# Patient Record
Sex: FEMALE | Race: OTHER | HISPANIC OR LATINO | URBAN - METROPOLITAN AREA
[De-identification: names, ages, dates, MRNs, and addresses within clinical notes are randomized per-mention and may not be internally consistent; named-entity substitution may affect disease eponyms.]

---

## 2020-02-14 ENCOUNTER — EMERGENCY (EMERGENCY)
Facility: HOSPITAL | Age: 85
LOS: 1 days | Discharge: HOME | End: 2020-02-14
Attending: EMERGENCY MEDICINE
Payer: MEDICARE

## 2020-02-14 VITALS
DIASTOLIC BLOOD PRESSURE: 78 MMHG | OXYGEN SATURATION: 97 % | WEIGHT: 130.07 LBS | TEMPERATURE: 98 F | HEIGHT: 59 IN | SYSTOLIC BLOOD PRESSURE: 138 MMHG | RESPIRATION RATE: 19 BRPM | HEART RATE: 74 BPM

## 2020-02-14 LAB
ALBUMIN SERPL ELPH-MCNC: 3.5 G/DL — SIGNIFICANT CHANGE UP (ref 3.5–5)
ALP SERPL-CCNC: 62 U/L — SIGNIFICANT CHANGE UP (ref 40–120)
ALT FLD-CCNC: 21 U/L DA — SIGNIFICANT CHANGE UP (ref 10–60)
ANION GAP SERPL CALC-SCNC: 7 MMOL/L — SIGNIFICANT CHANGE UP (ref 5–17)
APTT BLD: 34 SEC — SIGNIFICANT CHANGE UP (ref 27.5–36.3)
AST SERPL-CCNC: 17 U/L — SIGNIFICANT CHANGE UP (ref 10–40)
BILIRUB SERPL-MCNC: 0.3 MG/DL — SIGNIFICANT CHANGE UP (ref 0.2–1.2)
BUN SERPL-MCNC: 24 MG/DL — HIGH (ref 7–18)
CALCIUM SERPL-MCNC: 9.3 MG/DL — SIGNIFICANT CHANGE UP (ref 8.4–10.5)
CHLORIDE SERPL-SCNC: 104 MMOL/L — SIGNIFICANT CHANGE UP (ref 96–108)
CO2 SERPL-SCNC: 27 MMOL/L — SIGNIFICANT CHANGE UP (ref 22–31)
CREAT SERPL-MCNC: 0.9 MG/DL — SIGNIFICANT CHANGE UP (ref 0.5–1.3)
FLU A RESULT: SIGNIFICANT CHANGE UP
FLU A RESULT: SIGNIFICANT CHANGE UP
FLUAV AG NPH QL: SIGNIFICANT CHANGE UP
FLUBV AG NPH QL: SIGNIFICANT CHANGE UP
GLUCOSE SERPL-MCNC: 101 MG/DL — HIGH (ref 70–99)
HCT VFR BLD CALC: 34.7 % — SIGNIFICANT CHANGE UP (ref 34.5–45)
HGB BLD-MCNC: 11.2 G/DL — LOW (ref 11.5–15.5)
INR BLD: 1.02 RATIO — SIGNIFICANT CHANGE UP (ref 0.88–1.16)
LACTATE SERPL-SCNC: 1.8 MMOL/L — SIGNIFICANT CHANGE UP (ref 0.7–2)
MCHC RBC-ENTMCNC: 27.5 PG — SIGNIFICANT CHANGE UP (ref 27–34)
MCHC RBC-ENTMCNC: 32.3 GM/DL — SIGNIFICANT CHANGE UP (ref 32–36)
MCV RBC AUTO: 85.3 FL — SIGNIFICANT CHANGE UP (ref 80–100)
NRBC # BLD: 0 /100 WBCS — SIGNIFICANT CHANGE UP (ref 0–0)
NT-PROBNP SERPL-SCNC: 160 PG/ML — SIGNIFICANT CHANGE UP (ref 0–450)
PLATELET # BLD AUTO: 136 K/UL — LOW (ref 150–400)
POTASSIUM SERPL-MCNC: 3.7 MMOL/L — SIGNIFICANT CHANGE UP (ref 3.5–5.3)
POTASSIUM SERPL-SCNC: 3.7 MMOL/L — SIGNIFICANT CHANGE UP (ref 3.5–5.3)
PROT SERPL-MCNC: 6.7 G/DL — SIGNIFICANT CHANGE UP (ref 6–8.3)
PROTHROM AB SERPL-ACNC: 11.3 SEC — SIGNIFICANT CHANGE UP (ref 10–12.9)
RBC # BLD: 4.07 M/UL — SIGNIFICANT CHANGE UP (ref 3.8–5.2)
RBC # FLD: 13.7 % — SIGNIFICANT CHANGE UP (ref 10.3–14.5)
RSV RESULT: SIGNIFICANT CHANGE UP
RSV RNA RESP QL NAA+PROBE: SIGNIFICANT CHANGE UP
SODIUM SERPL-SCNC: 138 MMOL/L — SIGNIFICANT CHANGE UP (ref 135–145)
TROPONIN I SERPL-MCNC: <0.015 NG/ML — SIGNIFICANT CHANGE UP (ref 0–0.04)
WBC # BLD: 4.83 K/UL — SIGNIFICANT CHANGE UP (ref 3.8–10.5)
WBC # FLD AUTO: 4.83 K/UL — SIGNIFICANT CHANGE UP (ref 3.8–10.5)

## 2020-02-14 PROCEDURE — 36415 COLL VENOUS BLD VENIPUNCTURE: CPT

## 2020-02-14 PROCEDURE — 93005 ELECTROCARDIOGRAM TRACING: CPT

## 2020-02-14 PROCEDURE — 86901 BLOOD TYPING SEROLOGIC RH(D): CPT

## 2020-02-14 PROCEDURE — 84484 ASSAY OF TROPONIN QUANT: CPT

## 2020-02-14 PROCEDURE — 80053 COMPREHEN METABOLIC PANEL: CPT

## 2020-02-14 PROCEDURE — 93010 ELECTROCARDIOGRAM REPORT: CPT

## 2020-02-14 PROCEDURE — 71045 X-RAY EXAM CHEST 1 VIEW: CPT | Mod: 26

## 2020-02-14 PROCEDURE — 83605 ASSAY OF LACTIC ACID: CPT

## 2020-02-14 PROCEDURE — 87631 RESP VIRUS 3-5 TARGETS: CPT

## 2020-02-14 PROCEDURE — 71045 X-RAY EXAM CHEST 1 VIEW: CPT

## 2020-02-14 PROCEDURE — 82962 GLUCOSE BLOOD TEST: CPT

## 2020-02-14 PROCEDURE — 85027 COMPLETE CBC AUTOMATED: CPT

## 2020-02-14 PROCEDURE — 85730 THROMBOPLASTIN TIME PARTIAL: CPT

## 2020-02-14 PROCEDURE — 85610 PROTHROMBIN TIME: CPT

## 2020-02-14 PROCEDURE — 83880 ASSAY OF NATRIURETIC PEPTIDE: CPT

## 2020-02-14 PROCEDURE — 86900 BLOOD TYPING SEROLOGIC ABO: CPT

## 2020-02-14 PROCEDURE — 99285 EMERGENCY DEPT VISIT HI MDM: CPT

## 2020-02-14 PROCEDURE — 86850 RBC ANTIBODY SCREEN: CPT

## 2020-02-14 PROCEDURE — 99284 EMERGENCY DEPT VISIT MOD MDM: CPT | Mod: 25

## 2020-02-14 RX ORDER — SODIUM CHLORIDE 9 MG/ML
1000 INJECTION INTRAMUSCULAR; INTRAVENOUS; SUBCUTANEOUS ONCE
Refills: 0 | Status: COMPLETED | OUTPATIENT
Start: 2020-02-14 | End: 2020-02-14

## 2020-02-14 RX ORDER — AZITHROMYCIN 500 MG/1
1 TABLET, FILM COATED ORAL
Qty: 6 | Refills: 0
Start: 2020-02-14 | End: 2020-02-18

## 2020-02-14 RX ORDER — CEFUROXIME AXETIL 250 MG
1 TABLET ORAL
Qty: 14 | Refills: 0
Start: 2020-02-14 | End: 2020-02-20

## 2020-02-14 RX ADMIN — SODIUM CHLORIDE 1000 MILLILITER(S): 9 INJECTION INTRAMUSCULAR; INTRAVENOUS; SUBCUTANEOUS at 05:40

## 2020-02-14 NOTE — ED PROVIDER NOTE - NSFOLLOWUPINSTRUCTIONS_ED_ALL_ED_FT
Community-Acquired Pneumonia, Adult  Pneumonia is an infection of the lungs. It causes swelling in the airways of the lungs. Mucus and fluid may also build up inside the airways.  One type of pneumonia can happen while a person is in a hospital. A different type can happen when a person is not in a hospital (community-acquired pneumonia).   What are the causes?     This condition is caused by germs (viruses, bacteria, or fungi). Some types of germs can be passed from one person to another. This can happen when you breathe in droplets from the cough or sneeze of an infected person.  What increases the risk?  You are more likely to develop this condition if you:  Have a long-term (chronic) disease, such as:  Chronic obstructive pulmonary disease (COPD).Asthma.Cystic fibrosis.Congestive heart failure.Diabetes.Kidney disease.Have HIV.Have sickle cell disease.Have had your spleen removed.Do not take good care of your teeth and mouth (poor dental hygiene).Have a medical condition that increases the risk of breathing in droplets from your own mouth and nose.Have a weakened body defense system (immune system).Are a smoker.Travel to areas where the germs that cause this illness are common.Are around certain animals or the places they live.What are the signs or symptoms?  A dry cough. A wet (productive) cough.Fever. Sweating. Chest pain. This often happens when breathing deeply or coughing.Fast breathing or trouble breathing.Shortness of breath. Shaking chills. Feeling tired (fatigue).Muscle aches.How is this treated?  Treatment for this condition depends on many things. Most adults can be treated at home. In some cases, treatment must happen in a hospital. Treatment may include:  Medicines given by mouth or through an IV tube.Being given extra oxygen.Respiratory therapy.In rare cases, treatment for very bad pneumonia may include:  Using a machine to help you breathe.Having a procedure to remove fluid from around your lungs.Follow these instructions at home:  Medicines     Take over-the-counter and prescription medicines only as told by your doctor.  Only take cough medicine if you are losing sleep.If you were prescribed an antibiotic medicine, take it as told by your doctor. Do not stop taking the antibiotic even if you start to feel better.General instructions        Sleep with your head and neck raised (elevated). You can do this by sleeping in a recliner or by putting a few pillows under your head.Rest as needed. Get at least 8 hours of sleep each night.Drink enough water to keep your pee (urine) pale yellow.Eat a healthy diet that includes plenty of vegetables, fruits, whole grains, low-fat dairy products, and lean protein.Do not use any products that contain nicotine or tobacco. These include cigarettes, e-cigarettes, and chewing tobacco. If you need help quitting, ask your doctor.Keep all follow-up visits as told by your doctor. This is important.How is this prevented?  A shot (vaccine) can help prevent pneumonia. Shots are often suggested for:  People older than 65 years of age.People older than 19 years of age who:  Are having cancer treatment.Have long-term (chronic) lung disease.Have problems with their body's defense system.You may also prevent pneumonia if you take these actions:  Get the flu (influenza) shot every year.Go to the dentist as often as told.Wash your hands often. If you cannot use soap and water, use hand .Contact a doctor if:  You have a fever.You lose sleep because your cough medicine does not help.Get help right away if:  You are short of breath and it gets worse.You have more chest pain.Your sickness gets worse. This is very serious if:  You are an older adult.Your body's defense system is weak.You cough up blood.Summary  Pneumonia is an infection of the lungs.Most adults can be treated at home. Some will need treatment in a hospital.Drink enough water to keep your pee pale yellow.Get at least 8 hours of sleep each night.This information is not intended to replace advice given to you by your health care provider. Make sure you discuss any questions you have with your health care provider.    Document Released: 06/05/2009 Document Revised: 08/15/2019 Document Reviewed: 08/15/2019  [employer] Interactive Patient Education © 2020 [employer]

## 2020-02-14 NOTE — ED PROVIDER NOTE - PATIENT PORTAL LINK FT
You can access the FollowMyHealth Patient Portal offered by [facility] by registering at the following website: [email or web address]. By joining [employer]’s FollowMyHealth portal, you will also be able to view your health information using other applications (apps) compatible with our system.

## 2020-02-14 NOTE — ED PROVIDER NOTE - NS ED ROS FT
CONSTITUTIONAL: +fever, no chills   EYES: no visual changes, no eye pain   ENMT: no nasal congestion, no throat pain  CARDIOVASCULAR: +chest pain, no edema, no palpitations   RESPIRATORY: no shortness of breath, +cough   GASTROINTESTINAL: no abdominal pain, no nausea, no vomiting, no diarrhea, no constipation   GENITOURINARY: no dysuria, no frequency  MUSCULOSKELETAL: no joint pains, +myalgias, no back pain   SKIN: no rashes  NEUROLOGICAL: no weakness, no headache, no dizziness, no slurred speech, no syncope   PSYCHIATRIC: no known mental health illness   HEME/LYMPH: no lymphadenopathy      All other ROS negative except as per HPI

## 2020-02-14 NOTE — ED PROVIDER NOTE - PHYSICAL EXAMINATION
GENERAL: well appearing, no acute distress, coughing during exam   HEAD: atraumatic   EYES: EOMI, pink conjunctiva   ENT: moist oral mucosa   CARDIAC: RRR, no edema, distal pulses present   RESPIRATORY: lungs CTAB, no increased work of breathing   GASTROINTESTINAL: no abdominal tenderness, no rebound or guarding, bowel sounds presents  GENITOURINARY: no CVA tenderness   MUSCULOSKELETAL: no deformity   NEUROLOGICAL: AAOx3, spontaneous movement of extremities   SKIN: intact   PSYCHIATRIC: cooperative  HEME LYMPH: no lymphadenopathy

## 2020-02-14 NOTE — ED PROVIDER NOTE - PROGRESS NOTE DETAILS
EKG - nsr, rate 74, 1st deg AV block  CXR - questionable opacity of L heart border   labs - trop negative, bnp negative, lactate 1.8  Will dc with rx for azithromycin and PCP fu. Discussed indications for patient return to ED. Patient understood.

## 2020-02-14 NOTE — ED PROVIDER NOTE - OBJECTIVE STATEMENT
86 yo F pmh of DM, HTN, HLD presents with cough x 4 days with fever and body aches and chest pain while coughing. Took tylenol pta. Denies HA, SOB, abd pain, n/v/d, other acute complaints. Declined translation services; opted for family to translate at bedside.

## 2020-02-14 NOTE — ED ADULT NURSE NOTE - OBJECTIVE STATEMENT
Pt was BIB EMS reporting cough, body aches, congestion, fever X 4days. Pt took Tylenol prior to arrival. HX HTN, DM, HLD. denied allergies to medication. Pt denied chest pain, SOB, dizziness, N/V/D. All safety precautions in place.

## 2021-07-06 ENCOUNTER — INPATIENT (INPATIENT)
Facility: HOSPITAL | Age: 86
LOS: 0 days | Discharge: HOME | DRG: 554 | End: 2021-07-07
Attending: INTERNAL MEDICINE | Admitting: INTERNAL MEDICINE
Payer: MEDICARE

## 2021-07-06 VITALS
SYSTOLIC BLOOD PRESSURE: 171 MMHG | HEART RATE: 64 BPM | TEMPERATURE: 98 F | RESPIRATION RATE: 18 BRPM | OXYGEN SATURATION: 98 % | HEIGHT: 59 IN | DIASTOLIC BLOOD PRESSURE: 72 MMHG | WEIGHT: 136.47 LBS

## 2021-07-06 DIAGNOSIS — M25.569 PAIN IN UNSPECIFIED KNEE: ICD-10-CM

## 2021-07-06 DIAGNOSIS — Z86.79 PERSONAL HISTORY OF OTHER DISEASES OF THE CIRCULATORY SYSTEM: ICD-10-CM

## 2021-07-06 DIAGNOSIS — I25.10 ATHEROSCLEROTIC HEART DISEASE OF NATIVE CORONARY ARTERY WITHOUT ANGINA PECTORIS: ICD-10-CM

## 2021-07-06 DIAGNOSIS — Z29.9 ENCOUNTER FOR PROPHYLACTIC MEASURES, UNSPECIFIED: ICD-10-CM

## 2021-07-06 DIAGNOSIS — R09.89 OTHER SPECIFIED SYMPTOMS AND SIGNS INVOLVING THE CIRCULATORY AND RESPIRATORY SYSTEMS: ICD-10-CM

## 2021-07-06 DIAGNOSIS — E11.9 TYPE 2 DIABETES MELLITUS WITHOUT COMPLICATIONS: ICD-10-CM

## 2021-07-06 DIAGNOSIS — E78.5 HYPERLIPIDEMIA, UNSPECIFIED: ICD-10-CM

## 2021-07-06 LAB
24R-OH-CALCIDIOL SERPL-MCNC: 25.1 NG/ML — LOW (ref 30–80)
A1C WITH ESTIMATED AVERAGE GLUCOSE RESULT: 7.3 % — HIGH (ref 4–5.6)
ALBUMIN SERPL ELPH-MCNC: 3.3 G/DL — LOW (ref 3.5–5)
ALBUMIN SERPL ELPH-MCNC: 3.5 G/DL — SIGNIFICANT CHANGE UP (ref 3.5–5)
ALP SERPL-CCNC: 83 U/L — SIGNIFICANT CHANGE UP (ref 40–120)
ALP SERPL-CCNC: 93 U/L — SIGNIFICANT CHANGE UP (ref 40–120)
ALT FLD-CCNC: 19 U/L DA — SIGNIFICANT CHANGE UP (ref 10–60)
ALT FLD-CCNC: 20 U/L DA — SIGNIFICANT CHANGE UP (ref 10–60)
ANION GAP SERPL CALC-SCNC: 3 MMOL/L — LOW (ref 5–17)
ANION GAP SERPL CALC-SCNC: 5 MMOL/L — SIGNIFICANT CHANGE UP (ref 5–17)
APTT BLD: 37.9 SEC — HIGH (ref 27.5–35.5)
AST SERPL-CCNC: 16 U/L — SIGNIFICANT CHANGE UP (ref 10–40)
AST SERPL-CCNC: 17 U/L — SIGNIFICANT CHANGE UP (ref 10–40)
BASOPHILS # BLD AUTO: 0.03 K/UL — SIGNIFICANT CHANGE UP (ref 0–0.2)
BASOPHILS NFR BLD AUTO: 0.5 % — SIGNIFICANT CHANGE UP (ref 0–2)
BILIRUB SERPL-MCNC: 0.2 MG/DL — SIGNIFICANT CHANGE UP (ref 0.2–1.2)
BILIRUB SERPL-MCNC: 0.2 MG/DL — SIGNIFICANT CHANGE UP (ref 0.2–1.2)
BUN SERPL-MCNC: 26 MG/DL — HIGH (ref 7–18)
BUN SERPL-MCNC: 28 MG/DL — HIGH (ref 7–18)
CALCIUM SERPL-MCNC: 9.5 MG/DL — SIGNIFICANT CHANGE UP (ref 8.4–10.5)
CALCIUM SERPL-MCNC: 9.8 MG/DL — SIGNIFICANT CHANGE UP (ref 8.4–10.5)
CHLORIDE SERPL-SCNC: 105 MMOL/L — SIGNIFICANT CHANGE UP (ref 96–108)
CHLORIDE SERPL-SCNC: 107 MMOL/L — SIGNIFICANT CHANGE UP (ref 96–108)
CO2 SERPL-SCNC: 28 MMOL/L — SIGNIFICANT CHANGE UP (ref 22–31)
CO2 SERPL-SCNC: 29 MMOL/L — SIGNIFICANT CHANGE UP (ref 22–31)
CREAT SERPL-MCNC: 0.81 MG/DL — SIGNIFICANT CHANGE UP (ref 0.5–1.3)
CREAT SERPL-MCNC: 0.94 MG/DL — SIGNIFICANT CHANGE UP (ref 0.5–1.3)
D DIMER BLD IA.RAPID-MCNC: <150 NG/ML DDU — SIGNIFICANT CHANGE UP
EOSINOPHIL # BLD AUTO: 0.21 K/UL — SIGNIFICANT CHANGE UP (ref 0–0.5)
EOSINOPHIL NFR BLD AUTO: 3.6 % — SIGNIFICANT CHANGE UP (ref 0–6)
ERYTHROCYTE [SEDIMENTATION RATE] IN BLOOD: 16 MM/HR — SIGNIFICANT CHANGE UP (ref 0–20)
ESTIMATED AVERAGE GLUCOSE: 163 MG/DL — HIGH (ref 68–114)
FERRITIN SERPL-MCNC: 16 NG/ML — SIGNIFICANT CHANGE UP (ref 15–150)
GLUCOSE BLDC GLUCOMTR-MCNC: 110 MG/DL — HIGH (ref 70–99)
GLUCOSE BLDC GLUCOMTR-MCNC: 193 MG/DL — HIGH (ref 70–99)
GLUCOSE BLDC GLUCOMTR-MCNC: 304 MG/DL — HIGH (ref 70–99)
GLUCOSE BLDC GLUCOMTR-MCNC: 99 MG/DL — SIGNIFICANT CHANGE UP (ref 70–99)
GLUCOSE SERPL-MCNC: 101 MG/DL — HIGH (ref 70–99)
GLUCOSE SERPL-MCNC: 144 MG/DL — HIGH (ref 70–99)
HCT VFR BLD CALC: 33.5 % — LOW (ref 34.5–45)
HCT VFR BLD CALC: 34.1 % — LOW (ref 34.5–45)
HGB BLD-MCNC: 10.9 G/DL — LOW (ref 11.5–15.5)
HGB BLD-MCNC: 10.9 G/DL — LOW (ref 11.5–15.5)
IMM GRANULOCYTES NFR BLD AUTO: 0.2 % — SIGNIFICANT CHANGE UP (ref 0–1.5)
INR BLD: 1.08 RATIO — SIGNIFICANT CHANGE UP (ref 0.88–1.16)
IRON SATN MFR SERPL: 28 UG/DL — LOW (ref 40–150)
IRON SATN MFR SERPL: 7 % — LOW (ref 15–50)
LYMPHOCYTES # BLD AUTO: 1.98 K/UL — SIGNIFICANT CHANGE UP (ref 1–3.3)
LYMPHOCYTES # BLD AUTO: 33.7 % — SIGNIFICANT CHANGE UP (ref 13–44)
MCHC RBC-ENTMCNC: 26.3 PG — LOW (ref 27–34)
MCHC RBC-ENTMCNC: 26.7 PG — LOW (ref 27–34)
MCHC RBC-ENTMCNC: 32 GM/DL — SIGNIFICANT CHANGE UP (ref 32–36)
MCHC RBC-ENTMCNC: 32.5 GM/DL — SIGNIFICANT CHANGE UP (ref 32–36)
MCV RBC AUTO: 81.9 FL — SIGNIFICANT CHANGE UP (ref 80–100)
MCV RBC AUTO: 82.4 FL — SIGNIFICANT CHANGE UP (ref 80–100)
MONOCYTES # BLD AUTO: 0.48 K/UL — SIGNIFICANT CHANGE UP (ref 0–0.9)
MONOCYTES NFR BLD AUTO: 8.2 % — SIGNIFICANT CHANGE UP (ref 2–14)
NEUTROPHILS # BLD AUTO: 3.16 K/UL — SIGNIFICANT CHANGE UP (ref 1.8–7.4)
NEUTROPHILS NFR BLD AUTO: 53.8 % — SIGNIFICANT CHANGE UP (ref 43–77)
NRBC # BLD: 0 /100 WBCS — SIGNIFICANT CHANGE UP (ref 0–0)
NRBC # BLD: 0 /100 WBCS — SIGNIFICANT CHANGE UP (ref 0–0)
PLATELET # BLD AUTO: 137 K/UL — LOW (ref 150–400)
PLATELET # BLD AUTO: 144 K/UL — LOW (ref 150–400)
POTASSIUM SERPL-MCNC: 3.7 MMOL/L — SIGNIFICANT CHANGE UP (ref 3.5–5.3)
POTASSIUM SERPL-MCNC: 3.8 MMOL/L — SIGNIFICANT CHANGE UP (ref 3.5–5.3)
POTASSIUM SERPL-SCNC: 3.7 MMOL/L — SIGNIFICANT CHANGE UP (ref 3.5–5.3)
POTASSIUM SERPL-SCNC: 3.8 MMOL/L — SIGNIFICANT CHANGE UP (ref 3.5–5.3)
PROT SERPL-MCNC: 7.1 G/DL — SIGNIFICANT CHANGE UP (ref 6–8.3)
PROT SERPL-MCNC: 7.6 G/DL — SIGNIFICANT CHANGE UP (ref 6–8.3)
PROTHROM AB SERPL-ACNC: 12.8 SEC — SIGNIFICANT CHANGE UP (ref 10.6–13.6)
RBC # BLD: 4.09 M/UL — SIGNIFICANT CHANGE UP (ref 3.8–5.2)
RBC # BLD: 4.14 M/UL — SIGNIFICANT CHANGE UP (ref 3.8–5.2)
RBC # FLD: 14.6 % — HIGH (ref 10.3–14.5)
RBC # FLD: 14.6 % — HIGH (ref 10.3–14.5)
SARS-COV-2 RNA SPEC QL NAA+PROBE: SIGNIFICANT CHANGE UP
SODIUM SERPL-SCNC: 137 MMOL/L — SIGNIFICANT CHANGE UP (ref 135–145)
SODIUM SERPL-SCNC: 140 MMOL/L — SIGNIFICANT CHANGE UP (ref 135–145)
TIBC SERPL-MCNC: 382 UG/DL — SIGNIFICANT CHANGE UP (ref 250–450)
UIBC SERPL-MCNC: 354 UG/DL — SIGNIFICANT CHANGE UP (ref 110–370)
WBC # BLD: 5.67 K/UL — SIGNIFICANT CHANGE UP (ref 3.8–10.5)
WBC # BLD: 5.87 K/UL — SIGNIFICANT CHANGE UP (ref 3.8–10.5)
WBC # FLD AUTO: 5.67 K/UL — SIGNIFICANT CHANGE UP (ref 3.8–10.5)
WBC # FLD AUTO: 5.87 K/UL — SIGNIFICANT CHANGE UP (ref 3.8–10.5)

## 2021-07-06 PROCEDURE — 71045 X-RAY EXAM CHEST 1 VIEW: CPT | Mod: 26

## 2021-07-06 PROCEDURE — 99285 EMERGENCY DEPT VISIT HI MDM: CPT

## 2021-07-06 PROCEDURE — 93970 EXTREMITY STUDY: CPT | Mod: 26

## 2021-07-06 PROCEDURE — 99221 1ST HOSP IP/OBS SF/LOW 40: CPT

## 2021-07-06 PROCEDURE — 73562 X-RAY EXAM OF KNEE 3: CPT | Mod: 26,RT

## 2021-07-06 RX ORDER — ACETAMINOPHEN 500 MG
0 TABLET ORAL
Qty: 0 | Refills: 0 | DISCHARGE

## 2021-07-06 RX ORDER — METOPROLOL TARTRATE 50 MG
25 TABLET ORAL
Refills: 0 | Status: DISCONTINUED | OUTPATIENT
Start: 2021-07-06 | End: 2021-07-07

## 2021-07-06 RX ORDER — TRAMADOL HYDROCHLORIDE 50 MG/1
25 TABLET ORAL EVERY 8 HOURS
Refills: 0 | Status: DISCONTINUED | OUTPATIENT
Start: 2021-07-06 | End: 2021-07-07

## 2021-07-06 RX ORDER — OXYCODONE HYDROCHLORIDE 5 MG/1
2.5 TABLET ORAL EVERY 6 HOURS
Refills: 0 | Status: DISCONTINUED | OUTPATIENT
Start: 2021-07-06 | End: 2021-07-07

## 2021-07-06 RX ORDER — RIVAROXABAN 15 MG-20MG
2.5 KIT ORAL
Refills: 0 | Status: DISCONTINUED | OUTPATIENT
Start: 2021-07-06 | End: 2021-07-07

## 2021-07-06 RX ORDER — EZETIMIBE 10 MG/1
1 TABLET ORAL
Qty: 0 | Refills: 0 | DISCHARGE

## 2021-07-06 RX ORDER — ASPIRIN/CALCIUM CARB/MAGNESIUM 324 MG
81 TABLET ORAL DAILY
Refills: 0 | Status: DISCONTINUED | OUTPATIENT
Start: 2021-07-06 | End: 2021-07-07

## 2021-07-06 RX ORDER — AMLODIPINE BESYLATE 2.5 MG/1
0 TABLET ORAL
Qty: 0 | Refills: 0 | DISCHARGE

## 2021-07-06 RX ORDER — METOPROLOL TARTRATE 50 MG
1 TABLET ORAL
Qty: 0 | Refills: 0 | DISCHARGE

## 2021-07-06 RX ORDER — LIDOCAINE 4 G/100G
1 CREAM TOPICAL DAILY
Refills: 0 | Status: DISCONTINUED | OUTPATIENT
Start: 2021-07-06 | End: 2021-07-07

## 2021-07-06 RX ORDER — AMLODIPINE BESYLATE 2.5 MG/1
10 TABLET ORAL DAILY
Refills: 0 | Status: DISCONTINUED | OUTPATIENT
Start: 2021-07-06 | End: 2021-07-07

## 2021-07-06 RX ORDER — PANTOPRAZOLE SODIUM 20 MG/1
40 TABLET, DELAYED RELEASE ORAL
Refills: 0 | Status: DISCONTINUED | OUTPATIENT
Start: 2021-07-06 | End: 2021-07-07

## 2021-07-06 RX ORDER — ACETAMINOPHEN 500 MG
650 TABLET ORAL ONCE
Refills: 0 | Status: COMPLETED | OUTPATIENT
Start: 2021-07-06 | End: 2021-07-06

## 2021-07-06 RX ORDER — ATORVASTATIN CALCIUM 80 MG/1
0 TABLET, FILM COATED ORAL
Qty: 0 | Refills: 0 | DISCHARGE

## 2021-07-06 RX ORDER — FERROUS SULFATE 325(65) MG
325 TABLET ORAL ONCE
Refills: 0 | Status: COMPLETED | OUTPATIENT
Start: 2021-07-06 | End: 2021-07-06

## 2021-07-06 RX ORDER — ACETAMINOPHEN 500 MG
650 TABLET ORAL EVERY 6 HOURS
Refills: 0 | Status: DISCONTINUED | OUTPATIENT
Start: 2021-07-06 | End: 2021-07-06

## 2021-07-06 RX ORDER — INSULIN LISPRO 100/ML
VIAL (ML) SUBCUTANEOUS AT BEDTIME
Refills: 0 | Status: DISCONTINUED | OUTPATIENT
Start: 2021-07-06 | End: 2021-07-07

## 2021-07-06 RX ORDER — SODIUM CHLORIDE 9 MG/ML
3 INJECTION INTRAMUSCULAR; INTRAVENOUS; SUBCUTANEOUS ONCE
Refills: 0 | Status: COMPLETED | OUTPATIENT
Start: 2021-07-06 | End: 2021-07-06

## 2021-07-06 RX ORDER — ACETAMINOPHEN 500 MG
975 TABLET ORAL EVERY 8 HOURS
Refills: 0 | Status: DISCONTINUED | OUTPATIENT
Start: 2021-07-06 | End: 2021-07-07

## 2021-07-06 RX ORDER — INSULIN LISPRO 100/ML
VIAL (ML) SUBCUTANEOUS
Refills: 0 | Status: DISCONTINUED | OUTPATIENT
Start: 2021-07-06 | End: 2021-07-07

## 2021-07-06 RX ORDER — ATORVASTATIN CALCIUM 80 MG/1
40 TABLET, FILM COATED ORAL AT BEDTIME
Refills: 0 | Status: DISCONTINUED | OUTPATIENT
Start: 2021-07-06 | End: 2021-07-07

## 2021-07-06 RX ORDER — ESOMEPRAZOLE MAGNESIUM 40 MG/1
1 CAPSULE, DELAYED RELEASE ORAL
Qty: 0 | Refills: 0 | DISCHARGE

## 2021-07-06 RX ORDER — FERROUS SULFATE 325(65) MG
325 TABLET ORAL
Qty: 0 | Refills: 0 | DISCHARGE

## 2021-07-06 RX ADMIN — Medication 81 MILLIGRAM(S): at 12:11

## 2021-07-06 RX ADMIN — LIDOCAINE 1 PATCH: 4 CREAM TOPICAL at 17:47

## 2021-07-06 RX ADMIN — AMLODIPINE BESYLATE 10 MILLIGRAM(S): 2.5 TABLET ORAL at 05:50

## 2021-07-06 RX ADMIN — Medication 4: at 12:10

## 2021-07-06 RX ADMIN — Medication 650 MILLIGRAM(S): at 02:46

## 2021-07-06 RX ADMIN — RIVAROXABAN 2.5 MILLIGRAM(S): KIT at 17:41

## 2021-07-06 RX ADMIN — Medication 325 MILLIGRAM(S): at 05:50

## 2021-07-06 RX ADMIN — LIDOCAINE 1 PATCH: 4 CREAM TOPICAL at 21:04

## 2021-07-06 RX ADMIN — RIVAROXABAN 2.5 MILLIGRAM(S): KIT at 06:02

## 2021-07-06 RX ADMIN — Medication 650 MILLIGRAM(S): at 03:16

## 2021-07-06 RX ADMIN — PANTOPRAZOLE SODIUM 40 MILLIGRAM(S): 20 TABLET, DELAYED RELEASE ORAL at 08:37

## 2021-07-06 RX ADMIN — Medication 25 MILLIGRAM(S): at 05:50

## 2021-07-06 RX ADMIN — Medication 650 MILLIGRAM(S): at 12:41

## 2021-07-06 RX ADMIN — SODIUM CHLORIDE 3 MILLILITER(S): 9 INJECTION INTRAMUSCULAR; INTRAVENOUS; SUBCUTANEOUS at 03:07

## 2021-07-06 RX ADMIN — Medication 975 MILLIGRAM(S): at 18:16

## 2021-07-06 RX ADMIN — Medication 975 MILLIGRAM(S): at 17:46

## 2021-07-06 RX ADMIN — Medication 650 MILLIGRAM(S): at 12:11

## 2021-07-06 RX ADMIN — Medication 25 MILLIGRAM(S): at 17:41

## 2021-07-06 NOTE — H&P ADULT - MUSCULOSKELETAL COMMENTS
Right Knee has decreased ROM, with joint swelling with joint warmth. No calf tenderness noted. 5/5 strenght b/l LE

## 2021-07-06 NOTE — H&P ADULT - NSICDXPASTMEDICALHX_GEN_ALL_CORE_FT
PAST MEDICAL HISTORY:  CAD (coronary artery disease)     Diabetes     History of hypertension     Hyperlipidemia

## 2021-07-06 NOTE — H&P ADULT - PROBLEM SELECTOR PLAN 6
IMPROVE VTE score: 3  Will manage with:     [ ] Previous VTE                                    3  [ ] Thrombophilia                                  2  [ x] Lower limb paralysis                        2  (unable to hold up >15 seconds)    [ ] Current Cancer (within 6 months)        2   [x] Immobilization > 24 hrs                    1  [ ] ICU/CCU stay > 24 hrs                      1  [x] Age > 60                                         1 IMPROVE VTE score:   Will manage with:     [ ] Previous VTE                                    3  [ ] Thrombophilia                                  2  [ x] Lower limb paralysis                        2  (unable to hold up >15 seconds)    [ ] Current Cancer (within 6 months)        2   [x] Immobilization > 24 hrs                    1  [ ] ICU/CCU stay > 24 hrs                      1  [x] Age > 60                                         1

## 2021-07-06 NOTE — H&P ADULT - PROBLEM SELECTOR PLAN 1
R/O DVT   - D- Dimer ordered  - US Duplex Venous Lower Extremity R/O DVT, reported DVT by ER attending but suspected PAD given patien is on xarelto 2.5mg BID   - D- Dimer ordered  - US Duplex Venous Lower Extremity  - Continue Xarelto 2.5mg BID, unclear if patient is on Aspirin  - Hold anticoagulation

## 2021-07-06 NOTE — CONSULT NOTE ADULT - SUBJECTIVE AND OBJECTIVE BOX
Pt Name: CAMILA ALLEN  MRN: [id number]    ORTHOPAEDIC SURGERY CONSULT    Diagnosis:   - Rt knee osteoarthritis mild-mod    86yFemaleHPI:  Patient is a Nigerien speaking 89yo female with hx of prior DVT, HTN, HLD, DM, CAD presents with a chief compliant of right knee pain. Patient is accompanied by her granddaughter who provides translation. Right knee pain started on 7/5/21 at 4AM. Pain is a stabbing pain, constant, non radiating with no alleviating or aggravating factors. Pain is described as a 10/10 in severity. Patient says before pain she felt a tingling sensation in her right toes and on the sole of her right foot. Patient denies any prior episodes or trauma. Patient denies any recent travel, period of immobility or recent surgery. Patient denies any chest pain, sob, cough. Denies any fevers, chills. Denies any numbness, or loss of sensation. Patient denies any swelling in the lower extremities.    (06 Jul 2021 03:37)    Pt is a 85yo f, with no recent trauma, who presents with a 2 day h/o mod-severe right knee pain, where she has had difficulty ambulating. She reports having this right knee before, where she has been treating herself with OTC medications for her right knee pain and lately her right knee pain has not responded to anything and she reports right knee swelling with inability to fully bend the right knee. Pt denies Chest pain, SOB, dyspnea, paresthesias, N/V/D, abdominal pain, syncope, or pain anywhere else.     AMBULATION: Baseline Ambulation  [     ] independent   [  XXX   ] With Cane   [     ] With Walker   [     ]  Bedbound   [     ] Pivot transfers to Wheelchair only      PAST MEDICAL & SURGICAL HISTORY:  Diabetes    History of hypertension    Hyperlipidemia    CAD (coronary artery disease)        ALLERGIES: No Known Allergies      MEDICATIONS: acetaminophen   Tablet .. 975 milliGRAM(s) Oral every 8 hours  amLODIPine   Tablet 10 milliGRAM(s) Oral daily  aspirin enteric coated 81 milliGRAM(s) Oral daily  atorvastatin 40 milliGRAM(s) Oral at bedtime  insulin lispro (ADMELOG) corrective regimen sliding scale   SubCutaneous three times a day before meals  insulin lispro (ADMELOG) corrective regimen sliding scale   SubCutaneous at bedtime  metoprolol tartrate 25 milliGRAM(s) Oral two times a day  oxyCODONE    IR 2.5 milliGRAM(s) Oral every 6 hours PRN  pantoprazole    Tablet 40 milliGRAM(s) Oral before breakfast  rivaroxaban 2.5 milliGRAM(s) Oral two times a day  traMADol 25 milliGRAM(s) Oral every 8 hours PRN      PHYSICAL EXAM:    Vital Signs Last 24 Hrs  T(C): 36.5 (06 Jul 2021 16:25), Max: 36.8 (06 Jul 2021 11:20)  T(F): 97.7 (06 Jul 2021 16:25), Max: 98.2 (06 Jul 2021 11:20)  HR: 64 (06 Jul 2021 16:25) (60 - 66)  BP: 119/68 (06 Jul 2021 16:25) (115/64 - 171/72)  BP(mean): --  RR: 17 (06 Jul 2021 16:25) (16 - 18)  SpO2: 96% (06 Jul 2021 16:25) (95% - 98%)    Gen: well developed, well nourished, comfortable  MSK:  Right knee  Skin pink, warm. No open lesions.  no erythema  +joint line tenderness noted (med+lat)  dec ROM 2* to pain  flexion rt knee noted 0-90deg AROM  no ct  calves soft  DP/PT 2+, brisk b/l  nvi silt  5/5 strength ehl/ta/gastroc b/l.    LABS:                        10.9   5.67  )-----------( 137      ( 06 Jul 2021 05:45 )             34.1     07-06    140  |  107  |  26<H>  ----------------------------<  101<H>  3.7   |  28  |  0.81    Ca    9.5      06 Jul 2021 05:45    TPro  7.1  /  Alb  3.3<L>  /  TBili  0.2  /  DBili  x   /  AST  16  /  ALT  19  /  AlkPhos  83  07-06    PT/INR - ( 06 Jul 2021 01:38 )   PT: 12.8 sec;   INR: 1.08 ratio         PTT - ( 06 Jul 2021 01:38 )  PTT:37.9 sec    RADIOLOGY:   < from: Xray Knee 3 Views, Right (07.06.21 @ 01:42) >    EXAM:  XR KNEE AP LAT OBL 3 VIEWS RT                            PROCEDURE DATE:  07/06/2021          INTERPRETATION:  Right knee. Patient has local swelling. 3 views. True lateral view not obtained so effusion assessment difficult. Arterial calcifications noted.    There is soft tissue swelling medial to the knee.    There is mild to moderate degeneration mainly centrally. No bone destruction or fracture.    IMPRESSION: Soft tissue swelling medially. Mild to moderate degeneration. No fracture.    --- End of Report ---            DAHIANA PARKER MD; Attending Radiologist  This document has been electronically signed. Jul 6 2021 11:05AM    < end of copied text >        A/P:   86y Female with:   - Mild to moderate degenerative joint disease of the right knee/osteoarthritis  #  -  Recommendation: [ XXX ] Conservative treatment   [  ] Surgical treatment/fixation     > ATC Tylenol 975mg po q8hr ordered x 48 hours     > oxycodone 2.5mg po q6hr prn pain     > tramadol 25mg po q8hr prn severe pain  -  No indication for needle aspiration at this time. No suspicion for infection. No suspicion for septic joint. no white count. ESR lvl WNL.       No surgical intervention at this time. Projecting a good prognosis with PT and pain control for rt knee pain  -  Pain control  -  DVT prophylaxis  -  Daily PT- WBAT of the right knee  -  Case d/w Dr. Knowles  -  Pt is orthopedically stable for discharge.   -  Follow-Up with Dr. Knowles in TWO WEEKS at [phone]  Pt Name: CAMILA ALLEN  MRN: [id number]    ORTHOPAEDIC SURGERY CONSULT    Diagnosis:   - Rt knee osteoarthritis mild-mod    86yFemaleHPI:  Patient is a Ghanaian speaking 88yo female with hx of prior DVT, HTN, HLD, DM, CAD presents with a chief compliant of right knee pain. Patient is accompanied by her granddaughter who provides translation. Right knee pain started on 7/5/21 at 4AM. Pain is a stabbing pain, constant, non radiating with no alleviating or aggravating factors. Pain is described as a 10/10 in severity. Patient says before pain she felt a tingling sensation in her right toes and on the sole of her right foot. Patient denies any prior episodes or trauma. Patient denies any recent travel, period of immobility or recent surgery. Patient denies any chest pain, sob, cough. Denies any fevers, chills. Denies any numbness, or loss of sensation. Patient denies any swelling in the lower extremities.    (06 Jul 2021 03:37)    Pt is a 87yo f, with no recent trauma, who presents with a 2 day h/o mod-severe right knee pain, where she has had difficulty ambulating. She reports having this right knee before, where she has been treating herself with OTC medications for her right knee pain and lately her right knee pain has not responded to anything and she reports right knee swelling with inability to fully bend the right knee. Pt denies Chest pain, SOB, dyspnea, paresthesias, N/V/D, abdominal pain, syncope, or pain anywhere else.     AMBULATION: Baseline Ambulation  [     ] independent   [  XXX   ] With Cane   [     ] With Walker   [     ]  Bedbound   [     ] Pivot transfers to Wheelchair only      PAST MEDICAL & SURGICAL HISTORY:  Diabetes    History of hypertension    Hyperlipidemia    CAD (coronary artery disease)        ALLERGIES: No Known Allergies      MEDICATIONS: acetaminophen   Tablet .. 975 milliGRAM(s) Oral every 8 hours  amLODIPine   Tablet 10 milliGRAM(s) Oral daily  aspirin enteric coated 81 milliGRAM(s) Oral daily  atorvastatin 40 milliGRAM(s) Oral at bedtime  insulin lispro (ADMELOG) corrective regimen sliding scale   SubCutaneous three times a day before meals  insulin lispro (ADMELOG) corrective regimen sliding scale   SubCutaneous at bedtime  metoprolol tartrate 25 milliGRAM(s) Oral two times a day  oxyCODONE    IR 2.5 milliGRAM(s) Oral every 6 hours PRN  pantoprazole    Tablet 40 milliGRAM(s) Oral before breakfast  rivaroxaban 2.5 milliGRAM(s) Oral two times a day  traMADol 25 milliGRAM(s) Oral every 8 hours PRN      PHYSICAL EXAM:    Vital Signs Last 24 Hrs  T(C): 36.5 (06 Jul 2021 16:25), Max: 36.8 (06 Jul 2021 11:20)  T(F): 97.7 (06 Jul 2021 16:25), Max: 98.2 (06 Jul 2021 11:20)  HR: 64 (06 Jul 2021 16:25) (60 - 66)  BP: 119/68 (06 Jul 2021 16:25) (115/64 - 171/72)  BP(mean): --  RR: 17 (06 Jul 2021 16:25) (16 - 18)  SpO2: 96% (06 Jul 2021 16:25) (95% - 98%)    Gen: well developed, well nourished, comfortable  MSK:  Right knee  Skin pink, warm. No open lesions.  no erythema  +joint line tenderness noted (med+lat)  dec ROM 2* to pain  flexion rt knee noted 0-90deg AROM  no ct  calves soft  DP/PT 2+, brisk b/l  nvi silt  5/5 strength ehl/ta/gastroc b/l.    LABS:                        10.9   5.67  )-----------( 137      ( 06 Jul 2021 05:45 )             34.1     07-06    140  |  107  |  26<H>  ----------------------------<  101<H>  3.7   |  28  |  0.81    Ca    9.5      06 Jul 2021 05:45    TPro  7.1  /  Alb  3.3<L>  /  TBili  0.2  /  DBili  x   /  AST  16  /  ALT  19  /  AlkPhos  83  07-06    PT/INR - ( 06 Jul 2021 01:38 )   PT: 12.8 sec;   INR: 1.08 ratio         PTT - ( 06 Jul 2021 01:38 )  PTT:37.9 sec    RADIOLOGY:   < from: Xray Knee 3 Views, Right (07.06.21 @ 01:42) >    EXAM:  XR KNEE AP LAT OBL 3 VIEWS RT                            PROCEDURE DATE:  07/06/2021          INTERPRETATION:  Right knee. Patient has local swelling. 3 views. True lateral view not obtained so effusion assessment difficult. Arterial calcifications noted.    There is soft tissue swelling medial to the knee.    There is mild to moderate degeneration mainly centrally. No bone destruction or fracture.    IMPRESSION: Soft tissue swelling medially. Mild to moderate degeneration. No fracture.    --- End of Report ---            DAHIANA PARKER MD; Attending Radiologist  This document has been electronically signed. Jul 6 2021 11:05AM    < end of copied text >        A/P:   86y Female with:   - Mild to moderate degenerative joint disease of the right knee/osteoarthritis  #  -  Recommendation: [ XXX ] Conservative treatment   [  ] Surgical treatment/fixation     > ATC Tylenol 975mg po q8hr ordered x 48 hours     > oxycodone 2.5mg po q6hr prn pain     > tramadol 25mg po q8hr prn severe pain     > Lidoderm patch rt knee  -  No indication for needle aspiration at this time. No suspicion for infection. No suspicion for septic joint. no white count. ESR lvl WNL.       No surgical intervention at this time. Projecting a good prognosis with PT and pain control for rt knee pain  -  Pain control  -  DVT prophylaxis  -  Daily PT- WBAT of the right knee  -  Case d/w Dr. Knowles  -  Pt is orthopedically stable for discharge.   -  Follow-Up with Dr. Knowles in TWO WEEKS at [phone]

## 2021-07-06 NOTE — H&P ADULT - PROBLEM SELECTOR PLAN 4
- BP Currently 171/72 currently asymptomatic  - Monitor BP   - Continue with Amlodipine 10mg PO QD  - DASH Diet

## 2021-07-06 NOTE — H&P ADULT - PROBLEM SELECTOR PLAN 5
- Lipid Panel ordered  - Continue Atorvastatin 40mg PO QHS  - Ezetimibe 10mg QD PO - Lipid Panel ordered  - Continue Atorvastatin 40mg PO QHS

## 2021-07-06 NOTE — H&P ADULT - PROBLEM SELECTOR PLAN 3
- Glucose noted to be elevated at 144  - HbA1C ordered  - POCT Glucose testing q 4 hours  - Continue with glipizide 5mg PO QD - Glucose noted to be elevated at 144  - HbA1C ordered  - POCT Glucose testing q 4 hours  - Sliding Scale ordered - Glucose noted to be elevated at 144  - Hold home medication Glypizide   - HbA1C ordered  - POCT Glucose testing q 4 hours  - Sliding Scale ordered, titrate as needed

## 2021-07-06 NOTE — H&P ADULT - NSICDXFAMILYHX_GEN_ALL_CORE_FT
FAMILY HISTORY:  FH: hyperlipidemia  FH: hypertension  FH: type 1 diabetes    Child  Still living? Unknown  FH: lung cancer, Age at diagnosis: Age Unknown

## 2021-07-06 NOTE — H&P ADULT - ASSESSMENT
Patient is a Sinhala speaking 88yo female with hx of prior DVT, HTN, HLD, DM, CAD presents with a chief compliant of right knee pain. Right knee pain started on 7/5/21 at 4AM. Pain is a stabbing pain, constant, non radiating with no alleviating or aggravating factors. Pain is described as a 10/10 in severity. Patient denies any recent travel, immobilization or surgery and trauma. Denies cp, sob, cough or swelling in the lower extremities. Physical exam significant for decreased ROM of R. knee, edematous, warm to touch and non erythematous. No calf tenderness noted. Labs WNL, X-ray negative for fracture. Patient is admitted to r/o DVT.  Patient is a Mauritian speaking 88yo female with hx of prior DVT, HTN, HLD, DM, CAD presents with a chief compliant of right knee pain. Right knee pain started on 7/5/21 at 4AM. Pain is a stabbing pain, constant, non radiating with no alleviating or aggravating factors. Pain is described as a 10/10 in severity. Patient denies any recent travel, immobilization or surgery and trauma. Denies cp, sob, cough or swelling in the lower extremities. Physical exam significant for decreased ROM of R. knee, edematous, warm to touch and non erythematous. No calf tenderness noted. Labs WNL, X-ray negative for fracture. Patient is admitted to r/o DVT.       < from: US Duplex Venous Lower Ext Complete, Bilateral (07.06.21 @ 10:04) >  IMPRESSION:  No evidence of deep venous thrombosis in either lower extremity.    --- End of Report ---    < end of copied text >      < from: Xray Knee 3 Views, Right (07.06.21 @ 01:42) >  IMPRESSION: Soft tissue swelling medially. Mild to moderate degeneration. No fracture.    --- End of Report ---    < end of copied text >    < from: Xray Chest 1 View AP/PA (07.06.21 @ 01:41) >    IMPRESSION: Interstitial prominence.    < end of copied text >        ASSESSMENT     Knee Pain - Right   DVT   CAD   DM   HTN   Abnormal CXR     PLAN     US negative for DVT   On A/C - Xarelto   XR showed soft tissue swelling / mild/mod degeneration   Continue home meds   Lipid panel   Pulm consult   Accuchecks   Regular insulin coverage per HSS   Check A1C level   Monitor BP   DVT and GI PPX      Patient is a Namibian speaking 88yo female with hx of prior DVT, HTN, HLD, DM, CAD presents with a chief compliant of right knee pain. Right knee pain started on 7/5/21 at 4AM. Pain is a stabbing pain, constant, non radiating with no alleviating or aggravating factors. Pain is described as a 10/10 in severity. Patient denies any recent travel, immobilization or surgery and trauma. Denies cp, sob, cough or swelling in the lower extremities. Physical exam significant for decreased ROM of R. knee, edematous, warm to touch and non erythematous. No calf tenderness noted. Labs WNL, X-ray negative for fracture. Patient is admitted to r/o DVT.       < from: US Duplex Venous Lower Ext Complete, Bilateral (07.06.21 @ 10:04) >  IMPRESSION:  No evidence of deep venous thrombosis in either lower extremity.    --- End of Report ---    < end of copied text >      < from: Xray Knee 3 Views, Right (07.06.21 @ 01:42) >  IMPRESSION: Soft tissue swelling medially. Mild to moderate degeneration. No fracture.    --- End of Report ---    < end of copied text >    < from: Xray Chest 1 View AP/PA (07.06.21 @ 01:41) >    IMPRESSION: Interstitial prominence.    < end of copied text >        ASSESSMENT     Knee Pain - Right   DVT   CAD   DM   HTN   Abnormal CXR     PLAN     US negative for DVT   On A/C - Xarelto   XR showed soft tissue swelling / mild/mod degeneration   Ortho eval - may need CT/MRI   Continue home meds   Lipid panel   Pulm consult   Accuchecks   Regular insulin coverage per HSS   Check A1C level   Monitor BP   DVT and GI PPX      Patient is a St Helenian speaking 87yo female with hx of prior DVT, HTN, HLD, DM, CAD presents with a chief compliant of right knee pain. Right knee pain started on 7/5/21 at 4AM. Pain is a stabbing pain, constant, non radiating with no alleviating or aggravating factors. Pain is described as a 10/10 in severity. Patient denies any recent travel, immobilization or surgery and trauma. Denies cp, sob, cough or swelling in the lower extremities. Physical exam significant for decreased ROM of R. knee, edematous, warm to touch and non erythematous. No calf tenderness noted. Labs WNL, X-ray negative for fracture. Patient is admitted to r/o DVT.       < from: US Duplex Venous Lower Ext Complete, Bilateral (07.06.21 @ 10:04) >  IMPRESSION:  No evidence of deep venous thrombosis in either lower extremity.    --- End of Report ---    < end of copied text >      < from: Xray Knee 3 Views, Right (07.06.21 @ 01:42) >  IMPRESSION: Soft tissue swelling medially. Mild to moderate degeneration. No fracture.    --- End of Report ---    < end of copied text >    < from: Xray Chest 1 View AP/PA (07.06.21 @ 01:41) >    IMPRESSION: Interstitial prominence.    < end of copied text >        ASSESSMENT     Knee Pain - Right   DVT   CAD   DM   HTN   Abnormal CXR     PLAN     US negative for DVT   On A/C - Xarelto   XR showed soft tissue swelling / mild/mod degeneration   Ortho eval - may need CT/MRI   Continue home meds   Lipid panel   Pulm consult   Accuchecks   Regular insulin coverage per HSS   Check A1C level   Monitor BP   DVT and GI PPX

## 2021-07-06 NOTE — ED PROVIDER NOTE - CLINICAL SUMMARY MEDICAL DECISION MAKING FREE TEXT BOX
Pt with hx of DVT, has popliteal area tenderness, will admit for duplex, pain management and possible PT.  MAR endorsed. Pt agrees with admission. I had a detailed discussion with the patient and/or guardian regarding the historical points, exam findings, and any diagnostic results supporting the admit diagnosis.

## 2021-07-06 NOTE — ED PROVIDER NOTE - OBJECTIVE STATEMENT
Chief complaint of left knee pain progressively worsening since last night, no chest pain, no shortness of breath. Deneis trauma to area.  Daughter concerned pt is limping and  has unsteady gait due to knee pain.  Pt is on Xarelto for hx of DVT.

## 2021-07-06 NOTE — CHART NOTE - NSCHARTNOTEFT_GEN_A_CORE
Patient is a Mohawk speaking 88yo female with hx of prior DVT, HTN, HLD, DM, CAD presents with a chief compliant of right knee pain and swelling for 2 days. No falls or trauma   X-ray right knee: Soft tissue swelling medially. Mild to moderate degeneration. No fracture.    Patient is admitted for ambulatory disfucntion due to right knee pain to r/o DVT.     US Duplex Venous Lower Ext Complete, Bilateral: No evidence of deep venous thrombosis in either lower extremity.  Pt was seen by ortho: pain likely due to mild to moderate degenerative joint disease of the right knee/osteoarthritis. Outpatient ortho follow up recommended       OBJECTIVE:  Vital Signs Last 24 Hrs  T(C): 36.5 (06 Jul 2021 16:25), Max: 36.8 (06 Jul 2021 11:20)  T(F): 97.7 (06 Jul 2021 16:25), Max: 98.2 (06 Jul 2021 11:20)  HR: 64 (06 Jul 2021 16:25) (60 - 66)  BP: 119/68 (06 Jul 2021 16:25) (115/64 - 171/72)  BP(mean): --  RR: 17 (06 Jul 2021 16:25) (16 - 18)  SpO2: 96% (06 Jul 2021 16:25) (95% - 98%)    FOCUSED PHYSICAL EXAM:  Neuro: awake, alert, oriented x 3. No neuro deficit  Cardiovascular: Pulses +2 B/L in lower and upper extremities, HR regular, BP stable, No edema.  Respiratory: Respirations regular, unlabored, breath sounds clear B/L.   MSK right knee swelling, mildly tender to touch   GI: Abdomen soft, non-tender, positive bowel sounds.  : no bladder distention noted. No complaints at this time.  Skin: Dry, intact, no bruising, no diaphoresis.        PLAN:     - c/w pain  management   - Continue home meds   - Pt eval  - outpatietn ortho follow up after discharge   - DVT and GI PPX

## 2021-07-06 NOTE — H&P ADULT - PROBLEM SELECTOR PLAN 2
- Continue with Xarelto 2.5mg PO BID   - Metoprolol Succinate 50mg QD PO   - F/U with EKG   - Lipid profile

## 2021-07-06 NOTE — H&P ADULT - HISTORY OF PRESENT ILLNESS
Patient is a Danish speaking 89yo female with hx of prior DVT, HTN, HLD, DM, CAD presents with a chief compliant of right knee pain. Patient is accompanied by her granddaughter who provides translation. Right knee pain started on 7/5/21 at 4AM. Pain is a stabbing pain, constant, non radiating with no alleviating or aggravating factors. Pain is described as a 10/10 in severity. Patient says before pain she felt a tingling sensation in her right toes and on the sole of her right foot. Patient denies any prior episodes or trauma. Patient denies any recent travel, period of immobility or recent surgery. Patient denies any chest pain, sob, cough. Denies any fevers, chills. Denies any numbness, or loss of sensation. Patient denies any swelling in the lower extremities.    Patient is a Azeri speaking 85yo female with hx of prior DVT, HTN, HLD, DM, CAD presents with a chief compliant of right knee pain. Patient is accompanied by her granddaughter who provides translation. Right knee pain started on 7/5/21 at 4AM. Pain is a stabbing pain, constant, non radiating with no alleviating or aggravating factors. Pain is described as a 10/10 in severity. Patient says before pain she felt a tingling sensation in her right toes and on the sole of her right foot. Patient denies any prior episodes or trauma. Patient denies any recent travel, period of immobility or recent surgery. Patient denies any chest pain, sob, cough. Denies any fevers, chills. Denies any numbness, or loss of sensation. Patient denies any swelling in the lower extremities.

## 2021-07-06 NOTE — ED PROVIDER NOTE - PHYSICAL EXAMINATION
Right knee and popliteal fossa area tenderness, no erythema, no fluid focus. pedal pulses intact, cap refill < 2 secs.

## 2021-07-07 ENCOUNTER — TRANSCRIPTION ENCOUNTER (OUTPATIENT)
Age: 86
End: 2021-07-07

## 2021-07-07 VITALS — OXYGEN SATURATION: 96 % | SYSTOLIC BLOOD PRESSURE: 122 MMHG | HEART RATE: 60 BPM | DIASTOLIC BLOOD PRESSURE: 73 MMHG

## 2021-07-07 DIAGNOSIS — R93.89 ABNORMAL FINDINGS ON DIAGNOSTIC IMAGING OF OTHER SPECIFIED BODY STRUCTURES: ICD-10-CM

## 2021-07-07 LAB
ANION GAP SERPL CALC-SCNC: 8 MMOL/L — SIGNIFICANT CHANGE UP (ref 5–17)
BUN SERPL-MCNC: 23 MG/DL — HIGH (ref 7–18)
CALCIUM SERPL-MCNC: 9.8 MG/DL — SIGNIFICANT CHANGE UP (ref 8.4–10.5)
CHLORIDE SERPL-SCNC: 106 MMOL/L — SIGNIFICANT CHANGE UP (ref 96–108)
CHOLEST SERPL-MCNC: 157 MG/DL — SIGNIFICANT CHANGE UP
CO2 SERPL-SCNC: 26 MMOL/L — SIGNIFICANT CHANGE UP (ref 22–31)
COVID-19 SPIKE DOMAIN AB INTERP: POSITIVE
COVID-19 SPIKE DOMAIN ANTIBODY RESULT: 139 U/ML — HIGH
CREAT SERPL-MCNC: 0.87 MG/DL — SIGNIFICANT CHANGE UP (ref 0.5–1.3)
GLUCOSE BLDC GLUCOMTR-MCNC: 130 MG/DL — HIGH (ref 70–99)
GLUCOSE BLDC GLUCOMTR-MCNC: 150 MG/DL — HIGH (ref 70–99)
GLUCOSE BLDC GLUCOMTR-MCNC: 254 MG/DL — HIGH (ref 70–99)
GLUCOSE SERPL-MCNC: 139 MG/DL — HIGH (ref 70–99)
HCT VFR BLD CALC: 36.8 % — SIGNIFICANT CHANGE UP (ref 34.5–45)
HDLC SERPL-MCNC: 53 MG/DL — SIGNIFICANT CHANGE UP
HGB BLD-MCNC: 11.7 G/DL — SIGNIFICANT CHANGE UP (ref 11.5–15.5)
LIPID PNL WITH DIRECT LDL SERPL: 91 MG/DL — SIGNIFICANT CHANGE UP
MCHC RBC-ENTMCNC: 26.5 PG — LOW (ref 27–34)
MCHC RBC-ENTMCNC: 31.8 GM/DL — LOW (ref 32–36)
MCV RBC AUTO: 83.3 FL — SIGNIFICANT CHANGE UP (ref 80–100)
NON HDL CHOLESTEROL: 104 MG/DL — SIGNIFICANT CHANGE UP
NRBC # BLD: 0 /100 WBCS — SIGNIFICANT CHANGE UP (ref 0–0)
PLATELET # BLD AUTO: 130 K/UL — LOW (ref 150–400)
POTASSIUM SERPL-MCNC: 4.4 MMOL/L — SIGNIFICANT CHANGE UP (ref 3.5–5.3)
POTASSIUM SERPL-SCNC: 4.4 MMOL/L — SIGNIFICANT CHANGE UP (ref 3.5–5.3)
RBC # BLD: 4.42 M/UL — SIGNIFICANT CHANGE UP (ref 3.8–5.2)
RBC # FLD: 15.1 % — HIGH (ref 10.3–14.5)
SARS-COV-2 IGG+IGM SERPL QL IA: 139 U/ML — HIGH
SARS-COV-2 IGG+IGM SERPL QL IA: POSITIVE
SODIUM SERPL-SCNC: 140 MMOL/L — SIGNIFICANT CHANGE UP (ref 135–145)
TRIGL SERPL-MCNC: 63 MG/DL — SIGNIFICANT CHANGE UP
WBC # BLD: 5.23 K/UL — SIGNIFICANT CHANGE UP (ref 3.8–10.5)
WBC # FLD AUTO: 5.23 K/UL — SIGNIFICANT CHANGE UP (ref 3.8–10.5)

## 2021-07-07 PROCEDURE — 71045 X-RAY EXAM CHEST 1 VIEW: CPT

## 2021-07-07 PROCEDURE — 99285 EMERGENCY DEPT VISIT HI MDM: CPT | Mod: 25

## 2021-07-07 PROCEDURE — 86769 SARS-COV-2 COVID-19 ANTIBODY: CPT

## 2021-07-07 PROCEDURE — 93005 ELECTROCARDIOGRAM TRACING: CPT

## 2021-07-07 PROCEDURE — 80048 BASIC METABOLIC PNL TOTAL CA: CPT

## 2021-07-07 PROCEDURE — 85610 PROTHROMBIN TIME: CPT

## 2021-07-07 PROCEDURE — 82728 ASSAY OF FERRITIN: CPT

## 2021-07-07 PROCEDURE — 97161 PT EVAL LOW COMPLEX 20 MIN: CPT

## 2021-07-07 PROCEDURE — 85025 COMPLETE CBC W/AUTO DIFF WBC: CPT

## 2021-07-07 PROCEDURE — 85652 RBC SED RATE AUTOMATED: CPT

## 2021-07-07 PROCEDURE — 83550 IRON BINDING TEST: CPT

## 2021-07-07 PROCEDURE — 73562 X-RAY EXAM OF KNEE 3: CPT

## 2021-07-07 PROCEDURE — 87635 SARS-COV-2 COVID-19 AMP PRB: CPT

## 2021-07-07 PROCEDURE — 36415 COLL VENOUS BLD VENIPUNCTURE: CPT

## 2021-07-07 PROCEDURE — 85379 FIBRIN DEGRADATION QUANT: CPT

## 2021-07-07 PROCEDURE — 93970 EXTREMITY STUDY: CPT

## 2021-07-07 PROCEDURE — 82962 GLUCOSE BLOOD TEST: CPT

## 2021-07-07 PROCEDURE — 80061 LIPID PANEL: CPT

## 2021-07-07 PROCEDURE — 83036 HEMOGLOBIN GLYCOSYLATED A1C: CPT

## 2021-07-07 PROCEDURE — 82306 VITAMIN D 25 HYDROXY: CPT

## 2021-07-07 PROCEDURE — 80053 COMPREHEN METABOLIC PANEL: CPT

## 2021-07-07 PROCEDURE — 85027 COMPLETE CBC AUTOMATED: CPT

## 2021-07-07 PROCEDURE — 83540 ASSAY OF IRON: CPT

## 2021-07-07 PROCEDURE — 85730 THROMBOPLASTIN TIME PARTIAL: CPT

## 2021-07-07 RX ORDER — ASPIRIN/CALCIUM CARB/MAGNESIUM 324 MG
1 TABLET ORAL
Qty: 30 | Refills: 0
Start: 2021-07-07 | End: 2021-08-05

## 2021-07-07 RX ORDER — LIDOCAINE 4 G/100G
1 CREAM TOPICAL
Qty: 30 | Refills: 0
Start: 2021-07-07 | End: 2021-08-05

## 2021-07-07 RX ORDER — TRAMADOL HYDROCHLORIDE 50 MG/1
0.5 TABLET ORAL
Qty: 21 | Refills: 0
Start: 2021-07-07 | End: 2021-07-20

## 2021-07-07 RX ORDER — ACETAMINOPHEN 500 MG
2 TABLET ORAL
Qty: 42 | Refills: 0
Start: 2021-07-07 | End: 2021-07-13

## 2021-07-07 RX ADMIN — Medication 25 MILLIGRAM(S): at 05:30

## 2021-07-07 RX ADMIN — ATORVASTATIN CALCIUM 40 MILLIGRAM(S): 80 TABLET, FILM COATED ORAL at 02:12

## 2021-07-07 RX ADMIN — Medication 975 MILLIGRAM(S): at 11:42

## 2021-07-07 RX ADMIN — AMLODIPINE BESYLATE 10 MILLIGRAM(S): 2.5 TABLET ORAL at 05:30

## 2021-07-07 RX ADMIN — Medication 975 MILLIGRAM(S): at 12:42

## 2021-07-07 RX ADMIN — LIDOCAINE 1 PATCH: 4 CREAM TOPICAL at 11:43

## 2021-07-07 RX ADMIN — Medication 81 MILLIGRAM(S): at 11:42

## 2021-07-07 RX ADMIN — Medication 975 MILLIGRAM(S): at 03:40

## 2021-07-07 RX ADMIN — LIDOCAINE 1 PATCH: 4 CREAM TOPICAL at 05:08

## 2021-07-07 RX ADMIN — Medication 975 MILLIGRAM(S): at 02:12

## 2021-07-07 RX ADMIN — PANTOPRAZOLE SODIUM 40 MILLIGRAM(S): 20 TABLET, DELAYED RELEASE ORAL at 05:30

## 2021-07-07 RX ADMIN — Medication 3: at 11:53

## 2021-07-07 RX ADMIN — RIVAROXABAN 2.5 MILLIGRAM(S): KIT at 05:30

## 2021-07-07 NOTE — DISCHARGE NOTE PROVIDER - PROVIDER TOKENS
FREE:[LAST:[King],FIRST:[Tushar],PHONE:[(   )    -],FAX:[(   )    -],ADDRESS:[PMD in 2 weeks]],PROVIDER:[TOKEN:[[id number]],FOLLOWUP:[2 weeks]]

## 2021-07-07 NOTE — PHYSICAL THERAPY INITIAL EVALUATION ADULT - GENERAL OBSERVATIONS, REHAB EVAL
Patient was seen for PT evaluation today. EMR, laboratory and radiology results reviewed. Pt received supine in bed, NAD, Salonpas on right knee and family member in room.

## 2021-07-07 NOTE — PHYSICAL THERAPY INITIAL EVALUATION ADULT - ACTIVE RANGE OF MOTION EXAMINATION, REHAB EVAL
RLE are WNL except right knee flexion: 0- 105degree with pain at end range./ward. upper extremity Active ROM was WNL (within normal limits)/LLE Active ROM was WNL (within normal limits)

## 2021-07-07 NOTE — PROGRESS NOTE ADULT - ASSESSMENT
ASSESSMENT     Knee Pain - Right   DVT   CAD   DM   HTN   Abnormal CXR     PLAN     US negative for DVT   On A/C - Xarelto   XR showed soft tissue swelling / mild/mod degeneration   Ortho eval noted.  F/U with ortho as OP in 1-2 weeks.   No indication for needle aspiration at this time. No suspicion for infection. No suspicion for septic joint. no white count. ESR lvl WNL.   No surgical intervention at this time. Projecting a good prognosis with PT and pain control for rt knee pain  Pain control regiment.   PT eval   Continue home meds   Lipid panel   Pulm F/U   CT chest without contrast   Accuchecks   Regular insulin coverage per HSS   Check A1C level   Monitor BP   DVT and GI PPX

## 2021-07-07 NOTE — DISCHARGE NOTE NURSING/CASE MANAGEMENT/SOCIAL WORK - PATIENT PORTAL LINK FT
You can access the FollowMyHealth Patient Portal offered by [facility] by registering at the following website: [email or web address]. By joining [employer]’s FollowMyHealth portal, you will also be able to view your health information using other applications (apps) compatible with our system.

## 2021-07-07 NOTE — DISCHARGE NOTE PROVIDER - CARE PROVIDER_API CALL
Tushar King  PMD in 2 weeks  Phone: (   )    -  Fax: (   )    -  Follow Up Time:     Cristhian Knowles)  Orthopaedic Surgery  [address] [place] [address]  Phone: [phone]  Fax: [phone]  Follow Up Time: 2 weeks

## 2021-07-07 NOTE — PHYSICAL THERAPY INITIAL EVALUATION ADULT - PERTINENT HX OF CURRENT PROBLEM, REHAB EVAL
Patient is a Maori speaking 88yo female with PMhx of prior DVT, HTN, HLD, DM, CAD. Admitted for right knee pain.

## 2021-07-07 NOTE — CONSULT NOTE ADULT - SUBJECTIVE AND OBJECTIVE BOX
PULMONARY CONSULT NOTE      CAMILA ALLEN  MRN-[id number]    History of Present Illness:  Reason for Admission: Right Knee Pain  History of Present Illness:   Patient is a Mongolian speaking 88yo female with hx of prior DVT, HTN, HLD, DM, CAD presents with a chief compliant of right knee pain. Patient is accompanied by her granddaughter who provides translation. Right knee pain started on 7/5/21 at 4AM. Pain is a stabbing pain, constant, non radiating with no alleviating or aggravating factors. Pain is described as a 10/10 in severity. Patient says before pain she felt a tingling sensation in her right toes and on the sole of her right foot. Patient denies any prior episodes or trauma. Patient denies any recent travel, period of immobility or recent surgery. Patient denies any chest pain, sob, cough. Denies any fevers, chills. Denies any numbness, or loss of sensation. Patient denies any swelling in the lower extremities.         HISTORY OF PRESENT ILLNESS: As above. Awake, alert, comfortable in bed in NAD    MEDICATIONS  (STANDING):  acetaminophen   Tablet .. 975 milliGRAM(s) Oral every 8 hours  amLODIPine   Tablet 10 milliGRAM(s) Oral daily  aspirin enteric coated 81 milliGRAM(s) Oral daily  atorvastatin 40 milliGRAM(s) Oral at bedtime  insulin lispro (ADMELOG) corrective regimen sliding scale   SubCutaneous three times a day before meals  insulin lispro (ADMELOG) corrective regimen sliding scale   SubCutaneous at bedtime  lidocaine   Patch 1 Patch Transdermal daily  metoprolol tartrate 25 milliGRAM(s) Oral two times a day  pantoprazole    Tablet 40 milliGRAM(s) Oral before breakfast  rivaroxaban 2.5 milliGRAM(s) Oral two times a day      MEDICATIONS  (PRN):  oxyCODONE    IR 2.5 milliGRAM(s) Oral every 6 hours PRN Moderate Pain (4 - 6)  traMADol 25 milliGRAM(s) Oral every 8 hours PRN Severe Pain (7 - 10)      Allergies    No Known Allergies    Intolerances        PAST MEDICAL & SURGICAL HISTORY:  Diabetes    History of hypertension    Hyperlipidemia    CAD (coronary artery disease)        FAMILY HISTORY:  FH: type 1 diabetes    FH: hypertension    FH: hyperlipidemia    FH: lung cancer (Child)        SOCIAL HISTORY  Smoking History:     REVIEW OF SYSTEMS:    CONSTITUTIONAL:  No fevers, chills, sweats    HEENT:  Eyes:  No diplopia or blurred vision. ENT:  No earache, sore throat or runny nose.    CARDIOVASCULAR:  No pressure, squeezing, tightness, or heaviness about the chest; no palpitations.    RESPIRATORY:  Per HPI    GASTROINTESTINAL:  No abdominal pain, nausea, vomiting or diarrhea.    GENITOURINARY:  No dysuria, frequency or urgency.    NEUROLOGIC:  No paresthesias, fasciculations, seizures or weakness.    PSYCHIATRIC:  No disorder of thought or mood.    Vital Signs Last 24 Hrs  T(C): 36.7 (07 Jul 2021 05:05), Max: 36.8 (06 Jul 2021 11:20)  T(F): 98.1 (07 Jul 2021 05:05), Max: 98.2 (06 Jul 2021 11:20)  HR: 60 (07 Jul 2021 05:05) (58 - 66)  BP: 144/74 (07 Jul 2021 05:05) (115/64 - 144/74)  BP(mean): --  RR: 16 (07 Jul 2021 05:05) (16 - 18)  SpO2: 97% (07 Jul 2021 05:05) (95% - 98%)  I&O's Detail      PHYSICAL EXAMINATION:    GENERAL: The patient is a well-developed, well-nourished _____in no apparent distress.     HEENT: Head is normocephalic and atraumatic. Extraocular muscles are intact. Mucous membranes are moist.     NECK: Supple.     LUNGS: Clear to auscultation without wheezing, rales, or rhonchi. Respirations unlabored    HEART: Regular rate and rhythm without murmur.    ABDOMEN: Soft, nontender, and nondistended.  No hepatosplenomegaly is noted.    EXTREMITIES: Without any cyanosis, clubbing, rash, lesions or edema.    NEUROLOGIC: Grossly intact.      LABS:                        11.7   5.23  )-----------( 130      ( 07 Jul 2021 06:48 )             36.8     07-07    140  |  106  |  23<H>  ----------------------------<  139<H>  4.4   |  26  |  0.87    Ca    9.8      07 Jul 2021 06:48    TPro  7.1  /  Alb  3.3<L>  /  TBili  0.2  /  DBili  x   /  AST  16  /  ALT  19  /  AlkPhos  83  07-06    PT/INR - ( 06 Jul 2021 01:38 )   PT: 12.8 sec;   INR: 1.08 ratio         PTT - ( 06 Jul 2021 01:38 )  PTT:37.9 sec                    MICROBIOLOGY:    RADIOLOGY & ADDITIONAL STUDIES:    CXR:  < from: Xray Chest 1 View AP/PA (07.06.21 @ 01:41) >  IMPRESSION: Interstitial prominence.    < end of copied text >    Ct scan chest;    ekg;    echo:

## 2021-07-07 NOTE — DISCHARGE NOTE PROVIDER - NSDCMRMEDTOKEN_GEN_ALL_CORE_FT
amLODIPine 10 mg oral tablet: 1 tab(s) orally once a day  atorvastatin 40 mg oral tablet: 1 tab(s) orally once a day  esomeprazole 40 mg oral delayed release capsule: 1 cap(s) orally once a day  ezetimibe 10 mg oral tablet: 1 tab(s) orally once a day  ferrous sulfate extended release: 325 milligram(s) orally  glipiZIDE 5 mg oral tablet: 1 tab(s) orally once a day  metoprolol succinate 50 mg oral capsule, extended release: 1 cap(s) orally once a day  Xarelto 2.5 mg oral tablet: 1 tab(s) orally 2 times a day   acetaminophen 325 mg oral tablet: 2 tab(s) orally every 8 hours  as needed   amLODIPine 10 mg oral tablet: 1 tab(s) orally once a day  aspirin 81 mg oral delayed release tablet: 1 tab(s) orally once a day  atorvastatin 40 mg oral tablet: 1 tab(s) orally once a day  esomeprazole 40 mg oral delayed release capsule: 1 cap(s) orally once a day  ezetimibe 10 mg oral tablet: 1 tab(s) orally once a day  ferrous sulfate extended release: 325 milligram(s) orally  glipiZIDE 5 mg oral tablet: 1 tab(s) orally once a day  lidocaine 5% topical film: Apply topically to affected area once a day   metoprolol succinate 50 mg oral capsule, extended release: 1 cap(s) orally once a day  out patient physical therapy :   traMADol 50 mg oral tablet: 0.5 tab(s) orally every 8 hours, As needed, Severe Pain (7 - 10) MDD:75mg  Xarelto 2.5 mg oral tablet: 1 tab(s) orally 2 times a day

## 2021-07-07 NOTE — PROGRESS NOTE ADULT - SUBJECTIVE AND OBJECTIVE BOX
Patient is a 86y old  Female who presents with a chief complaint of Right Knee Pain (07 Jul 2021 10:38)    pt seen in icu [  ], reg med floor [ x  ], bed [ x ], chair at bedside [   ], a+o x3 [ x ], lethargic [  ],  nad [  x]    harris [  ], ngt [  ], peg [  ], et tube [  ], cent line [  ], picc line [  ]        Allergies    No Known Allergies        Vitals    T(F): 98.1 (07-07-21 @ 05:05), Max: 98.1 (07-07-21 @ 05:05)  HR: 60 (07-07-21 @ 05:05) (58 - 64)  BP: 144/74 (07-07-21 @ 05:05) (119/68 - 144/74)  RR: 16 (07-07-21 @ 05:05) (16 - 18)  SpO2: 97% (07-07-21 @ 05:05) (95% - 98%)  Wt(kg): --  CAPILLARY BLOOD GLUCOSE      POCT Blood Glucose.: 254 mg/dL (07 Jul 2021 11:49)      Labs                          11.7   5.23  )-----------( 130      ( 07 Jul 2021 06:48 )             36.8       07-07    140  |  106  |  23<H>  ----------------------------<  139<H>  4.4   |  26  |  0.87    Ca    9.8      07 Jul 2021 06:48    TPro  7.1  /  Alb  3.3<L>  /  TBili  0.2  /  DBili  x   /  AST  16  /  ALT  19  /  AlkPhos  83  07-06                Radiology Results      Meds    MEDICATIONS  (STANDING):  acetaminophen   Tablet .. 975 milliGRAM(s) Oral every 8 hours  amLODIPine   Tablet 10 milliGRAM(s) Oral daily  aspirin enteric coated 81 milliGRAM(s) Oral daily  atorvastatin 40 milliGRAM(s) Oral at bedtime  insulin lispro (ADMELOG) corrective regimen sliding scale   SubCutaneous three times a day before meals  insulin lispro (ADMELOG) corrective regimen sliding scale   SubCutaneous at bedtime  lidocaine   Patch 1 Patch Transdermal daily  metoprolol tartrate 25 milliGRAM(s) Oral two times a day  pantoprazole    Tablet 40 milliGRAM(s) Oral before breakfast  rivaroxaban 2.5 milliGRAM(s) Oral two times a day      MEDICATIONS  (PRN):  oxyCODONE    IR 2.5 milliGRAM(s) Oral every 6 hours PRN Moderate Pain (4 - 6)  traMADol 25 milliGRAM(s) Oral every 8 hours PRN Severe Pain (7 - 10)      Physical Exam    Neuro :  no focal deficits  Respiratory: CTA B/L  CV: RRR, S1S2, no murmurs,   Abdominal: Soft, NT, ND +BS,  Extremities: + right knee / ankle edema, + peripheral pulses

## 2021-07-07 NOTE — PHYSICAL THERAPY INITIAL EVALUATION ADULT - ADDITIONAL COMMENTS
Patient lives in a private house with daughter. Main living quarters are in 2nd floor with 10 steps to access. Patient uses straight cane for home and community ambulation. Patient and daughter-in-law (at bedside) reports patient performs ADL's independently prior to admission.

## 2021-07-07 NOTE — DISCHARGE NOTE PROVIDER - HOSPITAL COURSE
87yo female with hx of prior DVT, HTN, HLD, DM, CAD presents with a chief compliant of  acute on chronic right knee pain. Rt knee xray with no fracture, Venous doppler obtained to rule out DVT. Seen by ortho for Mild to moderate DJD of the right knee/ OA. Pain and ROM improved with conservative tx. suggested daily PT with WBAT, PT recommends Home PT thus pt d/c home on home PT and f/u with Dr. Knowles in 2 weeks. 87yo female with hx of prior DVT, HTN, HLD, DM, CAD presents with a chief compliant of  acute on chronic right knee pain. Rt knee xray with no fracture, Venous doppler obtained to rule out DVT. Seen by ortho for Mild to moderate DJD of the right knee/ OA. Pain and ROM improved with conservative tx. suggested daily PT with WBAT, PT recommends outpatient PT,  thus pt d/c home on pain meds and will f/u with Dr. Knowles in 2 weeks.

## 2021-07-07 NOTE — PHYSICAL THERAPY INITIAL EVALUATION ADULT - FUNCTIONAL LIMITATIONS, PT EVAL
- Reviewed CT chest result.   - It showed subacute or chronic pulmonary emboli in the segmental pulmonary arteries of the left upper lobe.   - Patient is overall hemodynamically stable.   - Patient needs anticoagulation treatment for pulmonary embolism. She has high fall risk and need to follow strict fall precautions. I discussed with her son - Rakesh who is POAHC for patient. Son has opted for chronic anticoagulation treatment with Eliquis.   - Given chronicity of her pulmonary embolism, will NOT start 10 mg dose and start at 5 mg BID dose for Eliquis.   - will also get lower extremity venous doppler.     Gretel Agee MD  5/26/2018   self-care/home management/work/community/leisure

## 2022-07-06 NOTE — ED ADULT NURSE NOTE - HOW OFTEN DO YOU HAVE A DRINK CONTAINING ALCOHOL?
Never Cartilage Graft Text: The defect edges were debeveled with a #15 scalpel blade.  Given the location of the defect, shape of the defect, the fact the defect involved a full thickness cartilage defect a cartilage graft was deemed most appropriate.  An appropriate donor site was identified, cleansed, and anesthetized. The cartilage graft was then harvested and transferred to the recipient site, oriented appropriately and then sutured into place.  The secondary defect was then repaired using a primary closure.

## 2022-11-06 ENCOUNTER — EMERGENCY (EMERGENCY)
Facility: HOSPITAL | Age: 87
LOS: 1 days | Discharge: HOME | End: 2022-11-06
Attending: EMERGENCY MEDICINE
Payer: MEDICARE

## 2022-11-06 VITALS
HEART RATE: 85 BPM | RESPIRATION RATE: 18 BRPM | DIASTOLIC BLOOD PRESSURE: 75 MMHG | OXYGEN SATURATION: 98 % | SYSTOLIC BLOOD PRESSURE: 148 MMHG | TEMPERATURE: 99 F

## 2022-11-06 VITALS
DIASTOLIC BLOOD PRESSURE: 80 MMHG | TEMPERATURE: 98 F | WEIGHT: 134.04 LBS | HEART RATE: 93 BPM | OXYGEN SATURATION: 98 % | SYSTOLIC BLOOD PRESSURE: 163 MMHG | RESPIRATION RATE: 17 BRPM | HEIGHT: 58 IN

## 2022-11-06 LAB
ALBUMIN SERPL ELPH-MCNC: 3.3 G/DL — LOW (ref 3.5–5)
ALP SERPL-CCNC: 105 U/L — SIGNIFICANT CHANGE UP (ref 40–120)
ALT FLD-CCNC: 28 U/L DA — SIGNIFICANT CHANGE UP (ref 10–60)
ANION GAP SERPL CALC-SCNC: 9 MMOL/L — SIGNIFICANT CHANGE UP (ref 5–17)
AST SERPL-CCNC: 17 U/L — SIGNIFICANT CHANGE UP (ref 10–40)
BASOPHILS # BLD AUTO: 0.02 K/UL — SIGNIFICANT CHANGE UP (ref 0–0.2)
BASOPHILS NFR BLD AUTO: 0.3 % — SIGNIFICANT CHANGE UP (ref 0–2)
BILIRUB SERPL-MCNC: 0.2 MG/DL — SIGNIFICANT CHANGE UP (ref 0.2–1.2)
BUN SERPL-MCNC: 19 MG/DL — HIGH (ref 7–18)
CALCIUM SERPL-MCNC: 9.6 MG/DL — SIGNIFICANT CHANGE UP (ref 8.4–10.5)
CHLORIDE SERPL-SCNC: 95 MMOL/L — LOW (ref 96–108)
CO2 SERPL-SCNC: 27 MMOL/L — SIGNIFICANT CHANGE UP (ref 22–31)
CREAT SERPL-MCNC: 0.82 MG/DL — SIGNIFICANT CHANGE UP (ref 0.5–1.3)
EGFR: 69 ML/MIN/1.73M2 — SIGNIFICANT CHANGE UP
EOSINOPHIL # BLD AUTO: 0.08 K/UL — SIGNIFICANT CHANGE UP (ref 0–0.5)
EOSINOPHIL NFR BLD AUTO: 1 % — SIGNIFICANT CHANGE UP (ref 0–6)
FLUAV AG NPH QL: SIGNIFICANT CHANGE UP
FLUBV AG NPH QL: SIGNIFICANT CHANGE UP
GLUCOSE SERPL-MCNC: 175 MG/DL — HIGH (ref 70–99)
HCT VFR BLD CALC: 34.1 % — LOW (ref 34.5–45)
HGB BLD-MCNC: 11.2 G/DL — LOW (ref 11.5–15.5)
IMM GRANULOCYTES NFR BLD AUTO: 0.3 % — SIGNIFICANT CHANGE UP (ref 0–0.9)
LYMPHOCYTES # BLD AUTO: 0.59 K/UL — LOW (ref 1–3.3)
LYMPHOCYTES # BLD AUTO: 7.5 % — LOW (ref 13–44)
MCHC RBC-ENTMCNC: 27.3 PG — SIGNIFICANT CHANGE UP (ref 27–34)
MCHC RBC-ENTMCNC: 32.8 GM/DL — SIGNIFICANT CHANGE UP (ref 32–36)
MCV RBC AUTO: 83 FL — SIGNIFICANT CHANGE UP (ref 80–100)
MONOCYTES # BLD AUTO: 0.54 K/UL — SIGNIFICANT CHANGE UP (ref 0–0.9)
MONOCYTES NFR BLD AUTO: 6.8 % — SIGNIFICANT CHANGE UP (ref 2–14)
NEUTROPHILS # BLD AUTO: 6.64 K/UL — SIGNIFICANT CHANGE UP (ref 1.8–7.4)
NEUTROPHILS NFR BLD AUTO: 84.1 % — HIGH (ref 43–77)
NRBC # BLD: 0 /100 WBCS — SIGNIFICANT CHANGE UP (ref 0–0)
PLATELET # BLD AUTO: 169 K/UL — SIGNIFICANT CHANGE UP (ref 150–400)
POTASSIUM SERPL-MCNC: 4.2 MMOL/L — SIGNIFICANT CHANGE UP (ref 3.5–5.3)
POTASSIUM SERPL-SCNC: 4.2 MMOL/L — SIGNIFICANT CHANGE UP (ref 3.5–5.3)
PROT SERPL-MCNC: 7.6 G/DL — SIGNIFICANT CHANGE UP (ref 6–8.3)
RBC # BLD: 4.11 M/UL — SIGNIFICANT CHANGE UP (ref 3.8–5.2)
RBC # FLD: 14.1 % — SIGNIFICANT CHANGE UP (ref 10.3–14.5)
SARS-COV-2 RNA SPEC QL NAA+PROBE: SIGNIFICANT CHANGE UP
SODIUM SERPL-SCNC: 131 MMOL/L — LOW (ref 135–145)
TROPONIN I, HIGH SENSITIVITY RESULT: 4.9 NG/L — SIGNIFICANT CHANGE UP
WBC # BLD: 7.89 K/UL — SIGNIFICANT CHANGE UP (ref 3.8–10.5)
WBC # FLD AUTO: 7.89 K/UL — SIGNIFICANT CHANGE UP (ref 3.8–10.5)

## 2022-11-06 PROCEDURE — 85025 COMPLETE CBC W/AUTO DIFF WBC: CPT

## 2022-11-06 PROCEDURE — 93005 ELECTROCARDIOGRAM TRACING: CPT

## 2022-11-06 PROCEDURE — 84484 ASSAY OF TROPONIN QUANT: CPT

## 2022-11-06 PROCEDURE — 99285 EMERGENCY DEPT VISIT HI MDM: CPT

## 2022-11-06 PROCEDURE — 99285 EMERGENCY DEPT VISIT HI MDM: CPT | Mod: 25

## 2022-11-06 PROCEDURE — 87637 SARSCOV2&INF A&B&RSV AMP PRB: CPT

## 2022-11-06 PROCEDURE — 80053 COMPREHEN METABOLIC PANEL: CPT

## 2022-11-06 PROCEDURE — 36415 COLL VENOUS BLD VENIPUNCTURE: CPT

## 2022-11-06 PROCEDURE — 71045 X-RAY EXAM CHEST 1 VIEW: CPT

## 2022-11-06 PROCEDURE — 71045 X-RAY EXAM CHEST 1 VIEW: CPT | Mod: 26

## 2022-11-06 RX ORDER — ACETAMINOPHEN 500 MG
650 TABLET ORAL ONCE
Refills: 0 | Status: COMPLETED | OUTPATIENT
Start: 2022-11-06 | End: 2022-11-06

## 2022-11-06 RX ORDER — DEXAMETHASONE 0.5 MG/5ML
8 ELIXIR ORAL ONCE
Refills: 0 | Status: COMPLETED | OUTPATIENT
Start: 2022-11-06 | End: 2022-11-06

## 2022-11-06 RX ORDER — AZITHROMYCIN 500 MG/1
500 TABLET, FILM COATED ORAL ONCE
Refills: 0 | Status: COMPLETED | OUTPATIENT
Start: 2022-11-06 | End: 2022-11-06

## 2022-11-06 RX ORDER — AZITHROMYCIN 500 MG/1
1 TABLET, FILM COATED ORAL
Qty: 5 | Refills: 0
Start: 2022-11-06 | End: 2022-11-10

## 2022-11-06 RX ADMIN — Medication 8 MILLIGRAM(S): at 22:25

## 2022-11-06 RX ADMIN — AZITHROMYCIN 500 MILLIGRAM(S): 500 TABLET, FILM COATED ORAL at 22:25

## 2022-11-06 RX ADMIN — Medication 650 MILLIGRAM(S): at 18:44

## 2022-11-06 NOTE — ED PROVIDER NOTE - PATIENT PORTAL LINK FT
You can access the FollowMyHealth Patient Portal offered by [facility] by registering at the following website: [email or web address]. By joining [employer]’s FollowMyHealth portal, you will also be able to view your health information using other applications (apps) compatible with our system.

## 2022-11-06 NOTE — ED PROVIDER NOTE - OBJECTIVE STATEMENT
Patient has a history of chronic bronchitis, complains of cough shortness of breath denies irina chest pain, denies fever, nothing makes patient symptoms better or worse.  On review of systems also complains of right arm pain this has been going on for a few weeks she is in physical therapy for this issue

## 2022-11-06 NOTE — ED PROVIDER NOTE - CLINICAL SUMMARY MEDICAL DECISION MAKING FREE TEXT BOX
Patient has a history of reactive airway disease and presents with cough and shortness of breath that was not frankly wheezing, does have a productive cough, x-ray does not show an obvious pneumonia but the patient will be treated for radiograph negative for pneumonia, also be given a dose of steroids here, azithromycin to go home with, oxygen saturation is normal is not respiratory distress, no evidence of any cardiac etiology, cardiac work-up is negative, patient and family at the bedside, they agree with the plan for discharge and will follow-up as needed

## 2022-11-06 NOTE — ED PROVIDER NOTE - NSFOLLOWUPINSTRUCTIONS_ED_ALL_ED_FT
Please take your medications as directed and follow-up with your doctor in the next 3 days, if symptoms worsen come back to the ER thank you

## 2022-11-07 PROBLEM — E78.5 HYPERLIPIDEMIA, UNSPECIFIED: Chronic | Status: ACTIVE | Noted: 2021-07-06

## 2022-11-07 PROBLEM — I25.10 ATHEROSCLEROTIC HEART DISEASE OF NATIVE CORONARY ARTERY WITHOUT ANGINA PECTORIS: Chronic | Status: ACTIVE | Noted: 2021-07-06

## 2022-11-11 ENCOUNTER — INPATIENT (INPATIENT)
Facility: HOSPITAL | Age: 87
LOS: 5 days | Discharge: HOME | DRG: 309 | End: 2022-11-17
Attending: INTERNAL MEDICINE | Admitting: INTERNAL MEDICINE
Payer: MEDICARE

## 2022-11-11 VITALS
HEIGHT: 58 IN | WEIGHT: 130.07 LBS | OXYGEN SATURATION: 96 % | SYSTOLIC BLOOD PRESSURE: 130 MMHG | TEMPERATURE: 98 F | RESPIRATION RATE: 16 BRPM | HEART RATE: 80 BPM | DIASTOLIC BLOOD PRESSURE: 64 MMHG

## 2022-11-11 DIAGNOSIS — R55 SYNCOPE AND COLLAPSE: ICD-10-CM

## 2022-11-11 DIAGNOSIS — I73.9 PERIPHERAL VASCULAR DISEASE, UNSPECIFIED: ICD-10-CM

## 2022-11-11 DIAGNOSIS — E11.9 TYPE 2 DIABETES MELLITUS WITHOUT COMPLICATIONS: ICD-10-CM

## 2022-11-11 DIAGNOSIS — E78.5 HYPERLIPIDEMIA, UNSPECIFIED: ICD-10-CM

## 2022-11-11 DIAGNOSIS — Z29.9 ENCOUNTER FOR PROPHYLACTIC MEASURES, UNSPECIFIED: ICD-10-CM

## 2022-11-11 DIAGNOSIS — I10 ESSENTIAL (PRIMARY) HYPERTENSION: ICD-10-CM

## 2022-11-11 DIAGNOSIS — I25.10 ATHEROSCLEROTIC HEART DISEASE OF NATIVE CORONARY ARTERY WITHOUT ANGINA PECTORIS: ICD-10-CM

## 2022-11-11 DIAGNOSIS — I48.91 UNSPECIFIED ATRIAL FIBRILLATION: ICD-10-CM

## 2022-11-11 DIAGNOSIS — J10.1 INFLUENZA DUE TO OTHER IDENTIFIED INFLUENZA VIRUS WITH OTHER RESPIRATORY MANIFESTATIONS: ICD-10-CM

## 2022-11-11 DIAGNOSIS — E87.1 HYPO-OSMOLALITY AND HYPONATREMIA: ICD-10-CM

## 2022-11-11 LAB
ALBUMIN SERPL ELPH-MCNC: 3 G/DL — LOW (ref 3.5–5)
ALP SERPL-CCNC: 87 U/L — SIGNIFICANT CHANGE UP (ref 40–120)
ALT FLD-CCNC: 28 U/L DA — SIGNIFICANT CHANGE UP (ref 10–60)
ANION GAP SERPL CALC-SCNC: 9 MMOL/L — SIGNIFICANT CHANGE UP (ref 5–17)
APTT BLD: 33.1 SEC — SIGNIFICANT CHANGE UP (ref 27.5–35.5)
AST SERPL-CCNC: 15 U/L — SIGNIFICANT CHANGE UP (ref 10–40)
BASOPHILS # BLD AUTO: 0.02 K/UL — SIGNIFICANT CHANGE UP (ref 0–0.2)
BASOPHILS NFR BLD AUTO: 0.3 % — SIGNIFICANT CHANGE UP (ref 0–2)
BILIRUB SERPL-MCNC: 0.2 MG/DL — SIGNIFICANT CHANGE UP (ref 0.2–1.2)
BUN SERPL-MCNC: 22 MG/DL — HIGH (ref 7–18)
CALCIUM SERPL-MCNC: 9.3 MG/DL — SIGNIFICANT CHANGE UP (ref 8.4–10.5)
CHLORIDE SERPL-SCNC: 91 MMOL/L — LOW (ref 96–108)
CO2 SERPL-SCNC: 26 MMOL/L — SIGNIFICANT CHANGE UP (ref 22–31)
CREAT SERPL-MCNC: 0.84 MG/DL — SIGNIFICANT CHANGE UP (ref 0.5–1.3)
EGFR: 67 ML/MIN/1.73M2 — SIGNIFICANT CHANGE UP
EOSINOPHIL # BLD AUTO: 0.06 K/UL — SIGNIFICANT CHANGE UP (ref 0–0.5)
EOSINOPHIL NFR BLD AUTO: 0.9 % — SIGNIFICANT CHANGE UP (ref 0–6)
FLUAV AG NPH QL: DETECTED
FLUBV AG NPH QL: SIGNIFICANT CHANGE UP
GLUCOSE BLDC GLUCOMTR-MCNC: 222 MG/DL — HIGH (ref 70–99)
GLUCOSE SERPL-MCNC: 168 MG/DL — HIGH (ref 70–99)
HCT VFR BLD CALC: 34.9 % — SIGNIFICANT CHANGE UP (ref 34.5–45)
HGB BLD-MCNC: 11.7 G/DL — SIGNIFICANT CHANGE UP (ref 11.5–15.5)
IMM GRANULOCYTES NFR BLD AUTO: 0.9 % — SIGNIFICANT CHANGE UP (ref 0–0.9)
INR BLD: 1.09 RATIO — SIGNIFICANT CHANGE UP (ref 0.88–1.16)
LACTATE SERPL-SCNC: 3.6 MMOL/L — HIGH (ref 0.7–2)
LYMPHOCYTES # BLD AUTO: 1.06 K/UL — SIGNIFICANT CHANGE UP (ref 1–3.3)
LYMPHOCYTES # BLD AUTO: 15.9 % — SIGNIFICANT CHANGE UP (ref 13–44)
MCHC RBC-ENTMCNC: 27 PG — SIGNIFICANT CHANGE UP (ref 27–34)
MCHC RBC-ENTMCNC: 33.5 GM/DL — SIGNIFICANT CHANGE UP (ref 32–36)
MCV RBC AUTO: 80.6 FL — SIGNIFICANT CHANGE UP (ref 80–100)
MONOCYTES # BLD AUTO: 0.46 K/UL — SIGNIFICANT CHANGE UP (ref 0–0.9)
MONOCYTES NFR BLD AUTO: 6.9 % — SIGNIFICANT CHANGE UP (ref 2–14)
NEUTROPHILS # BLD AUTO: 5.01 K/UL — SIGNIFICANT CHANGE UP (ref 1.8–7.4)
NEUTROPHILS NFR BLD AUTO: 75.1 % — SIGNIFICANT CHANGE UP (ref 43–77)
NRBC # BLD: 0 /100 WBCS — SIGNIFICANT CHANGE UP (ref 0–0)
NT-PROBNP SERPL-SCNC: 352 PG/ML — SIGNIFICANT CHANGE UP (ref 0–450)
PLATELET # BLD AUTO: 161 K/UL — SIGNIFICANT CHANGE UP (ref 150–400)
POTASSIUM SERPL-MCNC: 3.7 MMOL/L — SIGNIFICANT CHANGE UP (ref 3.5–5.3)
POTASSIUM SERPL-SCNC: 3.7 MMOL/L — SIGNIFICANT CHANGE UP (ref 3.5–5.3)
PROT SERPL-MCNC: 6.7 G/DL — SIGNIFICANT CHANGE UP (ref 6–8.3)
PROTHROM AB SERPL-ACNC: 13 SEC — SIGNIFICANT CHANGE UP (ref 10.5–13.4)
RBC # BLD: 4.33 M/UL — SIGNIFICANT CHANGE UP (ref 3.8–5.2)
RBC # FLD: 14.1 % — SIGNIFICANT CHANGE UP (ref 10.3–14.5)
SARS-COV-2 RNA SPEC QL NAA+PROBE: SIGNIFICANT CHANGE UP
SODIUM SERPL-SCNC: 126 MMOL/L — LOW (ref 135–145)
TROPONIN I, HIGH SENSITIVITY RESULT: 8.3 NG/L — SIGNIFICANT CHANGE UP
WBC # BLD: 6.67 K/UL — SIGNIFICANT CHANGE UP (ref 3.8–10.5)
WBC # FLD AUTO: 6.67 K/UL — SIGNIFICANT CHANGE UP (ref 3.8–10.5)

## 2022-11-11 PROCEDURE — 99285 EMERGENCY DEPT VISIT HI MDM: CPT

## 2022-11-11 PROCEDURE — 70450 CT HEAD/BRAIN W/O DYE: CPT | Mod: 26

## 2022-11-11 PROCEDURE — 71045 X-RAY EXAM CHEST 1 VIEW: CPT | Mod: 26

## 2022-11-11 RX ORDER — ACETAMINOPHEN 500 MG
650 TABLET ORAL EVERY 6 HOURS
Refills: 0 | Status: DISCONTINUED | OUTPATIENT
Start: 2022-11-11 | End: 2022-11-17

## 2022-11-11 RX ORDER — SODIUM CHLORIDE 9 MG/ML
1000 INJECTION INTRAMUSCULAR; INTRAVENOUS; SUBCUTANEOUS ONCE
Refills: 0 | Status: COMPLETED | OUTPATIENT
Start: 2022-11-11 | End: 2022-11-11

## 2022-11-11 RX ORDER — METOPROLOL TARTRATE 50 MG
12.5 TABLET ORAL EVERY 12 HOURS
Refills: 0 | Status: DISCONTINUED | OUTPATIENT
Start: 2022-11-11 | End: 2022-11-17

## 2022-11-11 RX ORDER — AMLODIPINE BESYLATE 2.5 MG/1
1 TABLET ORAL
Qty: 0 | Refills: 0 | DISCHARGE

## 2022-11-11 RX ORDER — ENOXAPARIN SODIUM 100 MG/ML
60 INJECTION SUBCUTANEOUS EVERY 12 HOURS
Refills: 0 | Status: DISCONTINUED | OUTPATIENT
Start: 2022-11-11 | End: 2022-11-16

## 2022-11-11 RX ORDER — ATORVASTATIN CALCIUM 80 MG/1
40 TABLET, FILM COATED ORAL AT BEDTIME
Refills: 0 | Status: DISCONTINUED | OUTPATIENT
Start: 2022-11-11 | End: 2022-11-17

## 2022-11-11 RX ORDER — INSULIN LISPRO 100/ML
VIAL (ML) SUBCUTANEOUS
Refills: 0 | Status: DISCONTINUED | OUTPATIENT
Start: 2022-11-11 | End: 2022-11-17

## 2022-11-11 RX ORDER — INSULIN LISPRO 100/ML
VIAL (ML) SUBCUTANEOUS AT BEDTIME
Refills: 0 | Status: DISCONTINUED | OUTPATIENT
Start: 2022-11-11 | End: 2022-11-16

## 2022-11-11 RX ORDER — ATORVASTATIN CALCIUM 80 MG/1
1 TABLET, FILM COATED ORAL
Qty: 0 | Refills: 0 | DISCHARGE

## 2022-11-11 RX ORDER — SODIUM CHLORIDE 9 MG/ML
1000 INJECTION INTRAMUSCULAR; INTRAVENOUS; SUBCUTANEOUS
Refills: 0 | Status: DISCONTINUED | OUTPATIENT
Start: 2022-11-11 | End: 2022-11-13

## 2022-11-11 RX ORDER — ALBUTEROL 90 UG/1
2 AEROSOL, METERED ORAL EVERY 6 HOURS
Refills: 0 | Status: DISCONTINUED | OUTPATIENT
Start: 2022-11-11 | End: 2022-11-17

## 2022-11-11 RX ADMIN — SODIUM CHLORIDE 50 MILLILITER(S): 9 INJECTION INTRAMUSCULAR; INTRAVENOUS; SUBCUTANEOUS at 21:10

## 2022-11-11 RX ADMIN — SODIUM CHLORIDE 1000 MILLILITER(S): 9 INJECTION INTRAMUSCULAR; INTRAVENOUS; SUBCUTANEOUS at 16:10

## 2022-11-11 RX ADMIN — ATORVASTATIN CALCIUM 40 MILLIGRAM(S): 80 TABLET, FILM COATED ORAL at 21:10

## 2022-11-11 NOTE — H&P ADULT - PROBLEM SELECTOR PLAN 5
patient has history of PVD with varicose veins b/l on exam  takes ezetimibe 10 mg qd and xarelto 2.5 mg qhs for dvt ppxper surescripts  Unable to perform MEDREC as pharmacy is closed - [facility] [phone]    - will hold xarelto for now until [facility] results

## 2022-11-11 NOTE — H&P ADULT - PROBLEM/PLAN-9
32 y o    female at 31 1 wga EGA for PNV  BP : 110/68  TWlb    Patient is doing well today and has minimal complaints  Patient denies contractions, bleeding or leakage of fluid  Good fetal movement  28 wk labs reviewed  - 1hr , Hgb 11 2, plts 247  Baby & Me booklet given and reviewed  Consent signed  Ok to blood transfusion  Full Code  Patient plans to breastfeed  Skin to skin, rooming in, delayed cord clamp,  pain management in labor discussed  TDAP recommendations discussed  TDAP administered  Rhogam not indicated  Fetal kick counts reviewed  Follow up in 2 weeks 
Routine prenatal, no complaints   Pt  Due for Red folder  Tdap vaccine administered today, see "Immunizations" tab for documentation    OB Urine dip: Negative Glucose/Negative Protein
DISPLAY PLAN FREE TEXT

## 2022-11-11 NOTE — ED PROVIDER NOTE - CLINICAL SUMMARY MEDICAL DECISION MAKING FREE TEXT BOX
Elderly female with 2 episodes of syncope at home.  EMS gave report to triage nurse who did not note episode of possible seizure.  Plan for fingerstick, EKG, labs, orthostatics, reassess

## 2022-11-11 NOTE — H&P ADULT - PROBLEM SELECTOR PLAN 3
Pt tested positive for influenza A in ED 11/11 Pt tested positive for influenza A in ED 11/11    - tamiflu 75 mg bid for 5 days  - prn acetaminophen for fever/headache  - droplet isolation

## 2022-11-11 NOTE — H&P ADULT - PROBLEM SELECTOR PLAN 1
patient presents with 2 episodes of LOC without head trauma, more likely to be syncope in the setting of new arrhythmia (aflutter) as well as acute viral URI (influenza A)    - telemetry monitoring  - serial troponin, EKG  - TTE  - Cardiology consult, Dr. Rivera  - Orthostatic vitals to rule out orthostatic causes of syncope patient presents with 2 episodes of LOC without head trauma, more likely to be syncope in the setting of new arrhythmia (aflutter) as well as acute viral URI (influenza A)    - telemetry monitoring  - serial troponin, EKG  - TTE  - Cardiology consult, Dr. Rivera  - Orthostatic vitals to rule out orthostatic causes of syncope  - CT head to rule out intracranial hemorrhage/CVA

## 2022-11-11 NOTE — H&P ADULT - PROBLEM SELECTOR PLAN 4
patient presents with hyponatremia to 126 on admission s/p 1 L NS in ED and fluids by EMS as well  likely due to dehydration in the setting of acute viral URI    - c/w IV hydration  - monitor Na  - consider further workup of hyponatremia if no improvement with IVF

## 2022-11-11 NOTE — H&P ADULT - PROBLEM SELECTOR PLAN 7
patient takes glipizide 5 mg ER qd, farxiga 10 mg qd per levi  Unable to perform MEDREC as pharmacy is closed - [facility] [phone]    - insulin sliding scale [facility]  - monitor glucose

## 2022-11-11 NOTE — ED PROVIDER NOTE - PROGRESS NOTE DETAILS
EKG shows A. fib  Based on medications and family at bedside providing history, this is a new diagnosis.  Plan for admission to telemetry.  PCP admits to Dr. Matta.  Endorsed to MAR.

## 2022-11-11 NOTE — H&P ADULT - PROBLEM SELECTOR PLAN 9
dvt ppx, requires full dose lovenox in the setting of atrial flutter however will hold for now until [facility] results

## 2022-11-11 NOTE — ED PROVIDER NOTE - PHYSICAL EXAMINATION
GENERAL: well appearing, no acute distress   HEAD: atraumatic   EYES: EOMI   ENT: moist oral mucosa   CARDIAC: regular rate, good distal pulses   RESPIRATORY: no increased work of breathing, lungs clear  ABDO: soft NTND  MUSCULOSKELETAL: no deformity   NEUROLOGICAL: alert, spontaneous movement of extremities   SKIN: no visible rash  PSYCHIATRIC: cooperative

## 2022-11-11 NOTE — ED PROVIDER NOTE - TOBACCO USE
Unknown if ever smoked Ear Star Wedge Flap Text: The defect edges were debeveled with a #15 blade scalpel.  Given the location of the defect and the proximity to free margins (helical rim) an ear star wedge flap was deemed most appropriate.  Using a sterile surgical marker, the appropriate flap was drawn incorporating the defect and placing the expected incisions between the helical rim and antihelix where possible.  The area thus outlined was incised through and through with a #15 scalpel blade.

## 2022-11-11 NOTE — H&P ADULT - NSHPPHYSICALEXAM_GEN_ALL_CORE
PHYSICAL EXAM:  GENERAL: NAD, lying in bed   HEAD:  Atraumatic, Normocephalic  EYES: EOMI, PERRLA, conjunctiva and sclera clear  ENT: Dry mucous membranes  NECK: Supple, No JVD  CHEST/LUNG: Clear to auscultation bilaterally; No rales, rhonchi, wheezing, or rubs  HEART: irregular rhythm; normal rate; No murmurs, rubs, or gallops  ABDOMEN: Bowel sounds present; Soft, Nontender, Nondistended  EXTREMITIES:  2+ Peripheral Pulses, brisk capillary refill. No clubbing, cyanosis, or edema, varicose veins b/l  NERVOUS SYSTEM:  Alert & Oriented X3, speech clear. No deficits   MSK: FROM all 4 extremities, full and equal strength  SKIN: No rashes or lesions

## 2022-11-11 NOTE — ED ADULT NURSE NOTE - ALCOHOL PRE SCREEN (AUDIT - C)
"Subjective:    Patient ID:  Margie Trevino is a 60 y.o. y.o. female who presents for f/u visit for Injury and Pain of the Left Elbow      Patient returns for left elbow injury. Reports that she tripped and fell on 4/12/2022. She sought initial evaluation at [facility] ED where x-rays of the left elbow were obtained and reported to show a radial head fracture. She was treated with a posterior ortho-glass splint and sling. She is referred for orthopedic follow-up care.         Objective:     BP (!) 148/97 (BP Location: Right arm, Patient Position: Sitting, BP Method: Small (Automatic))   Pulse 70   Ht 5' 1" (1.549 m)   Wt 60.7 kg (133 lb 14.9 oz)   BMI 25.31 kg/m²     Ortho Exam     61 yo female in NAD; alert, oriented x 3    Head: atraumatic  Eyes: EOM are normal. Right eye exhibits no discharge. Left eye exhibits no discharge  Cardiovascular: normal rate    Pulmonary/Chest: effort normal; no respiratory distress  Abdominal: soft    LUE: splint removed; N/V intact; skin intact; NT forearm/wrist    Left elbow: no swelling; tender radial head; ROM: -15 to 120 flexion; -15 supination, -5 pronation secondary to pain; no gross ligamentous laxity      Imaging:     X- rays 3-view left elbow dated 4/12/2022 are independently reviewed by me and show a minimally displaced intra-articular radial head fracture.      Assessment & Plan:     1. Closed displaced fracture of head of left radius, initial encounter      1.  Findings, diagnosis, treatment options/risks/benefits were reviewed  2.  Left arm sling support x additional 2-3 days only  3.  Minimum TID left elbow ROM exercises to comfort  4.  Progressive increase in left arm use to tolerance; advised avoidance of heavy lifting and/or weight-bearing LUE x 4-6 weeks  5.  Follow-up prn; recommend PCP f/u re: osteoporosis sean/l/rx; patient informed that I will be leaving the practice here at the end of the month and will not be available to provided her follow-up care  " Statement Selected

## 2022-11-11 NOTE — H&P ADULT - HISTORY OF PRESENT ILLNESS
87 year old F with PMH of HTN, HLD, DM, recent URI presents after two syncopal episodes     ED Course  Vitals /64 P 80 R 16 T 97.7F SpO2 96% RA  Meds 87 year old F with PMH of HTN, HLD, DM, recent URI presents after two syncopal episodes     ED Course  Vitals /64 P 80 R 16 T 97.7F SpO2 96% RA  Meds: 1 L NS   87 year old F with PMH of HTN, HLD, DM, recent URI presents after two episodes of loss of consciousness at home. Patient seen with grandchildren at bedside. State that the patient called out for help from the bathroom when it appeared she had lost consciousness on the toilet after a bowel movement. Denies head trauma, tongue bite or urinary incontinence but unclear if bowel incontinence or patient already had a bowel movement prior to episode of loss of consciousness. Patient's family then called EMS, and upon EMS arrival, granddaughter states that she once again lost consciousness and was told it was a "silent seizure" by EMS as she had constricted pupils and low blood pressure. Pt's granddaughter denies any urinary or bowel incontinence, jerky movements, eyes rolling back, or tongue bite. Patient reports headache and chills at this time, and denies dizziness, fever, n/v/d, lightheadedness, chest pain, shortness of breath, changes in urinary or bowel habits.     ED Course  Vitals /64 P 80 R 16 T 97.7F SpO2 96% RA  Meds: 1 L NS  EKG: A flutter @ 74 bpm

## 2022-11-11 NOTE — H&P ADULT - PROBLEM SELECTOR PLAN 2
patient presents with A flutter on EKG, irregular rhythm on exam  rate controlled, new-onset as patient does not have known history of arrhythmia patient presents with A flutter on EKG, irregular rhythm on exam  rate controlled, new-onset as patient does not have known history of arrhythmia  CHADSVASC 6  patient takes metoprolol ER 50 mg qd per surescriiron  Unable to perform MEDREC as pharmacy is closed - [facility] [phone]    - continue with low dose lopressor twice daily for rate control, increase as tolerated/necessary  - CT head to rule out intracranial hemorrhage/CVA  - await CT results prior to starting full dose AC with lovenox to rule out hemorrhage  - TTE to rule out valvular atrial arrhythmia

## 2022-11-11 NOTE — H&P ADULT - PROBLEM SELECTOR PLAN 6
patient takes metoprolol ER 50 mg qd, HCTZ 25 mg qd, losartan 25 mg qd, amlodipine 10 mg qd per levi  Unable to perform MEDREC as pharmacy is closed - [facility] [phone]    - continue with low dose lopressor twice daily with holding parameters  - monitor BP and resume meds as above as needed

## 2022-11-11 NOTE — ED PROVIDER NOTE - OBJECTIVE STATEMENT
87-year-old female past medical history of hypertension, hyperlipidemia, diabetes, recently diagnosed with upper respiratory infection and on antibiotics, presents from home status episode of syncope.  Family at bedside states that patient was finishing taking a shower and called out for help.  Family found patient sitting on the toilet and then saw patient slumped over and caught her without any trauma.  EMS arrived to the home and noted a second episode of syncope with questionable seizure-like activity and hypotension.  Patient currently complaining of mild headache but denies other acute complaints Irish translation via family at bedside

## 2022-11-11 NOTE — H&P ADULT - ATTENDING COMMENTS
87 year old F with PMH of HTN, HLD, DM, recent URI presents after two episodes of loss of consciousness at home. Patient seen with grandchildren at bedside. State that the patient called out for help from the bathroom when it appeared she had lost consciousness on the toilet after a bowel movement. Denies head trauma, tongue bite or urinary incontinence but unclear if bowel incontinence or patient already had a bowel movement prior to episode of loss of consciousness. Patient's family then called EMS, and upon EMS arrival, granddaughter states that she once again lost consciousness and was told it was a "silent seizure" by EMS as she had constricted pupils and low blood pressure. Pt's granddaughter denies any urinary or bowel incontinence, jerky movements, eyes rolling back, or tongue bite. Patient reports headache and chills at this time, and denies dizziness, fever, n/v/d, lightheadedness, chest pain, shortness of breath, changes in urinary or bowel habits.     ED Course  Vitals /64 P 80 R 16 T 97.7F SpO2 96% RA  Meds: 1 L NS  EKG: A flutter @ 74 bpm         assessment   --- syncope, r/o cns patho, afib, flu a, h/o HTN, HLD, DM,    plan  --  adm to tele, aspirin, statin, lopressor, tamiflu, lovenox 1mg/kg q12, hold xarelto, cont preadmit home meds, gi and dvt prophylaxis  cbc, bmp, mg, phos, lipid, tsh, hgba1c, ce q8 x3    echo      cardio cons  neuro cons.

## 2022-11-11 NOTE — H&P ADULT - ASSESSMENT
87 year old F with PMH of HTN, HLD, DM, recent URI presents after two syncopal episodes admitted for syncopal workup   87 year old F with PMH of HTN, HLD, DM, recent URI presents after two syncopal episodes admitted for syncopal workup in the setting of A flutter and influenza A+.   87 year old F with PMH of HTN, HLD, DM, recent URI presents after two syncopal episodes admitted for syncopal workup in the setting of A flutter and influenza A+.    Unable to perform MEDREC as pharmacy is closed - [facility] [phone]

## 2022-11-11 NOTE — H&P ADULT - PROBLEM SELECTOR PLAN 8
patient takes atorvastatin 40 mg qhs and ezetimibe 10 mg qd per levi  Unable to perform MEDREC as pharmacy is closed - [facility] [phone]    - c/w atorvastatin 40 mg qhs for now until Medrec is complete

## 2022-11-12 DIAGNOSIS — J45.909 UNSPECIFIED ASTHMA, UNCOMPLICATED: ICD-10-CM

## 2022-11-12 LAB
A1C WITH ESTIMATED AVERAGE GLUCOSE RESULT: 7.6 % — HIGH (ref 4–5.6)
ANION GAP SERPL CALC-SCNC: 5 MMOL/L — SIGNIFICANT CHANGE UP (ref 5–17)
ANION GAP SERPL CALC-SCNC: 9 MMOL/L — SIGNIFICANT CHANGE UP (ref 5–17)
BUN SERPL-MCNC: 15 MG/DL — SIGNIFICANT CHANGE UP (ref 7–18)
BUN SERPL-MCNC: 18 MG/DL — SIGNIFICANT CHANGE UP (ref 7–18)
CALCIUM SERPL-MCNC: 8.9 MG/DL — SIGNIFICANT CHANGE UP (ref 8.4–10.5)
CALCIUM SERPL-MCNC: 9.2 MG/DL — SIGNIFICANT CHANGE UP (ref 8.4–10.5)
CHLORIDE SERPL-SCNC: 95 MMOL/L — LOW (ref 96–108)
CHLORIDE SERPL-SCNC: 96 MMOL/L — SIGNIFICANT CHANGE UP (ref 96–108)
CO2 SERPL-SCNC: 24 MMOL/L — SIGNIFICANT CHANGE UP (ref 22–31)
CO2 SERPL-SCNC: 27 MMOL/L — SIGNIFICANT CHANGE UP (ref 22–31)
CREAT SERPL-MCNC: 0.6 MG/DL — SIGNIFICANT CHANGE UP (ref 0.5–1.3)
CREAT SERPL-MCNC: 0.96 MG/DL — SIGNIFICANT CHANGE UP (ref 0.5–1.3)
EGFR: 57 ML/MIN/1.73M2 — LOW
EGFR: 87 ML/MIN/1.73M2 — SIGNIFICANT CHANGE UP
ESTIMATED AVERAGE GLUCOSE: 171 MG/DL — HIGH (ref 68–114)
GLUCOSE BLDC GLUCOMTR-MCNC: 158 MG/DL — HIGH (ref 70–99)
GLUCOSE BLDC GLUCOMTR-MCNC: 172 MG/DL — HIGH (ref 70–99)
GLUCOSE BLDC GLUCOMTR-MCNC: 194 MG/DL — HIGH (ref 70–99)
GLUCOSE BLDC GLUCOMTR-MCNC: 278 MG/DL — HIGH (ref 70–99)
GLUCOSE SERPL-MCNC: 175 MG/DL — HIGH (ref 70–99)
GLUCOSE SERPL-MCNC: 253 MG/DL — HIGH (ref 70–99)
HCT VFR BLD CALC: 32.6 % — LOW (ref 34.5–45)
HGB BLD-MCNC: 10.8 G/DL — LOW (ref 11.5–15.5)
LACTATE SERPL-SCNC: 4 MMOL/L — CRITICAL HIGH (ref 0.7–2)
MAGNESIUM SERPL-MCNC: 1.4 MG/DL — LOW (ref 1.6–2.6)
MCHC RBC-ENTMCNC: 26.9 PG — LOW (ref 27–34)
MCHC RBC-ENTMCNC: 33.1 GM/DL — SIGNIFICANT CHANGE UP (ref 32–36)
MCV RBC AUTO: 81.1 FL — SIGNIFICANT CHANGE UP (ref 80–100)
NRBC # BLD: 0 /100 WBCS — SIGNIFICANT CHANGE UP (ref 0–0)
PHOSPHATE SERPL-MCNC: 2.1 MG/DL — LOW (ref 2.5–4.5)
PLATELET # BLD AUTO: 156 K/UL — SIGNIFICANT CHANGE UP (ref 150–400)
POTASSIUM SERPL-MCNC: 3.5 MMOL/L — SIGNIFICANT CHANGE UP (ref 3.5–5.3)
POTASSIUM SERPL-MCNC: 3.5 MMOL/L — SIGNIFICANT CHANGE UP (ref 3.5–5.3)
POTASSIUM SERPL-SCNC: 3.5 MMOL/L — SIGNIFICANT CHANGE UP (ref 3.5–5.3)
POTASSIUM SERPL-SCNC: 3.5 MMOL/L — SIGNIFICANT CHANGE UP (ref 3.5–5.3)
RBC # BLD: 4.02 M/UL — SIGNIFICANT CHANGE UP (ref 3.8–5.2)
RBC # FLD: 14 % — SIGNIFICANT CHANGE UP (ref 10.3–14.5)
SODIUM SERPL-SCNC: 128 MMOL/L — LOW (ref 135–145)
SODIUM SERPL-SCNC: 128 MMOL/L — LOW (ref 135–145)
TROPONIN I, HIGH SENSITIVITY RESULT: 7.4 NG/L — SIGNIFICANT CHANGE UP
WBC # BLD: 3.16 K/UL — LOW (ref 3.8–10.5)
WBC # FLD AUTO: 3.16 K/UL — LOW (ref 3.8–10.5)

## 2022-11-12 RX ORDER — PANTOPRAZOLE SODIUM 20 MG/1
40 TABLET, DELAYED RELEASE ORAL
Refills: 0 | Status: DISCONTINUED | OUTPATIENT
Start: 2022-11-12 | End: 2022-11-17

## 2022-11-12 RX ORDER — MAGNESIUM SULFATE 500 MG/ML
1 VIAL (ML) INJECTION ONCE
Refills: 0 | Status: COMPLETED | OUTPATIENT
Start: 2022-11-12 | End: 2022-11-12

## 2022-11-12 RX ADMIN — Medication 650 MILLIGRAM(S): at 01:04

## 2022-11-12 RX ADMIN — Medication 75 MILLIGRAM(S): at 17:47

## 2022-11-12 RX ADMIN — Medication 650 MILLIGRAM(S): at 14:33

## 2022-11-12 RX ADMIN — Medication 40 MILLIGRAM(S): at 17:48

## 2022-11-12 RX ADMIN — ALBUTEROL 2 PUFF(S): 90 AEROSOL, METERED ORAL at 01:12

## 2022-11-12 RX ADMIN — Medication 1: at 12:34

## 2022-11-12 RX ADMIN — Medication 650 MILLIGRAM(S): at 00:26

## 2022-11-12 RX ADMIN — Medication 3: at 17:47

## 2022-11-12 RX ADMIN — ATORVASTATIN CALCIUM 40 MILLIGRAM(S): 80 TABLET, FILM COATED ORAL at 21:21

## 2022-11-12 RX ADMIN — Medication 85 MILLIMOLE(S): at 12:31

## 2022-11-12 RX ADMIN — Medication 1: at 08:36

## 2022-11-12 RX ADMIN — Medication 12.5 MILLIGRAM(S): at 17:48

## 2022-11-12 RX ADMIN — Medication 650 MILLIGRAM(S): at 10:24

## 2022-11-12 RX ADMIN — Medication 75 MILLIGRAM(S): at 05:20

## 2022-11-12 RX ADMIN — Medication 12.5 MILLIGRAM(S): at 05:20

## 2022-11-12 RX ADMIN — Medication 100 GRAM(S): at 10:15

## 2022-11-12 RX ADMIN — Medication 200 MILLIGRAM(S): at 12:37

## 2022-11-12 RX ADMIN — ENOXAPARIN SODIUM 60 MILLIGRAM(S): 100 INJECTION SUBCUTANEOUS at 17:49

## 2022-11-12 RX ADMIN — ALBUTEROL 2 PUFF(S): 90 AEROSOL, METERED ORAL at 12:37

## 2022-11-12 RX ADMIN — SODIUM CHLORIDE 50 MILLILITER(S): 9 INJECTION INTRAMUSCULAR; INTRAVENOUS; SUBCUTANEOUS at 01:13

## 2022-11-12 RX ADMIN — ENOXAPARIN SODIUM 60 MILLIGRAM(S): 100 INJECTION SUBCUTANEOUS at 05:20

## 2022-11-12 NOTE — PROGRESS NOTE ADULT - SUBJECTIVE AND OBJECTIVE BOX
Patient is a 87y old  Female who presents with a chief complaint of Syncope; new onset Afib (11 Nov 2022 17:55)    pt seen in icu [  ], reg med floor [   ], bed [  ], chair at bedside [   ], a+o x3 [  ], lethargic [  ],  nad [  ]    harris [  ], ngt [  ], peg [  ], et tube [  ], cent line [  ], picc line [  ]        Allergies    No Known Allergies        Vitals    T(F): 98 (11-12-22 @ 05:06), Max: 98.7 (11-12-22 @ 00:13)  HR: 70 (11-12-22 @ 05:06) (69 - 80)  BP: 132/76 (11-12-22 @ 05:06) (123/74 - 133/80)  RR: 16 (11-12-22 @ 05:06) (16 - 16)  SpO2: 97% (11-12-22 @ 05:06) (94% - 97%)  Wt(kg): --  CAPILLARY BLOOD GLUCOSE      POCT Blood Glucose.: 222 mg/dL (11 Nov 2022 21:46)      Labs                          11.7   6.67  )-----------( 161      ( 11 Nov 2022 15:50 )             34.9       11-12    128<L>  |  95<L>  |  18  ----------------------------<  253<H>  3.5   |  24  |  0.96    Ca    8.9      12 Nov 2022 01:22    TPro  6.7  /  Alb  3.0<L>  /  TBili  0.2  /  DBili  x   /  AST  15  /  ALT  28  /  AlkPhos  87  11-11          Troponin I, High Sensitivity Result: 7.4 ng/L (11-12-22 @ 01:22)  Troponin I, High Sensitivity Result: 8.3 ng/L (11-11-22 @ 15:50)        Radiology Results      Meds    MEDICATIONS  (STANDING):  atorvastatin 40 milliGRAM(s) Oral at bedtime  enoxaparin Injectable 60 milliGRAM(s) SubCutaneous every 12 hours  insulin lispro (ADMELOG) corrective regimen sliding scale   SubCutaneous three times a day before meals  insulin lispro (ADMELOG) corrective regimen sliding scale   SubCutaneous at bedtime  metoprolol tartrate 12.5 milliGRAM(s) Oral every 12 hours  oseltamivir 75 milliGRAM(s) Oral two times a day  sodium chloride 0.9%. 1000 milliLiter(s) (50 mL/Hr) IV Continuous <Continuous>      MEDICATIONS  (PRN):  acetaminophen     Tablet .. 650 milliGRAM(s) Oral every 6 hours PRN Temp greater or equal to 38C (100.4F), Mild Pain (1 - 3)  albuterol    90 MICROgram(s) HFA Inhaler 2 Puff(s) Inhalation every 6 hours PRN Shortness of Breath and/or Wheezing      Physical Exam    Neuro :  no focal deficits  Respiratory: CTA B/L  CV: RRR, S1S2, no murmurs,   Abdominal: Soft, NT, ND +BS,  Extremities: No edema, + peripheral pulses    ASSESSMENT    syncope,   r/o cns patho,   afib,   flu a,   h/o HTN,   HLD,  DM,      Syncope and collapse    Diabetes    History of hypertension    Hyperlipidemia    CAD (coronary artery disease)    No significant past surgical history        PLAN  adm to tele,   aspirin,   statin,   lopressor,   tamiflu,   lovenox 1mg/kg q12,   hold xarelto,   cont preadmit home meds,   gi and dvt prophylaxis  cbc,   bmp,   mg,   phos,  lipid,   tsh,   hgba1c,   ce q8 x3    echo      cardio cons  neuro cons.        Patient is a 87y old  Female who presents with a chief complaint of Syncope; new onset Afib (11 Nov 2022 17:55)    pt seen in tele [ x ], reg med floor [  ], bed [x  ], chair at bedside [   ], a+o x3 [ x ], lethargic [  ],  nad [ x ]    Allergies    No Known Allergies        Vitals    T(F): 98 (11-12-22 @ 05:06), Max: 98.7 (11-12-22 @ 00:13)  HR: 70 (11-12-22 @ 05:06) (69 - 80)  BP: 132/76 (11-12-22 @ 05:06) (123/74 - 133/80)  RR: 16 (11-12-22 @ 05:06) (16 - 16)  SpO2: 97% (11-12-22 @ 05:06) (94% - 97%)  Wt(kg): --  CAPILLARY BLOOD GLUCOSE      POCT Blood Glucose.: 222 mg/dL (11 Nov 2022 21:46)      Labs                          11.7   6.67  )-----------( 161      ( 11 Nov 2022 15:50 )             34.9       11-12    128<L>  |  95<L>  |  18  ----------------------------<  253<H>  3.5   |  24  |  0.96    Ca    8.9      12 Nov 2022 01:22    TPro  6.7  /  Alb  3.0<L>  /  TBili  0.2  /  DBili  x   /  AST  15  /  ALT  28  /  AlkPhos  87  11-11          Troponin I, High Sensitivity Result: 7.4 ng/L (11-12-22 @ 01:22)  Troponin I, High Sensitivity Result: 8.3 ng/L (11-11-22 @ 15:50)        Radiology Results    < from: CT Head No Cont (11.11.22 @ 20:12) >  IMPRESSION:    No evidence of acute intracranial hemorrhage, midline shift or CT   evidence of acute territorial infarct.    < end of copied text >      Meds    MEDICATIONS  (STANDING):  atorvastatin 40 milliGRAM(s) Oral at bedtime  enoxaparin Injectable 60 milliGRAM(s) SubCutaneous every 12 hours  insulin lispro (ADMELOG) corrective regimen sliding scale   SubCutaneous three times a day before meals  insulin lispro (ADMELOG) corrective regimen sliding scale   SubCutaneous at bedtime  metoprolol tartrate 12.5 milliGRAM(s) Oral every 12 hours  oseltamivir 75 milliGRAM(s) Oral two times a day  sodium chloride 0.9%. 1000 milliLiter(s) (50 mL/Hr) IV Continuous <Continuous>      MEDICATIONS  (PRN):  acetaminophen     Tablet .. 650 milliGRAM(s) Oral every 6 hours PRN Temp greater or equal to 38C (100.4F), Mild Pain (1 - 3)  albuterol    90 MICROgram(s) HFA Inhaler 2 Puff(s) Inhalation every 6 hours PRN Shortness of Breath and/or Wheezing      Physical Exam    Neuro :  no focal deficits  Respiratory: CTA B/L  CV: RRR, S1S2, no murmurs,   Abdominal: Soft, NT, ND +BS,  Extremities: No edema, + peripheral pulses      ASSESSMENT    syncope,   r/o cns patho,   afib,   flu a,   hyponatremia,  hypophosphatemia  hypomagnesemia  h/o HTN,   HLD,  DM,  CAD (coronary artery disease)      PLAN     cont tele,   aspirin, statin,   neuro cons  ct head with No evidence of acute intracranial hemorrhage, midline shift or CT evidence of acute territorial infarct noted above.  trop x2 neg noted above   cardio cons  cont lopressor,  lovenoc 1mg/kg q 12  hold xarelto   cont tamiflu,   sup mg, phos and sodium   renal cons   f/u hgba1c,   lispro ss   cont current meds        Patient is a 87y old  Female who presents with a chief complaint of Syncope; new onset Afib (11 Nov 2022 17:55)    pt seen in tele [ x ], reg med floor [  ], bed [x  ], chair at bedside [   ], a+o x3 [ x ], lethargic [  ],  nad [ x ]    Allergies    No Known Allergies        Vitals    T(F): 98 (11-12-22 @ 05:06), Max: 98.7 (11-12-22 @ 00:13)  HR: 70 (11-12-22 @ 05:06) (69 - 80)  BP: 132/76 (11-12-22 @ 05:06) (123/74 - 133/80)  RR: 16 (11-12-22 @ 05:06) (16 - 16)  SpO2: 97% (11-12-22 @ 05:06) (94% - 97%)  Wt(kg): --  CAPILLARY BLOOD GLUCOSE      POCT Blood Glucose.: 222 mg/dL (11 Nov 2022 21:46)      Labs                          11.7   6.67  )-----------( 161      ( 11 Nov 2022 15:50 )             34.9       11-12    128<L>  |  95<L>  |  18  ----------------------------<  253<H>  3.5   |  24  |  0.96    Ca    8.9      12 Nov 2022 01:22    TPro  6.7  /  Alb  3.0<L>  /  TBili  0.2  /  DBili  x   /  AST  15  /  ALT  28  /  AlkPhos  87  11-11          Troponin I, High Sensitivity Result: 7.4 ng/L (11-12-22 @ 01:22)  Troponin I, High Sensitivity Result: 8.3 ng/L (11-11-22 @ 15:50)        Radiology Results    < from: CT Head No Cont (11.11.22 @ 20:12) >  IMPRESSION:    No evidence of acute intracranial hemorrhage, midline shift or CT   evidence of acute territorial infarct.    < end of copied text >      Meds    MEDICATIONS  (STANDING):  atorvastatin 40 milliGRAM(s) Oral at bedtime  enoxaparin Injectable 60 milliGRAM(s) SubCutaneous every 12 hours  insulin lispro (ADMELOG) corrective regimen sliding scale   SubCutaneous three times a day before meals  insulin lispro (ADMELOG) corrective regimen sliding scale   SubCutaneous at bedtime  metoprolol tartrate 12.5 milliGRAM(s) Oral every 12 hours  oseltamivir 75 milliGRAM(s) Oral two times a day  sodium chloride 0.9%. 1000 milliLiter(s) (50 mL/Hr) IV Continuous <Continuous>      MEDICATIONS  (PRN):  acetaminophen     Tablet .. 650 milliGRAM(s) Oral every 6 hours PRN Temp greater or equal to 38C (100.4F), Mild Pain (1 - 3)  albuterol    90 MICROgram(s) HFA Inhaler 2 Puff(s) Inhalation every 6 hours PRN Shortness of Breath and/or Wheezing      Physical Exam    Neuro :  no focal deficits  Respiratory: B/L wheeze  CV: RRR, S1S2, no murmurs,   Abdominal: Soft, NT, ND +BS,  Extremities: No edema, + peripheral pulses      ASSESSMENT    syncope,   r/o cns patho,   afib,   flu a,   hyponatremia,  hypophosphatemia  hypomagnesemia  h/o HTN,   HLD,  DM,  CAD (coronary artery disease)      PLAN     cont tele,   aspirin, statin,   neuro cons  ct head with No evidence of acute intracranial hemorrhage, midline shift or CT evidence of acute territorial infarct noted above.  trop x2 neg noted above   cardio cons  cont lopressor,  lovenoc 1mg/kg q 12  hold xarelto   cont tamiflu,   pulm cons   start solumedrol 40 mg q12  robitussin dm prn cough   albuterol prn  sup mg, phos and sodium   renal cons   f/u hgba1c,   lispro ss   cont current meds

## 2022-11-12 NOTE — CONSULT NOTE ADULT - SUBJECTIVE AND OBJECTIVE BOX
PULMONARY CONSULT NOTE      CAMILA ALLEN  MRN-[id number]      History of Present Illness:  Reason for Admission: Syncope; new onset Afib  History of Present Illness:   87 year old F with PMH of HTN, HLD, DM, recent URI presents after two episodes of loss of consciousness at home. Patient seen with grandchildren at bedside. State that the patient called out for help from the bathroom when it appeared she had lost consciousness on the toilet after a bowel movement. Denies head trauma, tongue bite or urinary incontinence but unclear if bowel incontinence or patient already had a bowel movement prior to episode of loss of consciousness. Patient's family then called EMS, and upon EMS arrival, granddaughter states that she once again lost consciousness and was told it was a "silent seizure" by EMS as she had constricted pupils and low blood pressure. Pt's granddaughter denies any urinary or bowel incontinence, jerky movements, eyes rolling back, or tongue bite. Patient reports headache and chills at this time, and denies dizziness, fever, n/v/d, lightheadedness, chest pain, shortness of breath, changes in urinary or bowel habits.     HISTORY OF PRESENT ILLNESS: As Above. Awake, alert, comfortable in bed in NAD    MEDICATIONS  (STANDING):  atorvastatin 40 milliGRAM(s) Oral at bedtime  enoxaparin Injectable 60 milliGRAM(s) SubCutaneous every 12 hours  insulin lispro (ADMELOG) corrective regimen sliding scale   SubCutaneous three times a day before meals  insulin lispro (ADMELOG) corrective regimen sliding scale   SubCutaneous at bedtime  magnesium sulfate  IVPB 1 Gram(s) IV Intermittent once  methylPREDNISolone sodium succinate Injectable 40 milliGRAM(s) IV Push every 12 hours  metoprolol tartrate 12.5 milliGRAM(s) Oral every 12 hours  oseltamivir 75 milliGRAM(s) Oral two times a day  sodium chloride 0.9%. 1000 milliLiter(s) (50 mL/Hr) IV Continuous <Continuous>  sodium phosphate 30 milliMole(s)/500 mL IVPB 30 milliMole(s) IV Intermittent once      MEDICATIONS  (PRN):  acetaminophen     Tablet .. 650 milliGRAM(s) Oral every 6 hours PRN Temp greater or equal to 38C (100.4F), Mild Pain (1 - 3)  albuterol    90 MICROgram(s) HFA Inhaler 2 Puff(s) Inhalation every 6 hours PRN Shortness of Breath and/or Wheezing  guaiFENesin Oral Liquid (Sugar-Free) 200 milliGRAM(s) Oral every 6 hours PRN Cough      Allergies    No Known Allergies    Intolerances        PAST MEDICAL & SURGICAL HISTORY:  Diabetes      History of hypertension      Hyperlipidemia      CAD (coronary artery disease)      No significant past surgical history          FAMILY HISTORY:  FH: type 1 diabetes    FH: hypertension    FH: hyperlipidemia    FH: lung cancer (Child)        SOCIAL HISTORY  Smoking History:     REVIEW OF SYSTEMS:    CONSTITUTIONAL:  No fevers, chills, sweats    HEENT:  Eyes:  No diplopia or blurred vision. ENT:  No earache, sore throat or runny nose.    CARDIOVASCULAR:  No pressure, squeezing, tightness, or heaviness about the chest; no palpitations.    RESPIRATORY:  Per HPI    GASTROINTESTINAL:  No abdominal pain, nausea, vomiting or diarrhea.    GENITOURINARY:  No dysuria, frequency or urgency.    NEUROLOGIC:  No paresthesias, fasciculations, seizures or weakness.    PSYCHIATRIC:  No disorder of thought or mood.    Vital Signs Last 24 Hrs  T(C): 36.7 (12 Nov 2022 05:06), Max: 37.1 (12 Nov 2022 00:13)  T(F): 98 (12 Nov 2022 05:06), Max: 98.7 (12 Nov 2022 00:13)  HR: 70 (12 Nov 2022 05:06) (69 - 80)  BP: 132/76 (12 Nov 2022 05:06) (123/74 - 133/80)  BP(mean): --  RR: 16 (12 Nov 2022 05:06) (16 - 16)  SpO2: 97% (12 Nov 2022 05:06) (94% - 97%)    Parameters below as of 12 Nov 2022 05:06  Patient On (Oxygen Delivery Method): room air      I&O's Detail      PHYSICAL EXAMINATION:    GENERAL: The patient is a well-developed, well-nourished _____in no apparent distress.     HEENT: Head is normocephalic and atraumatic. Extraocular muscles are intact. Mucous membranes are moist.     NECK: Supple.     LUNGS: B/L wheezing    HEART: Regular rate and rhythm without murmur.    ABDOMEN: Soft, nontender, and nondistended.  No hepatosplenomegaly is noted.    EXTREMITIES: Without any cyanosis, clubbing, rash, lesions or edema.    NEUROLOGIC: Grossly intact.      LABS:                        10.8   3.16  )-----------( 156      ( 12 Nov 2022 06:02 )             32.6     11-12    128<L>  |  96  |  15  ----------------------------<  175<H>  3.5   |  27  |  0.60    Ca    9.2      12 Nov 2022 06:02  Phos  2.1     11-12  Mg     1.4     11-12    TPro  6.7  /  Alb  3.0<L>  /  TBili  0.2  /  DBili  x   /  AST  15  /  ALT  28  /  AlkPhos  87  11-11    PT/INR - ( 11 Nov 2022 15:50 )   PT: 13.0 sec;   INR: 1.09 ratio         PTT - ( 11 Nov 2022 15:50 )  PTT:33.1 sec            Serum Pro-Brain Natriuretic Peptide: 352 pg/mL (11-11-22 @ 15:50)    Lactate, Blood: 4.0 mmol/L (11-12-22 @ 01:22)  Lactate, Blood: 3.6 mmol/L (11-11-22 @ 15:50)        MICROBIOLOGY:    RADIOLOGY & ADDITIONAL STUDIES:  < from: CT Head No Cont (11.11.22 @ 20:12) >    IMPRESSION:    No evidence of acute intracranial hemorrhage, midline shift or CT   evidence of acute territorial infarct.    If the patient's symptoms persist, consider short interval follow-up head   CT or brain MRI if there are no MRI contraindications.      < end of copied text >    CXR:  < from: Xray Chest 1 View- PORTABLE-Urgent (Xray Chest 1 View- PORTABLE-Urgent .) (11.11.22 @ 17:10) >  IMPRESSION:   No radiographic evidence of active chest disease.    < end of copied text >    Ct scan chest;    ekg;    echo:

## 2022-11-12 NOTE — PATIENT PROFILE ADULT - FALL HARM RISK - HARM RISK INTERVENTIONS
Assistance with ambulation/Assistance OOB with selected safe patient handling equipment/Communicate Risk of Fall with Harm to all staff/Monitor gait and stability/Reinforce activity limits and safety measures with patient and family/Review medications for side effects contributing to fall risk/Sit up slowly, dangle for a short time, stand at bedside before walking/Tailored Fall Risk Interventions/Toileting schedule using arm’s reach rule for commode and bathroom/Use of alarms - bed, chair and/or voice tab/Visual Cue: Yellow wristband and red socks/Bed in lowest position, wheels locked, appropriate side rails in place/Call bell, personal items and telephone in reach/Instruct patient to call for assistance before getting out of bed or chair/Non-slip footwear when patient is out of bed/[place] to call system/Physically safe environment - no spills, clutter or unnecessary equipment/Purposeful Proactive Rounding/Room/bathroom lighting operational, light cord in reach

## 2022-11-12 NOTE — CONSULT NOTE ADULT - SUBJECTIVE AND OBJECTIVE BOX
CHIEF COMPLAINT:Patient is a 87y old  Female who presents with a chief complaint of Syncope; new onset Afib .      HPI:  87 year old F with PMH of HTN, HLD, DM, recent URI presents after two episodes of loss of consciousness at home. Patient seen with grandchildren at bedside. State that the patient called out for help from the bathroom when it appeared she had lost consciousness on the toilet after a bowel movement. Denies head trauma, tongue bite or urinary incontinence but unclear if bowel incontinence or patient already had a bowel movement prior to episode of loss of consciousness. Patient's family then called EMS, and upon EMS arrival, granddaughter states that she once again lost consciousness and was told it was a "silent seizure" by EMS as she had constricted pupils and low blood pressure. Pt's granddaughter denies any urinary or bowel incontinence, jerky movements, eyes rolling back, or tongue bite. Patient reports headache and chills at this time, and denies dizziness, fever, n/v/d, lightheadedness, chest pain, shortness of breath, changes in urinary or bowel habits.     ED Course  Vitals /64 P 80 R 16 T 97.7F SpO2 96% RA  Meds: 1 L NS  EKG: A flutter @ 74 bpm (11 Nov 2022 17:55)      PAST MEDICAL & SURGICAL HISTORY:  Diabetes      History of hypertension      Hyperlipidemia      CAD (coronary artery disease)      MEDICATIONS  (STANDING):  atorvastatin 40 milliGRAM(s) Oral at bedtime  enoxaparin Injectable 60 milliGRAM(s) SubCutaneous every 12 hours  insulin lispro (ADMELOG) corrective regimen sliding scale   SubCutaneous three times a day before meals  insulin lispro (ADMELOG) corrective regimen sliding scale   SubCutaneous at bedtime  methylPREDNISolone sodium succinate Injectable 40 milliGRAM(s) IV Push every 12 hours  metoprolol tartrate 12.5 milliGRAM(s) Oral every 12 hours  oseltamivir 75 milliGRAM(s) Oral two times a day  sodium chloride 0.9%. 1000 milliLiter(s) (50 mL/Hr) IV Continuous <Continuous>  sodium phosphate 30 milliMole(s)/500 mL IVPB 30 milliMole(s) IV Intermittent once    MEDICATIONS  (PRN):  acetaminophen     Tablet .. 650 milliGRAM(s) Oral every 6 hours PRN Temp greater or equal to 38C (100.4F), Mild Pain (1 - 3)  albuterol    90 MICROgram(s) HFA Inhaler 2 Puff(s) Inhalation every 6 hours PRN Shortness of Breath and/or Wheezing  guaiFENesin Oral Liquid (Sugar-Free) 200 milliGRAM(s) Oral every 6 hours PRN Cough      FAMILY HISTORY:  FH: type 1 diabetes    FH: hypertension    FH: hyperlipidemia    FH: lung cancer (Child)        SOCIAL HISTORY:    [x ] Non-smoker    [ x] Alcohol-denies    Allergies    No Known Allergies    Intolerances    	    REVIEW OF SYSTEMS:  CONSTITUTIONAL: No fever, weight loss, or fatigue  EYES: No eye pain, visual disturbances, or discharge  ENT:  No difficulty hearing, tinnitus, vertigo; No sinus or throat pain  NECK: No pain or stiffness  RESPIRATORY: No cough, wheezing, chills or hemoptysis; No Shortness of Breath  CARDIOVASCULAR: No chest pain, palpitations,+ passing out,NO dizziness, or leg swelling  GASTROINTESTINAL: No abdominal or epigastric pain. No nausea, vomiting, or hematemesis; No diarrhea or constipation. No melena or hematochezia.  GENITOURINARY: No dysuria, frequency, hematuria, or incontinence  NEUROLOGICAL: No headaches, memory loss, loss of strength, numbness, or tremors  SKIN: No itching, burning, rashes, or lesions   LYMPH Nodes: No enlarged glands  ENDOCRINE: No heat or cold intolerance; No hair loss  MUSCULOSKELETAL: No joint pain or swelling; No muscle, back, or extremity pain  PSYCHIATRIC: No depression, anxiety, mood swings, or difficulty sleeping  HEME/LYMPH: No easy bruising, or bleeding gums  ALLERGY AND IMMUNOLOGIC: No hives or eczema	      PHYSICAL EXAM:  T(C): 36.4 (11-12-22 @ 10:35), Max: 37.1 (11-12-22 @ 00:13)  HR: 84 (11-12-22 @ 10:35) (69 - 84)  BP: 134/63 (11-12-22 @ 10:35) (123/74 - 134/63)  RR: 17 (11-12-22 @ 10:35) (16 - 17)  SpO2: 98% (11-12-22 @ 10:35) (94% - 98%)  Wt(kg): --  I&O's Summary      Appearance: Normal	  HEENT:   Normal oral mucosa, PERRL, EOMI	  Lymphatic: No lymphadenopathy  Cardiovascular: Normal S1 S2, No JVD, No murmurs, No edema  Respiratory: Lungs clear to auscultation	  Psychiatry: A & O x 3, Mood & affect appropriate  Gastrointestinal:  Soft, Non-tender, + BS	  Skin: No rashes, No ecchymoses, No cyanosis	  Neurologic: Non-focal  Extremities: Normal range of motion, No clubbing, cyanosis or edema  Vascular: Peripheral pulses palpable 2+ bilaterally      ECG:  	afib with nl axis    LABS:	 	    Troponin I, High Sensitivity Result: 7.4 ng/L (11-12-22 @ 01:22)  Troponin I, High Sensitivity Result: 8.3 ng/L (11-11-22 @ 15:50)                          10.8   3.16  )-----------( 156      ( 12 Nov 2022 06:02 )             32.6     11-12    128<L>  |  96  |  15  ----------------------------<  175<H>  3.5   |  27  |  0.60    Ca    9.2      12 Nov 2022 06:02  Phos  2.1     11-12  Mg     1.4     11-12    TPro  6.7  /  Alb  3.0<L>  /  TBili  0.2  /  DBili  x   /  AST  15  /  ALT  28  /  AlkPhos  87  11-11    proBNP: Serum Pro-Brain Natriuretic Peptide: 352 pg/mL (11-11 @ 15:50)    < from: CT Head No Cont (11.11.22 @ 20:12) >  ACC: [id number] EXAM:  CT BRAIN                          PROCEDURE DATE:  11/11/2022          INTERPRETATION:  CLINICAL INDICATION:  syncope with afib r/o cva    TECHNIQUE: Noncontrast CT examination of the head was performed using   contiguous 5 mm CT images.    COMPARISON: There are no prior studies available for comparison.    FINDINGS:    There is no evidence of mass or acute intracranial hemorrhage. Ventricles   and sulci are normal in size and configuration for the patient's stated   age. No midline shift or other significant mass effect is noted. There is   no CT evidence of acute territorial infarct. There are periventricular   white matter hypodensities that are nonspecific in nature but may reflect   chronic ischemic microvascular disease.    The visualized paranasal sinuses and tympanomastoid spaces are clear.   Orbits and orbital contents are unremarkable.    There is no depressed calvarial fracture.        IMPRESSION:    No evidence of acute intracranial hemorrhage, midline shift or CT   evidence of acute territorial infarct.    If the patient's symptoms persist, consider short interval follow-up head   CT or brain MRI if there are no MRI contraindications.    --- End of Report ---    < end of copied text >  < from: Xray Chest 1 View- PORTABLE-Urgent (Xray Chest 1 View- PORTABLE-Urgent .) (11.11.22 @ 17:10) >  ACC: [id number] EXAM:  XR CHEST PORTABLE URGENT 1V                          PROCEDURE DATE:  11/11/2022          INTERPRETATION:  Portable chest radiograph    CLINICAL INFORMATION: Syncope    TECHNIQUE:  Portable  AP chest radiograph.    COMPARISON: 11/6/2022 chest .    FINDINGS:  CATHETERS AND TUBES: None    PULMONARY: The visualized lungs are clear of airspace consolidations or   pleural effusions.   No pneumothorax.    HEART/VASCULAR: The heart and mediastinum size and configuration are   within normal limits.    BONES: Visualized osseous structures are intact.    IMPRESSION:   No radiographic evidence of active chest disease.    < end of copied text >

## 2022-11-13 DIAGNOSIS — I27.20 PULMONARY HYPERTENSION, UNSPECIFIED: ICD-10-CM

## 2022-11-13 DIAGNOSIS — I48.91 UNSPECIFIED ATRIAL FIBRILLATION: ICD-10-CM

## 2022-11-13 LAB
ANION GAP SERPL CALC-SCNC: 8 MMOL/L — SIGNIFICANT CHANGE UP (ref 5–17)
BUN SERPL-MCNC: 17 MG/DL — SIGNIFICANT CHANGE UP (ref 7–18)
CALCIUM SERPL-MCNC: 9.5 MG/DL — SIGNIFICANT CHANGE UP (ref 8.4–10.5)
CHLORIDE SERPL-SCNC: 101 MMOL/L — SIGNIFICANT CHANGE UP (ref 96–108)
CO2 SERPL-SCNC: 24 MMOL/L — SIGNIFICANT CHANGE UP (ref 22–31)
CREAT SERPL-MCNC: 0.85 MG/DL — SIGNIFICANT CHANGE UP (ref 0.5–1.3)
EGFR: 66 ML/MIN/1.73M2 — SIGNIFICANT CHANGE UP
GLUCOSE BLDC GLUCOMTR-MCNC: 215 MG/DL — HIGH (ref 70–99)
GLUCOSE BLDC GLUCOMTR-MCNC: 255 MG/DL — HIGH (ref 70–99)
GLUCOSE BLDC GLUCOMTR-MCNC: 263 MG/DL — HIGH (ref 70–99)
GLUCOSE BLDC GLUCOMTR-MCNC: 274 MG/DL — HIGH (ref 70–99)
GLUCOSE SERPL-MCNC: 244 MG/DL — HIGH (ref 70–99)
HCT VFR BLD CALC: 37.7 % — SIGNIFICANT CHANGE UP (ref 34.5–45)
HGB BLD-MCNC: 12.2 G/DL — SIGNIFICANT CHANGE UP (ref 11.5–15.5)
MAGNESIUM SERPL-MCNC: 1.9 MG/DL — SIGNIFICANT CHANGE UP (ref 1.6–2.6)
MCHC RBC-ENTMCNC: 26.1 PG — LOW (ref 27–34)
MCHC RBC-ENTMCNC: 32.4 GM/DL — SIGNIFICANT CHANGE UP (ref 32–36)
MCV RBC AUTO: 80.7 FL — SIGNIFICANT CHANGE UP (ref 80–100)
NRBC # BLD: 0 /100 WBCS — SIGNIFICANT CHANGE UP (ref 0–0)
PHOSPHATE SERPL-MCNC: 2.9 MG/DL — SIGNIFICANT CHANGE UP (ref 2.5–4.5)
PLATELET # BLD AUTO: 176 K/UL — SIGNIFICANT CHANGE UP (ref 150–400)
POTASSIUM SERPL-MCNC: 4.6 MMOL/L — SIGNIFICANT CHANGE UP (ref 3.5–5.3)
POTASSIUM SERPL-SCNC: 4.6 MMOL/L — SIGNIFICANT CHANGE UP (ref 3.5–5.3)
RBC # BLD: 4.67 M/UL — SIGNIFICANT CHANGE UP (ref 3.8–5.2)
RBC # FLD: 14.2 % — SIGNIFICANT CHANGE UP (ref 10.3–14.5)
SODIUM SERPL-SCNC: 133 MMOL/L — LOW (ref 135–145)
T4 FREE SERPL-MCNC: 2 NG/DL — HIGH (ref 0.9–1.8)
TSH SERPL-MCNC: 0.24 UU/ML — LOW (ref 0.34–4.82)
WBC # BLD: 7.9 K/UL — SIGNIFICANT CHANGE UP (ref 3.8–10.5)
WBC # FLD AUTO: 7.9 K/UL — SIGNIFICANT CHANGE UP (ref 3.8–10.5)

## 2022-11-13 RX ORDER — SODIUM CHLORIDE 9 MG/ML
1000 INJECTION INTRAMUSCULAR; INTRAVENOUS; SUBCUTANEOUS
Refills: 0 | Status: DISCONTINUED | OUTPATIENT
Start: 2022-11-13 | End: 2022-11-17

## 2022-11-13 RX ORDER — MAGNESIUM SULFATE 500 MG/ML
2 VIAL (ML) INJECTION ONCE
Refills: 0 | Status: COMPLETED | OUTPATIENT
Start: 2022-11-13 | End: 2022-11-13

## 2022-11-13 RX ORDER — POTASSIUM PHOSPHATE, MONOBASIC POTASSIUM PHOSPHATE, DIBASIC 236; 224 MG/ML; MG/ML
15 INJECTION, SOLUTION INTRAVENOUS ONCE
Refills: 0 | Status: COMPLETED | OUTPATIENT
Start: 2022-11-13 | End: 2022-11-13

## 2022-11-13 RX ADMIN — Medication 40 MILLIGRAM(S): at 05:00

## 2022-11-13 RX ADMIN — Medication 75 MILLIGRAM(S): at 18:04

## 2022-11-13 RX ADMIN — ENOXAPARIN SODIUM 60 MILLIGRAM(S): 100 INJECTION SUBCUTANEOUS at 18:03

## 2022-11-13 RX ADMIN — Medication 650 MILLIGRAM(S): at 08:51

## 2022-11-13 RX ADMIN — Medication 12.5 MILLIGRAM(S): at 05:01

## 2022-11-13 RX ADMIN — Medication 3: at 17:54

## 2022-11-13 RX ADMIN — SODIUM CHLORIDE 75 MILLILITER(S): 9 INJECTION INTRAMUSCULAR; INTRAVENOUS; SUBCUTANEOUS at 20:46

## 2022-11-13 RX ADMIN — ATORVASTATIN CALCIUM 40 MILLIGRAM(S): 80 TABLET, FILM COATED ORAL at 21:09

## 2022-11-13 RX ADMIN — POTASSIUM PHOSPHATE, MONOBASIC POTASSIUM PHOSPHATE, DIBASIC 62.5 MILLIMOLE(S): 236; 224 INJECTION, SOLUTION INTRAVENOUS at 08:51

## 2022-11-13 RX ADMIN — Medication 25 GRAM(S): at 08:51

## 2022-11-13 RX ADMIN — Medication 2: at 08:49

## 2022-11-13 RX ADMIN — Medication 200 MILLIGRAM(S): at 21:09

## 2022-11-13 RX ADMIN — Medication 40 MILLIGRAM(S): at 18:03

## 2022-11-13 RX ADMIN — PANTOPRAZOLE SODIUM 40 MILLIGRAM(S): 20 TABLET, DELAYED RELEASE ORAL at 06:13

## 2022-11-13 RX ADMIN — Medication 1: at 22:09

## 2022-11-13 RX ADMIN — Medication 3: at 12:13

## 2022-11-13 RX ADMIN — Medication 650 MILLIGRAM(S): at 09:36

## 2022-11-13 RX ADMIN — Medication 75 MILLIGRAM(S): at 05:01

## 2022-11-13 RX ADMIN — ENOXAPARIN SODIUM 60 MILLIGRAM(S): 100 INJECTION SUBCUTANEOUS at 05:01

## 2022-11-13 RX ADMIN — Medication 12.5 MILLIGRAM(S): at 18:05

## 2022-11-13 NOTE — PROGRESS NOTE ADULT - PROBLEM SELECTOR PLAN 5
Accuchecks with reg insulin coverage  HBA1C. analgesics prn  gentle hydration  Tamiflu 75 mgs po BID for 5 days.

## 2022-11-13 NOTE — PROGRESS NOTE ADULT - SUBJECTIVE AND OBJECTIVE BOX
Patient is a 87y old  Female who presents with a chief complaint of Syncope; new onset Afib (12 Nov 2022 11:13)    pt seen in tele [ x ], reg med floor [  ], bed [x  ], chair at bedside [   ], a+o x3 [ x ], lethargic [  ],    nad [ x ]        Allergies    No Known Allergies        Vitals    T(F): 97.6 (11-13-22 @ 04:55), Max: 98.4 (11-12-22 @ 17:31)  HR: 76 (11-13-22 @ 04:55) (65 - 86)  BP: 130/79 (11-13-22 @ 04:55) (123/71 - 138/79)  RR: 17 (11-13-22 @ 04:55) (16 - 17)  SpO2: 96% (11-13-22 @ 04:55) (96% - 98%)  Wt(kg): --  CAPILLARY BLOOD GLUCOSE      POCT Blood Glucose.: 158 mg/dL (12 Nov 2022 20:37)      Labs                          10.8   3.16  )-----------( 156      ( 12 Nov 2022 06:02 )             32.6       11-12    128<L>  |  96  |  15  ----------------------------<  175<H>  3.5   |  27  |  0.60    Ca    9.2      12 Nov 2022 06:02  Phos  2.1     11-12  Mg     1.4     11-12    TPro  6.7  /  Alb  3.0<L>  /  TBili  0.2  /  DBili  x   /  AST  15  /  ALT  28  /  AlkPhos  87  11-11          Troponin I, High Sensitivity Result: 7.4 ng/L (11-12-22 @ 01:22)  Troponin I, High Sensitivity Result: 8.3 ng/L (11-11-22 @ 15:50)        Radiology Results      Meds    MEDICATIONS  (STANDING):  atorvastatin 40 milliGRAM(s) Oral at bedtime  enoxaparin Injectable 60 milliGRAM(s) SubCutaneous every 12 hours  insulin lispro (ADMELOG) corrective regimen sliding scale   SubCutaneous three times a day before meals  insulin lispro (ADMELOG) corrective regimen sliding scale   SubCutaneous at bedtime  methylPREDNISolone sodium succinate Injectable 40 milliGRAM(s) IV Push every 12 hours  metoprolol tartrate 12.5 milliGRAM(s) Oral every 12 hours  oseltamivir 75 milliGRAM(s) Oral two times a day  pantoprazole    Tablet 40 milliGRAM(s) Oral before breakfast  sodium chloride 0.9%. 1000 milliLiter(s) (50 mL/Hr) IV Continuous <Continuous>      MEDICATIONS  (PRN):  acetaminophen     Tablet .. 650 milliGRAM(s) Oral every 6 hours PRN Temp greater or equal to 38C (100.4F), Mild Pain (1 - 3)  albuterol    90 MICROgram(s) HFA Inhaler 2 Puff(s) Inhalation every 6 hours PRN Shortness of Breath and/or Wheezing  guaiFENesin Oral Liquid (Sugar-Free) 200 milliGRAM(s) Oral every 6 hours PRN Cough      Physical Exam      Neuro :  no focal deficits  Respiratory: B/L wheeze  CV: RRR, S1S2, no murmurs,   Abdominal: Soft, NT, ND +BS,  Extremities: No edema, + peripheral pulses      ASSESSMENT    syncope,   r/o cns patho,   afib,   flu a,   hyponatremia,  hypophosphatemia  hypomagnesemia  h/o HTN,   HLD,  DM,  CAD (coronary artery disease)      PLAN     cont tele,   aspirin, statin,   neuro cons  ct head with No evidence of acute intracranial hemorrhage, midline shift or CT evidence of acute territorial infarct noted above.  trop x2 neg noted above   cardio cons  cont lopressor,  lovenoc 1mg/kg q 12  hold xarelto   cont tamiflu,   pulm cons   start solumedrol 40 mg q12  robitussin dm prn cough   albuterol prn  sup mg, phos and sodium   renal cons   f/u hgba1c,   lispro ss   cont current meds          Patient is a 87y old  Female who presents with a chief complaint of Syncope; new onset Afib (12 Nov 2022 11:13)    pt seen in tele [ x ], reg med floor [  ], bed [x  ], chair at bedside [   ], a+o x3 [ x ], lethargic [  ],    nad [ x ]        Allergies    No Known Allergies        Vitals    T(F): 97.6 (11-13-22 @ 04:55), Max: 98.4 (11-12-22 @ 17:31)  HR: 76 (11-13-22 @ 04:55) (65 - 86)  BP: 130/79 (11-13-22 @ 04:55) (123/71 - 138/79)  RR: 17 (11-13-22 @ 04:55) (16 - 17)  SpO2: 96% (11-13-22 @ 04:55) (96% - 98%)  Wt(kg): --  CAPILLARY BLOOD GLUCOSE      POCT Blood Glucose.: 158 mg/dL (12 Nov 2022 20:37)      Labs                          10.8   3.16  )-----------( 156      ( 12 Nov 2022 06:02 )             32.6       11-12    128<L>  |  96  |  15  ----------------------------<  175<H>  3.5   |  27  |  0.60    Ca    9.2      12 Nov 2022 06:02  Phos  2.1     11-12  Mg     1.4     11-12    TPro  6.7  /  Alb  3.0<L>  /  TBili  0.2  /  DBili  x   /  AST  15  /  ALT  28  /  AlkPhos  87  11-11      A1C with Estimated Average Glucose (11.12.22 @ 06:02)   A1C with Estimated Average Glucose Result: 7.6:    Troponin I, High Sensitivity Result: 7.4 ng/L (11-12-22 @ 01:22)  Troponin I, High Sensitivity Result: 8.3 ng/L (11-11-22 @ 15:50)        Radiology Results      Meds    MEDICATIONS  (STANDING):  atorvastatin 40 milliGRAM(s) Oral at bedtime  enoxaparin Injectable 60 milliGRAM(s) SubCutaneous every 12 hours  insulin lispro (ADMELOG) corrective regimen sliding scale   SubCutaneous three times a day before meals  insulin lispro (ADMELOG) corrective regimen sliding scale   SubCutaneous at bedtime  methylPREDNISolone sodium succinate Injectable 40 milliGRAM(s) IV Push every 12 hours  metoprolol tartrate 12.5 milliGRAM(s) Oral every 12 hours  oseltamivir 75 milliGRAM(s) Oral two times a day  pantoprazole    Tablet 40 milliGRAM(s) Oral before breakfast  sodium chloride 0.9%. 1000 milliLiter(s) (50 mL/Hr) IV Continuous <Continuous>      MEDICATIONS  (PRN):  acetaminophen     Tablet .. 650 milliGRAM(s) Oral every 6 hours PRN Temp greater or equal to 38C (100.4F), Mild Pain (1 - 3)  albuterol    90 MICROgram(s) HFA Inhaler 2 Puff(s) Inhalation every 6 hours PRN Shortness of Breath and/or Wheezing  guaiFENesin Oral Liquid (Sugar-Free) 200 milliGRAM(s) Oral every 6 hours PRN Cough      Physical Exam      Neuro :  no focal deficits  Respiratory: B/L wheeze  CV: RRR, S1S2, no murmurs,   Abdominal: Soft, NT, ND +BS,  Extremities: No edema, + peripheral pulses      ASSESSMENT    syncope likely cardiogenic   new afib,   flu a,   hyponatremia,  hypophosphatemia  hypomagnesemia  h/o HTN,   HLD,  DM,  CAD (coronary artery disease)      PLAN     cont tele,   aspirin, statin,   ct head with No evidence of acute intracranial hemorrhage, midline shift or CT evidence of acute territorial infarct noted    trop x2 neg noted     cardio f/u `  cont lopressor,  lovenox 1mg/kg q 12  hold xarelto   f/u tsh and ft4  cont tamiflu,   pulm f/u   start solumedrol 40 mg q12  robitussin dm prn cough   albuterol prn  pft outpt  sup mg, phos and sodium as needed  renal cons   hgba1c 7.6 noted above,   lispro ss   cont current meds

## 2022-11-13 NOTE — PROGRESS NOTE ADULT - SUBJECTIVE AND OBJECTIVE BOX
CHIEF COMPLAINT:Patient is a 87y old  Female who presents with a chief complaint of Syncope; new onset Afib.Pt appears comfortable.    	  REVIEW OF SYSTEMS:  CONSTITUTIONAL: No fever, weight loss, or fatigue  EYES: No eye pain, visual disturbances, or discharge  ENT:  No difficulty hearing, tinnitus, vertigo; No sinus or throat pain  NECK: No pain or stiffness  RESPIRATORY: No cough, wheezing, chills or hemoptysis; No Shortness of Breath  CARDIOVASCULAR: No chest pain, palpitations, passing out, dizziness, or leg swelling  GASTROINTESTINAL: No abdominal or epigastric pain. No nausea, vomiting, or hematemesis; No diarrhea or constipation. No melena or hematochezia.  GENITOURINARY: No dysuria, frequency, hematuria, or incontinence  NEUROLOGICAL: No headaches, memory loss, loss of strength, numbness, or tremors  SKIN: No itching, burning, rashes, or lesions   LYMPH Nodes: No enlarged glands  ENDOCRINE: No heat or cold intolerance; No hair loss  MUSCULOSKELETAL: No joint pain or swelling; No muscle, back, or extremity pain  PSYCHIATRIC: No depression, anxiety, mood swings, or difficulty sleeping  HEME/LYMPH: No easy bruising, or bleeding gums  ALLERGY AND IMMUNOLOGIC: No hives or eczema	        PHYSICAL EXAM:  T(C): 36.6 (11-13-22 @ 10:00), Max: 36.9 (11-12-22 @ 17:31)  HR: 74 (11-13-22 @ 10:00) (65 - 86)  BP: 128/78 (11-13-22 @ 10:00) (123/71 - 138/79)  RR: 20 (11-13-22 @ 10:00) (16 - 20)  SpO2: 97% (11-13-22 @ 10:00) (96% - 98%)      Appearance: Normal	  HEENT:   Normal oral mucosa, PERRL, EOMI	  Lymphatic: No lymphadenopathy  Cardiovascular: Normal S1 S2, No JVD, No murmurs, No edema  Respiratory: Lungs clear to auscultation	  Psychiatry: A & O x 3, Mood & affect appropriate  Gastrointestinal:  Soft, Non-tender, + BS	  Skin: No rashes, No ecchymoses, No cyanosis	  Neurologic: Non-focal  Extremities: Normal range of motion, No clubbing, cyanosis or edema  Vascular: Peripheral pulses palpable 2+ bilaterally    MEDICATIONS  (STANDING):  atorvastatin 40 milliGRAM(s) Oral at bedtime  enoxaparin Injectable 60 milliGRAM(s) SubCutaneous every 12 hours  insulin lispro (ADMELOG) corrective regimen sliding scale   SubCutaneous three times a day before meals  insulin lispro (ADMELOG) corrective regimen sliding scale   SubCutaneous at bedtime  methylPREDNISolone sodium succinate Injectable 40 milliGRAM(s) IV Push every 12 hours  metoprolol tartrate 12.5 milliGRAM(s) Oral every 12 hours  oseltamivir 75 milliGRAM(s) Oral two times a day  pantoprazole    Tablet 40 milliGRAM(s) Oral before breakfast  sodium chloride 0.9%. 1000 milliLiter(s) (75 mL/Hr) IV Continuous <Continuous>      TELEMETRY: 	afib 40-80's    	  	  LABS:	 	    Troponin I, High Sensitivity Result: 7.4 ng/L (11-12 @ 01:22)  Troponin I, High Sensitivity Result: 8.3 ng/L (11-11 @ 15:50)                            12.2   7.90  )-----------( 176      ( 13 Nov 2022 07:00 )             37.7     11-13    133<L>  |  101  |  17  ----------------------------<  244<H>  4.6   |  24  |  0.85    Ca    9.5      13 Nov 2022 07:00  Phos  2.9     11-13  Mg     1.9     11-13    TPro  6.7  /  Alb  3.0<L>  /  TBili  0.2  /  DBili  x   /  AST  15  /  ALT  28  /  AlkPhos  87  11-11    proBNP: Serum Pro-Brain Natriuretic Peptide: 352 pg/mL (11-11 @ 15:50)      TSH: Thyroid Stimulating Hormone, Serum: 0.24 uU/mL (11-13 @ 07:00)      	    < from: Transthoracic Echocardiogram (11.12.22 @ 06:51) >  OBSERVATIONS:  Mitral Valve: Normal mitral valve. Moderate mitral  regurgitation.  Aortic Root: Aortic Root: 3.2 cm.    Aortic Valve: Normal trileaflet aortic valve. Mild aortic  regurgitation.  Left Atrium: Severely dilated left atrium.  LA volume index  = 54 cc/m2.  Left Ventricle: Normal Left Ventricular Systolic Function,  (EF = 55 to 60%) Normal left ventricular internal  dimensions and wall thicknesses. Unable to adequately  assess diastolic function due to technical aspects of this  study.  Right Heart: Normal right atrium. Normal right ventricular  size and systolic function (TAPSE  1.9cm). There is mild  tricuspid regurgitation. There is trace pulmonic  regurgitation.  Pericardium/PleuraNormal pericardium with no pericardial  effusion.  Hemodynamic: RV systolic pressure is mildly increased at  41 mm Hg.    < end of copied text >

## 2022-11-13 NOTE — PROGRESS NOTE ADULT - SUBJECTIVE AND OBJECTIVE BOX
Patient is a 87y old  Female who presents with a chief complaint of Syncope; new onset Afib (13 Nov 2022 06:39)    Patient is awake, alert, laying comfortably in the bed in no acute distress at room air.    INTERVAL HPI/OVERNIGHT EVENTS:      VITAL SIGNS:  T(F): 97.6 (11-13-22 @ 04:55)  HR: 76 (11-13-22 @ 04:55)  BP: 130/79 (11-13-22 @ 04:55)  RR: 17 (11-13-22 @ 04:55)  SpO2: 96% (11-13-22 @ 04:55)  Wt(kg): --  I&O's Detail          REVIEW OF SYSTEMS:    CONSTITUTIONAL:  No fevers, chills, sweats    HEENT:  Eyes:  No diplopia or blurred vision. ENT:  No earache, sore throat or runny nose.    CARDIOVASCULAR:  No pressure, squeezing, tightness, or heaviness about the chest; no palpitations.    RESPIRATORY:  Per HPI    GASTROINTESTINAL:  No abdominal pain, nausea, vomiting or diarrhea.    GENITOURINARY:  No dysuria, frequency or urgency.    NEUROLOGIC:  No paresthesias, fasciculations, seizures or weakness.    PSYCHIATRIC:  No disorder of thought or mood.      PHYSICAL EXAM:    Constitutional: Well developed and nourished  Eyes:Perrla  ENMT: normal  Neck:supple  Respiratory: good air entry  Cardiovascular: S1 S2 regular  Gastrointestinal: Soft, Non tender  Extremities: No edema  Vascular:normal  Neurological:Awake, alert,Ox3  Musculoskeletal:Normal      MEDICATIONS  (STANDING):  atorvastatin 40 milliGRAM(s) Oral at bedtime  enoxaparin Injectable 60 milliGRAM(s) SubCutaneous every 12 hours  insulin lispro (ADMELOG) corrective regimen sliding scale   SubCutaneous three times a day before meals  insulin lispro (ADMELOG) corrective regimen sliding scale   SubCutaneous at bedtime  methylPREDNISolone sodium succinate Injectable 40 milliGRAM(s) IV Push every 12 hours  metoprolol tartrate 12.5 milliGRAM(s) Oral every 12 hours  oseltamivir 75 milliGRAM(s) Oral two times a day  pantoprazole    Tablet 40 milliGRAM(s) Oral before breakfast  sodium chloride 0.9%. 1000 milliLiter(s) (75 mL/Hr) IV Continuous <Continuous>    MEDICATIONS  (PRN):  acetaminophen     Tablet .. 650 milliGRAM(s) Oral every 6 hours PRN Temp greater or equal to 38C (100.4F), Mild Pain (1 - 3)  albuterol    90 MICROgram(s) HFA Inhaler 2 Puff(s) Inhalation every 6 hours PRN Shortness of Breath and/or Wheezing  guaiFENesin Oral Liquid (Sugar-Free) 200 milliGRAM(s) Oral every 6 hours PRN Cough      Allergies    No Known Allergies    Intolerances        LABS:                        12.2   7.90  )-----------( 176      ( 13 Nov 2022 07:00 )             37.7     11-13    133<L>  |  101  |  17  ----------------------------<  244<H>  4.6   |  24  |  0.85    Ca    9.5      13 Nov 2022 07:00  Phos  2.9     11-13  Mg     1.9     11-13    TPro  6.7  /  Alb  3.0<L>  /  TBili  0.2  /  DBili  x   /  AST  15  /  ALT  28  /  AlkPhos  87  11-11    PT/INR - ( 11 Nov 2022 15:50 )   PT: 13.0 sec;   INR: 1.09 ratio         PTT - ( 11 Nov 2022 15:50 )  PTT:33.1 sec          CAPILLARY BLOOD GLUCOSE      POCT Blood Glucose.: 215 mg/dL (13 Nov 2022 07:52)  POCT Blood Glucose.: 158 mg/dL (12 Nov 2022 20:37)  POCT Blood Glucose.: 278 mg/dL (12 Nov 2022 17:10)  POCT Blood Glucose.: 194 mg/dL (12 Nov 2022 11:27)    pro-bnp 352 11-11 @ 15:50     d-dimer --  11-11 @ 15:50      RADIOLOGY & ADDITIONAL TESTS:    CXR:< from: CT Head No Cont (11.11.22 @ 20:12) >  IMPRESSION:    No evidence of acute intracranial hemorrhage, midline shift or CT   evidence of acute territorial infarct.    If the patient's symptoms persist, consider short interval follow-up head   CT or brain MRI if there are no MRI contraindications.    --- End of Report ---    < end of copied text >  < from: Xray Chest 1 View- PORTABLE-Urgent (Xray Chest 1 View- PORTABLE-Urgent .) (11.11.22 @ 17:10) >  IMPRESSION:   No radiographic evidence of active chest disease.    --- End of Report ---    < end of copied text >      < from: 12 Lead ECG (11.11.22 @ 16:34) >  Diagnosis Line Atrial fibrillation  Abnormal ECG      < end of copied text >      < from: Transthoracic Echocardiogram (11.12.22 @ 06:51) >  CONCLUSIONS:  1. Normal mitral valve. Moderate mitral regurgitation.  2. Normal trileaflet aortic valve. Mild aortic  regurgitation.  3. Aortic Root: 3.2 cm.  4. Severely dilated left atrium.  LA volume index = 54  cc/m2.  5. Normal Left Ventricular Systolic Function,  (EF = 55 to  60%)  6. Unable to adequately assess diastolic function due to  technical aspects of this study.  7. Normal right atrium.  8. Normal right ventricular size and systolic function  (TAPSE  1.9cm).  9. RV systolic pressure is mildly increased at  41 mm Hg.  10. There is mild tricuspid regurgitation.  11. There is trace pulmonic regurgitation.  12. Normal pericardium with no pericardial effusion.      < end of copied text >      Ct scan chest:    ekg;    echo:

## 2022-11-13 NOTE — PROGRESS NOTE ADULT - PROBLEM SELECTOR PLAN 8
patient takes atorvastatin 40 mg qhs and ezetimibe 10 mg qd per levi  Unable to perform MEDREC as pharmacy is closed - [facility] [phone]    - c/w atorvastatin 40 mg qhs for now until Medrec is complete - patient takes atorvastatin 40 mg qhs and ezetimibe 10 mg qd per surescripts  - c/w atorvastatin 40 mg qhs for now until Medrec is complete

## 2022-11-13 NOTE — PROGRESS NOTE ADULT - SUBJECTIVE AND OBJECTIVE BOX
PGY-1 Progress Note discussed with attending      PLEASE CONTACT ON CALL TEAM:  - On Call Team (Please refer to Nelly) FROM 5:00 PM - 8:30PM  - Nightfloat Team FROM 8:30 -7:30 AM    CHIEF COMPLAINT & BRIEF HOSPITAL COURSE:      INTERVAL HPI/OVERNIGHT EVENTS:       REVIEW OF SYSTEMS:  CONSTITUTIONAL: No fever, weight loss, or fatigue  RESPIRATORY: No cough, wheezing, chills or hemoptysis; No shortness of breath  CARDIOVASCULAR: No chest pain, palpitations, dizziness, or leg swelling  GASTROINTESTINAL: No abdominal pain. No nausea, vomiting, or hematemesis; No diarrhea or constipation. No melena or hematochezia.  GENITOURINARY: No dysuria or hematuria, urinary frequency  NEUROLOGICAL: No headaches, memory loss, loss of strength, numbness, or tremors  SKIN: No itching, burning, rashes, or lesions     MEDICATIONS  (STANDING):  atorvastatin 40 milliGRAM(s) Oral at bedtime  enoxaparin Injectable 60 milliGRAM(s) SubCutaneous every 12 hours  insulin lispro (ADMELOG) corrective regimen sliding scale   SubCutaneous three times a day before meals  insulin lispro (ADMELOG) corrective regimen sliding scale   SubCutaneous at bedtime  methylPREDNISolone sodium succinate Injectable 40 milliGRAM(s) IV Push every 12 hours  metoprolol tartrate 12.5 milliGRAM(s) Oral every 12 hours  oseltamivir 75 milliGRAM(s) Oral two times a day  pantoprazole    Tablet 40 milliGRAM(s) Oral before breakfast  sodium chloride 0.9%. 1000 milliLiter(s) (50 mL/Hr) IV Continuous <Continuous>    MEDICATIONS  (PRN):  acetaminophen     Tablet .. 650 milliGRAM(s) Oral every 6 hours PRN Temp greater or equal to 38C (100.4F), Mild Pain (1 - 3)  albuterol    90 MICROgram(s) HFA Inhaler 2 Puff(s) Inhalation every 6 hours PRN Shortness of Breath and/or Wheezing  guaiFENesin Oral Liquid (Sugar-Free) 200 milliGRAM(s) Oral every 6 hours PRN Cough      Vital Signs Last 24 Hrs  T(C): 36.4 (13 Nov 2022 04:55), Max: 36.9 (12 Nov 2022 17:31)  T(F): 97.6 (13 Nov 2022 04:55), Max: 98.4 (12 Nov 2022 17:31)  HR: 76 (13 Nov 2022 04:55) (65 - 86)  BP: 130/79 (13 Nov 2022 04:55) (123/71 - 138/79)  BP(mean): --  RR: 17 (13 Nov 2022 04:55) (16 - 17)  SpO2: 96% (13 Nov 2022 04:55) (96% - 98%)    Parameters below as of 13 Nov 2022 04:55  Patient On (Oxygen Delivery Method): room air        PHYSICAL EXAMINATION:  GENERAL: NAD, well built  HEAD:  Atraumatic, Normocephalic  EYES:  conjunctiva and sclera clear  NECK: Supple, No JVD, Normal thyroid  CHEST/LUNG: Clear to auscultation. Clear to percussion bilaterally; No rales, rhonchi, wheezing, or rubs  HEART: Regular rate and rhythm; No murmurs, rubs, or gallops  ABDOMEN: Soft, Nontender, Nondistended; Bowel sounds present, no pain or masses on palpation  NERVOUS SYSTEM:  Alert & Oriented X3  : voiding well  EXTREMITIES:  2+ Peripheral Pulses, No clubbing, cyanosis, or edema  SKIN: warm dry                          10.8   3.16  )-----------( 156      ( 12 Nov 2022 06:02 )             32.6     11-12    128<L>  |  96  |  15  ----------------------------<  175<H>  3.5   |  27  |  0.60    Ca    9.2      12 Nov 2022 06:02  Phos  2.1     11-12  Mg     1.4     11-12    TPro  6.7  /  Alb  3.0<L>  /  TBili  0.2  /  DBili  x   /  AST  15  /  ALT  28  /  AlkPhos  87  11-11    LIVER FUNCTIONS - ( 11 Nov 2022 15:50 )  Alb: 3.0 g/dL / Pro: 6.7 g/dL / ALK PHOS: 87 U/L / ALT: 28 U/L DA / AST: 15 U/L / GGT: x               PT/INR - ( 11 Nov 2022 15:50 )   PT: 13.0 sec;   INR: 1.09 ratio         PTT - ( 11 Nov 2022 15:50 )  PTT:33.1 sec    I&O's Summary          CAPILLARY BLOOD GLUCOSE      RADIOLOGY & ADDITIONAL TESTS:                   PGY-1 Progress Note discussed with attending      PLEASE CONTACT ON CALL TEAM:  - On Call Team (Please refer to Nelly) FROM 5:00 PM - 8:30PM  - Nightfloat Team FROM 8:30 -7:30 AM    INTERVAL HPI/OVERNIGHT EVENTS: No acute events overnight.  Patient examined at bedside in the AM with her granddaughter present.  Patient denies any further episodes of syncope, Nausea vomiting, chest pain, shortness of breath, cough, abdominal pain.      REVIEW OF SYSTEMS:  CONSTITUTIONAL: No fever, weight loss, or fatigue  RESPIRATORY: No cough, wheezing, chills or hemoptysis; No shortness of breath  CARDIOVASCULAR: No chest pain, palpitations, dizziness, or leg swelling  GASTROINTESTINAL: No abdominal pain. No nausea, vomiting, or hematemesis; No diarrhea or constipation. No melena or hematochezia.  GENITOURINARY: No dysuria or hematuria, urinary frequency  NEUROLOGICAL: No headaches, memory loss, loss of strength, numbness, or tremors  SKIN: No itching, burning, rashes, or lesions     MEDICATIONS  (STANDING):  atorvastatin 40 milliGRAM(s) Oral at bedtime  enoxaparin Injectable 60 milliGRAM(s) SubCutaneous every 12 hours  insulin lispro (ADMELOG) corrective regimen sliding scale   SubCutaneous three times a day before meals  insulin lispro (ADMELOG) corrective regimen sliding scale   SubCutaneous at bedtime  methylPREDNISolone sodium succinate Injectable 40 milliGRAM(s) IV Push every 12 hours  metoprolol tartrate 12.5 milliGRAM(s) Oral every 12 hours  oseltamivir 75 milliGRAM(s) Oral two times a day  pantoprazole    Tablet 40 milliGRAM(s) Oral before breakfast  sodium chloride 0.9%. 1000 milliLiter(s) (50 mL/Hr) IV Continuous <Continuous>    MEDICATIONS  (PRN):  acetaminophen     Tablet .. 650 milliGRAM(s) Oral every 6 hours PRN Temp greater or equal to 38C (100.4F), Mild Pain (1 - 3)  albuterol    90 MICROgram(s) HFA Inhaler 2 Puff(s) Inhalation every 6 hours PRN Shortness of Breath and/or Wheezing  guaiFENesin Oral Liquid (Sugar-Free) 200 milliGRAM(s) Oral every 6 hours PRN Cough      Vital Signs Last 24 Hrs  T(C): 36.4 (13 Nov 2022 04:55), Max: 36.9 (12 Nov 2022 17:31)  T(F): 97.6 (13 Nov 2022 04:55), Max: 98.4 (12 Nov 2022 17:31)  HR: 76 (13 Nov 2022 04:55) (65 - 86)  BP: 130/79 (13 Nov 2022 04:55) (123/71 - 138/79)  BP(mean): --  RR: 17 (13 Nov 2022 04:55) (16 - 17)  SpO2: 96% (13 Nov 2022 04:55) (96% - 98%)    Parameters below as of 13 Nov 2022 04:55  Patient On (Oxygen Delivery Method): room air        PHYSICAL EXAMINATION:  GENERAL: NAD, well built  HEAD:  Atraumatic, Normocephalic  EYES:  conjunctiva and sclera clear  NECK: Supple, No JVD, Normal thyroid  CHEST/LUNG: POSITIVE B/L expiratory wheezing throughout lung fields. Clear to percussion bilaterally; No rales, rhonchi, or rubs  HEART: Regular rate and rhythm; No murmurs, rubs, or gallops  ABDOMEN: Soft, Nontender, Nondistended; Bowel sounds present, no pain or masses on palpation  NERVOUS SYSTEM:  Alert & Oriented X3  EXTREMITIES:  2+ Peripheral Pulses, No clubbing, cyanosis, or edema  SKIN: warm dry                          10.8   3.16  )-----------( 156      ( 12 Nov 2022 06:02 )             32.6     11-12    128<L>  |  96  |  15  ----------------------------<  175<H>  3.5   |  27  |  0.60    Ca    9.2      12 Nov 2022 06:02  Phos  2.1     11-12  Mg     1.4     11-12    TPro  6.7  /  Alb  3.0<L>  /  TBili  0.2  /  DBili  x   /  AST  15  /  ALT  28  /  AlkPhos  87  11-11    LIVER FUNCTIONS - ( 11 Nov 2022 15:50 )  Alb: 3.0 g/dL / Pro: 6.7 g/dL / ALK PHOS: 87 U/L / ALT: 28 U/L DA / AST: 15 U/L / GGT: x               PT/INR - ( 11 Nov 2022 15:50 )   PT: 13.0 sec;   INR: 1.09 ratio         PTT - ( 11 Nov 2022 15:50 )  PTT:33.1 sec    I&O's Summary          CAPILLARY BLOOD GLUCOSE      RADIOLOGY & ADDITIONAL TESTS:

## 2022-11-13 NOTE — PROGRESS NOTE ADULT - PROBLEM SELECTOR PLAN 1
patient presents with 2 episodes of LOC without head trauma, more likely to be syncope in the setting of new arrhythmia (aflutter) as well as acute viral URI (influenza A)    - telemetry monitoring  - serial troponin, EKG  - TTE  - Cardiology consult, Dr. Rivera  - Orthostatic vitals to rule out orthostatic causes of syncope  - CT head to rule out intracranial hemorrhage/CVA - patient presents with 2 episodes of LOC without head trauma, more likely to be syncope in the setting of new arrhythmia (aflutter) as well as acute viral URI (influenza A)  - telemetry monitoring  - serial troponin Negative x3  - EKG - A. Fib  - F/U TTE  - Cardiology consult, Dr. Rivera  - Orthostatic vitals Positive - grater than 20 change in systolic and > 10 change in diastolic  - CT head - no acute changes

## 2022-11-13 NOTE — PROGRESS NOTE ADULT - PROBLEM SELECTOR PLAN 4
analgesics prn  gentle hydration  Tamiflu 75 mgs po BID for 5 days. Bronchodilators  oxygen supp  [facility]

## 2022-11-13 NOTE — PROGRESS NOTE ADULT - PROBLEM SELECTOR PLAN 9
dvt ppx, requires full dose lovenox in the setting of atrial flutter however will hold for now until [facility] results - dvt ppx, requires full dose lovenox

## 2022-11-13 NOTE — PROGRESS NOTE ADULT - PROBLEM SELECTOR PLAN 5
patient has history of PVD with varicose veins b/l on exam  takes ezetimibe 10 mg qd and xarelto 2.5 mg qhs for dvt ppxper surescripts  Unable to perform MEDREC as pharmacy is closed - [facility] [phone]    - will hold xarelto for now until [facility] results - patient has history of PVD with varicose veins b/l on exam  - takes ezetimibe 10 mg qd and xarelto 2.5 mg qhs for dvt ppxper surescripts  - will hold xarelto for now until [facility] results

## 2022-11-13 NOTE — PROGRESS NOTE ADULT - PROBLEM SELECTOR PLAN 4
patient presents with hyponatremia to 126 on admission s/p 1 L NS in ED and fluids by EMS as well  likely due to dehydration in the setting of acute viral URI    - c/w IV hydration  - monitor Na  - consider further workup of hyponatremia if no improvement with IVF - patient presents with hyponatremia to 126 on admission s/p 1 L NS in ED and fluids by EMS as well  - likely due to dehydration in the setting of acute viral URI  - Increase rate of IV NS to 75cc/hr  - monitor Na  - consider further workup of hyponatremia if no improvement with IVF

## 2022-11-13 NOTE — PROGRESS NOTE ADULT - PROBLEM SELECTOR PLAN 6
patient takes metoprolol ER 50 mg qd, HCTZ 25 mg qd, losartan 25 mg qd, amlodipine 10 mg qd per levi  Unable to perform MEDREC as pharmacy is closed - [facility] [phone]    - continue with low dose lopressor twice daily with holding parameters  - monitor BP and resume meds as above as needed - patient takes metoprolol ER 50 mg qd, HCTZ 25 mg qd, losartan 25 mg qd, amlodipine 10 mg qd per surescripts  - continue with low dose lopressor twice daily with holding parameters  - monitor BP and resume meds as above as needed

## 2022-11-13 NOTE — PROGRESS NOTE ADULT - PROBLEM SELECTOR PLAN 7
patient takes glipizide 5 mg ER qd, farxiga 10 mg qd per levi  Unable to perform MEDREC as pharmacy is closed - [facility] [phone]    - insulin sliding scale [facility]  - monitor glucose - patient takes glipizide 5 mg ER qd, farxiga 10 mg qd per surescripts  - insulin sliding scale acqhs  - monitor glucose

## 2022-11-13 NOTE — PROGRESS NOTE ADULT - PROBLEM SELECTOR PLAN 2
patient presents with A flutter on EKG, irregular rhythm on exam  rate controlled, new-onset as patient does not have known history of arrhythmia  CHADSVASC 6  patient takes metoprolol ER 50 mg qd per surescriiron  Unable to perform MEDREC as pharmacy is closed - [facility] [phone]    - continue with low dose lopressor twice daily for rate control, increase as tolerated/necessary  - CT head to rule out intracranial hemorrhage/CVA  - await CT results prior to starting full dose AC with lovenox to rule out hemorrhage  - TTE to rule out valvular atrial arrhythmia - patient presents with A Fib on EKG, irregularly irregular rhythm  - rate controlled, new-onset as patient does not have known history of arrhythmia  - CHADSVASC 6  - patient takes metoprolol ER 50 mg qd per surescripts  - Unable to perform MEDREC as pharmacy is closed - [facility] [phone]  - continue with low dose lopressor twice daily for rate control, increase as tolerated/necessary  - CT head - no acute changes  - F/U TTE to rule out valvular atrial arrhythmia

## 2022-11-13 NOTE — PROGRESS NOTE ADULT - PROBLEM SELECTOR PLAN 3
Pt tested positive for influenza A in ED 11/11    - tamiflu 75 mg bid for 5 days  - prn acetaminophen for fever/headache  - droplet isolation - Pt tested positive for influenza A in ED 11/11  - Continue tamiflu 75 mg bid for 5 days total (day 3)  - prn acetaminophen for fever/headache  - droplet isolation

## 2022-11-14 ENCOUNTER — TRANSCRIPTION ENCOUNTER (OUTPATIENT)
Age: 87
End: 2022-11-14

## 2022-11-14 DIAGNOSIS — R79.89 OTHER SPECIFIED ABNORMAL FINDINGS OF BLOOD CHEMISTRY: ICD-10-CM

## 2022-11-14 LAB
ANION GAP SERPL CALC-SCNC: 9 MMOL/L — SIGNIFICANT CHANGE UP (ref 5–17)
BUN SERPL-MCNC: 20 MG/DL — HIGH (ref 7–18)
CALCIUM SERPL-MCNC: 9 MG/DL — SIGNIFICANT CHANGE UP (ref 8.4–10.5)
CHLORIDE SERPL-SCNC: 104 MMOL/L — SIGNIFICANT CHANGE UP (ref 96–108)
CO2 SERPL-SCNC: 23 MMOL/L — SIGNIFICANT CHANGE UP (ref 22–31)
CREAT SERPL-MCNC: 0.76 MG/DL — SIGNIFICANT CHANGE UP (ref 0.5–1.3)
EGFR: 76 ML/MIN/1.73M2 — SIGNIFICANT CHANGE UP
GLUCOSE BLDC GLUCOMTR-MCNC: 235 MG/DL — HIGH (ref 70–99)
GLUCOSE BLDC GLUCOMTR-MCNC: 271 MG/DL — HIGH (ref 70–99)
GLUCOSE BLDC GLUCOMTR-MCNC: 274 MG/DL — HIGH (ref 70–99)
GLUCOSE BLDC GLUCOMTR-MCNC: 301 MG/DL — HIGH (ref 70–99)
GLUCOSE SERPL-MCNC: 250 MG/DL — HIGH (ref 70–99)
HCT VFR BLD CALC: 32.5 % — LOW (ref 34.5–45)
HGB BLD-MCNC: 10.6 G/DL — LOW (ref 11.5–15.5)
MAGNESIUM SERPL-MCNC: 2.2 MG/DL — SIGNIFICANT CHANGE UP (ref 1.6–2.6)
MCHC RBC-ENTMCNC: 26.9 PG — LOW (ref 27–34)
MCHC RBC-ENTMCNC: 32.6 GM/DL — SIGNIFICANT CHANGE UP (ref 32–36)
MCV RBC AUTO: 82.5 FL — SIGNIFICANT CHANGE UP (ref 80–100)
NRBC # BLD: 0 /100 WBCS — SIGNIFICANT CHANGE UP (ref 0–0)
PHOSPHATE SERPL-MCNC: 2.1 MG/DL — LOW (ref 2.5–4.5)
PLATELET # BLD AUTO: 178 K/UL — SIGNIFICANT CHANGE UP (ref 150–400)
POTASSIUM SERPL-MCNC: 4.2 MMOL/L — SIGNIFICANT CHANGE UP (ref 3.5–5.3)
POTASSIUM SERPL-SCNC: 4.2 MMOL/L — SIGNIFICANT CHANGE UP (ref 3.5–5.3)
RBC # BLD: 3.94 M/UL — SIGNIFICANT CHANGE UP (ref 3.8–5.2)
RBC # FLD: 14.6 % — HIGH (ref 10.3–14.5)
SODIUM SERPL-SCNC: 136 MMOL/L — SIGNIFICANT CHANGE UP (ref 135–145)
WBC # BLD: 6.18 K/UL — SIGNIFICANT CHANGE UP (ref 3.8–10.5)
WBC # FLD AUTO: 6.18 K/UL — SIGNIFICANT CHANGE UP (ref 3.8–10.5)

## 2022-11-14 RX ORDER — POTASSIUM PHOSPHATE, MONOBASIC POTASSIUM PHOSPHATE, DIBASIC 236; 224 MG/ML; MG/ML
15 INJECTION, SOLUTION INTRAVENOUS ONCE
Refills: 0 | Status: COMPLETED | OUTPATIENT
Start: 2022-11-14 | End: 2022-11-14

## 2022-11-14 RX ORDER — METFORMIN HYDROCHLORIDE 850 MG/1
1 TABLET ORAL
Qty: 0 | Refills: 0 | DISCHARGE

## 2022-11-14 RX ORDER — LOSARTAN POTASSIUM 100 MG/1
1 TABLET, FILM COATED ORAL
Qty: 0 | Refills: 0 | DISCHARGE

## 2022-11-14 RX ORDER — LORATADINE 10 MG/1
1 TABLET ORAL
Qty: 0 | Refills: 0 | DISCHARGE

## 2022-11-14 RX ORDER — MELOXICAM 15 MG/1
1 TABLET ORAL
Qty: 0 | Refills: 0 | DISCHARGE

## 2022-11-14 RX ORDER — FLUTICASONE PROPIONATE AND SALMETEROL 50; 250 UG/1; UG/1
1 POWDER ORAL; RESPIRATORY (INHALATION)
Qty: 0 | Refills: 0 | DISCHARGE

## 2022-11-14 RX ADMIN — Medication 1: at 22:43

## 2022-11-14 RX ADMIN — Medication 75 MILLIGRAM(S): at 06:05

## 2022-11-14 RX ADMIN — Medication 40 MILLIGRAM(S): at 06:05

## 2022-11-14 RX ADMIN — Medication 40 MILLIGRAM(S): at 11:31

## 2022-11-14 RX ADMIN — PANTOPRAZOLE SODIUM 40 MILLIGRAM(S): 20 TABLET, DELAYED RELEASE ORAL at 06:05

## 2022-11-14 RX ADMIN — Medication 75 MILLIGRAM(S): at 17:46

## 2022-11-14 RX ADMIN — Medication 12.5 MILLIGRAM(S): at 06:05

## 2022-11-14 RX ADMIN — Medication 12.5 MILLIGRAM(S): at 17:45

## 2022-11-14 RX ADMIN — Medication 2: at 07:53

## 2022-11-14 RX ADMIN — POTASSIUM PHOSPHATE, MONOBASIC POTASSIUM PHOSPHATE, DIBASIC 62.5 MILLIMOLE(S): 236; 224 INJECTION, SOLUTION INTRAVENOUS at 11:32

## 2022-11-14 RX ADMIN — Medication 3: at 11:31

## 2022-11-14 RX ADMIN — ENOXAPARIN SODIUM 60 MILLIGRAM(S): 100 INJECTION SUBCUTANEOUS at 06:05

## 2022-11-14 RX ADMIN — ENOXAPARIN SODIUM 60 MILLIGRAM(S): 100 INJECTION SUBCUTANEOUS at 17:44

## 2022-11-14 RX ADMIN — ATORVASTATIN CALCIUM 40 MILLIGRAM(S): 80 TABLET, FILM COATED ORAL at 22:13

## 2022-11-14 RX ADMIN — Medication 4: at 17:44

## 2022-11-14 NOTE — PROGRESS NOTE ADULT - PROBLEM SELECTOR PLAN 5
- patient has history of PVD with varicose veins b/l on exam  - takes ezetimibe 10 mg qd and xarelto 2.5 mg qhs for dvt ppxper surescripts  - will hold xarelto for now until [facility] results

## 2022-11-14 NOTE — CONSULT NOTE ADULT - PROBLEM SELECTOR RECOMMENDATION 9
r/o hyperthyroidism- no sx but with new onset A-fib  repeat tsh  consider tapazole 10mg qd  d/w family and prim team
CT head noted  Carotid doppler  Neuro eval  Tele monitoring

## 2022-11-14 NOTE — DISCHARGE NOTE PROVIDER - PROVIDER TOKENS
PROVIDER:[TOKEN:[[id number]],FOLLOWUP:[2 weeks]] PROVIDER:[TOKEN:[[id number]],FOLLOWUP:[2 weeks]],PROVIDER:[TOKEN:[[id number]],FOLLOWUP:[1 month]]

## 2022-11-14 NOTE — PROGRESS NOTE ADULT - PROBLEM SELECTOR PLAN 6
- patient takes metoprolol ER 50 mg qd, HCTZ 25 mg qd, losartan 25 mg qd, amlodipine 10 mg qd per surescripts  - continue with low dose lopressor twice daily with holding parameters  - monitor BP and resume meds as above as needed

## 2022-11-14 NOTE — CONSULT NOTE ADULT - SUBJECTIVE AND OBJECTIVE BOX
Patient is a 87y old  Female who presents with a chief complaint of Syncope; new onset Afib (14 Nov 2022 10:29)      HPI:  87 year old F with PMH of HTN, HLD, DM, recent URI presents after two episodes of loss of consciousness at home. Patient seen with grandchildren at bedside. State that the patient called out for help from the bathroom when it appeared she had lost consciousness on the toilet after a bowel movement. Denies head trauma, tongue bite or urinary incontinence but unclear if bowel incontinence or patient already had a bowel movement prior to episode of loss of consciousness. Patient's family then called EMS, and upon EMS arrival, granddaughter states that she once again lost consciousness and was told it was a "silent seizure" by EMS as she had constricted pupils and low blood pressure. Pt's granddaughter denies any urinary or bowel incontinence, jerky movements, eyes rolling back, or tongue bite. Patient reports headache and chills at this time, and denies dizziness, fever, n/v/d, lightheadedness, chest pain, shortness of breath, changes in urinary or bowel habits.     ED Course  Vitals /64 P 80 R 16 T 97.7F SpO2 96% RA  Meds: 1 L NS  EKG: A flutter @ 74 bpm (11 Nov 2022 17:55)      PAST MEDICAL & SURGICAL HISTORY:  Diabetes      History of hypertension      Hyperlipidemia      CAD (coronary artery disease)      No significant past surgical history             MEDICATIONS  (STANDING):  atorvastatin 40 milliGRAM(s) Oral at bedtime  enoxaparin Injectable 60 milliGRAM(s) SubCutaneous every 12 hours  insulin lispro (ADMELOG) corrective regimen sliding scale   SubCutaneous three times a day before meals  insulin lispro (ADMELOG) corrective regimen sliding scale   SubCutaneous at bedtime  metoprolol tartrate 12.5 milliGRAM(s) Oral every 12 hours  oseltamivir 75 milliGRAM(s) Oral two times a day  pantoprazole    Tablet 40 milliGRAM(s) Oral before breakfast  potassium phosphate IVPB 15 milliMole(s) IV Intermittent once  predniSONE   Tablet 40 milliGRAM(s) Oral daily  sodium chloride 0.9%. 1000 milliLiter(s) (75 mL/Hr) IV Continuous <Continuous>    MEDICATIONS  (PRN):  acetaminophen     Tablet .. 650 milliGRAM(s) Oral every 6 hours PRN Temp greater or equal to 38C (100.4F), Mild Pain (1 - 3)  albuterol    90 MICROgram(s) HFA Inhaler 2 Puff(s) Inhalation every 6 hours PRN Shortness of Breath and/or Wheezing  guaiFENesin Oral Liquid (Sugar-Free) 200 milliGRAM(s) Oral every 6 hours PRN Cough      FAMILY HISTORY:  FH: type 1 diabetes    FH: hypertension    FH: hyperlipidemia    FH: lung cancer (Child)        SOCIAL HISTORY:      REVIEW OF SYSTEMS:  CONSTITUTIONAL: No fever, weight loss, or fatigue  EYES: No eye pain, visual disturbances, or discharge  ENT:  No difficulty hearing, tinnitus, vertigo; No sinus or throat pain  NECK: No pain or stiffness  RESPIRATORY: No cough, wheezing, chills or hemoptysis; No Shortness of Breath  CARDIOVASCULAR: No chest pain, palpitations, passing out, dizziness, or leg swelling  GASTROINTESTINAL: No abdominal or epigastric pain. No nausea, vomiting, or hematemesis; No diarrhea or constipation. No melena or hematochezia.  GENITOURINARY: No dysuria, frequency, hematuria, or incontinence  NEUROLOGICAL: No headaches, memory loss, loss of strength, numbness, or tremors  SKIN: No itching, burning, rashes, or lesions   LYMPH Nodes: No enlarged glands  ENDOCRINE: No heat or cold intolerance; No hair loss  MUSCULOSKELETAL: No joint pain or swelling; No muscle, back, or extremity pain  PSYCHIATRIC: No depression, anxiety, mood swings, or difficulty sleeping  HEME/LYMPH: No easy bruising, or bleeding gums  ALLERGY AND IMMUNOLOGIC: No hives or eczema	        Vital Signs Last 24 Hrs  T(C): 36.3 (14 Nov 2022 04:25), Max: 37 (13 Nov 2022 14:00)  T(F): 97.4 (14 Nov 2022 04:25), Max: 98.6 (13 Nov 2022 14:00)  HR: 80 (14 Nov 2022 04:25) (68 - 83)  BP: 130/74 (14 Nov 2022 04:25) (119/65 - 158/84)  BP(mean): --  RR: 18 (14 Nov 2022 04:25) (18 - 20)  SpO2: 95% (14 Nov 2022 04:25) (95% - 97%)    Parameters below as of 14 Nov 2022 04:25  Patient On (Oxygen Delivery Method): room air          Constitutional:    NC/AT:    HEENT:    Neck:  No JVD, bruits or thyromegaly    Respiratory:  Clear without rales or rhonchi    Cardiovascular:  RR without murmur, rub or gallop.    Gastrointestinal: Soft without hepatosplenomegaly.    Extremities: without cyanosis, clubbing or edema.    Neurological:  Oriented   x      . No gross sensory or motor defects.        LABS:                        10.6   6.18  )-----------( 178      ( 14 Nov 2022 06:25 )             32.5     11-14    136  |  104  |  20<H>  ----------------------------<  250<H>  4.2   |  23  |  0.76    Ca    9.0      14 Nov 2022 06:25  Phos  2.1     11-14  Mg     2.2     11-14              CAPILLARY BLOOD GLUCOSE      POCT Blood Glucose.: 235 mg/dL (14 Nov 2022 07:46)  POCT Blood Glucose.: 274 mg/dL (13 Nov 2022 21:52)  POCT Blood Glucose.: 255 mg/dL (13 Nov 2022 16:59)  POCT Blood Glucose.: 263 mg/dL (13 Nov 2022 11:40)      RADIOLOGY & ADDITIONAL STUDIES: Patient is a 87y old  Female who presents with a chief complaint of Syncope; new onset Afib (14 Nov 2022 10:29)      HPI:  87 year old F with PMH of HTN, HLD, DM, recent URI presents after two episodes of loss of consciousness at home. Patient seen with grandchildren at bedside. State that the patient called out for help from the bathroom when it appeared she had lost consciousness on the toilet after a bowel movement. Denies head trauma, tongue bite or urinary incontinence but unclear if bowel incontinence or patient already had a bowel movement prior to episode of loss of consciousness. Patient's family then called EMS, and upon EMS arrival, granddaughter states that she once again lost consciousness and was told it was a "silent seizure" by EMS as she had constricted pupils and low blood pressure. Pt's granddaughter denies any urinary or bowel incontinence, jerky movements, eyes rolling back, or tongue bite. Patient reports headache and chills at this time, and denies dizziness, fever, n/v/d, lightheadedness, chest pain, shortness of breath, changes in urinary or bowel habits.   Found to have abn tfts. Pt and family denies prev hx of thyroid dz. Denies palp/wt loss. Has hx of dm on glipizide and farxiga as out pt.     ED Course  Vitals /64 P 80 R 16 T 97.7F SpO2 96% RA  Meds: 1 L NS  EKG: A flutter @ 74 bpm (11 Nov 2022 17:55)      PAST MEDICAL & SURGICAL HISTORY:  Diabetes      History of hypertension      Hyperlipidemia      CAD (coronary artery disease)      No significant past surgical history             MEDICATIONS  (STANDING):  atorvastatin 40 milliGRAM(s) Oral at bedtime  enoxaparin Injectable 60 milliGRAM(s) SubCutaneous every 12 hours  insulin lispro (ADMELOG) corrective regimen sliding scale   SubCutaneous three times a day before meals  insulin lispro (ADMELOG) corrective regimen sliding scale   SubCutaneous at bedtime  metoprolol tartrate 12.5 milliGRAM(s) Oral every 12 hours  oseltamivir 75 milliGRAM(s) Oral two times a day  pantoprazole    Tablet 40 milliGRAM(s) Oral before breakfast  potassium phosphate IVPB 15 milliMole(s) IV Intermittent once  predniSONE   Tablet 40 milliGRAM(s) Oral daily  sodium chloride 0.9%. 1000 milliLiter(s) (75 mL/Hr) IV Continuous <Continuous>    MEDICATIONS  (PRN):  acetaminophen     Tablet .. 650 milliGRAM(s) Oral every 6 hours PRN Temp greater or equal to 38C (100.4F), Mild Pain (1 - 3)  albuterol    90 MICROgram(s) HFA Inhaler 2 Puff(s) Inhalation every 6 hours PRN Shortness of Breath and/or Wheezing  guaiFENesin Oral Liquid (Sugar-Free) 200 milliGRAM(s) Oral every 6 hours PRN Cough      FAMILY HISTORY:  FH: type 1 diabetes    FH: hypertension    FH: hyperlipidemia    FH: lung cancer (Child)        SOCIAL HISTORY:      REVIEW OF SYSTEMS:  CONSTITUTIONAL: No fever, weight loss, or fatigue  EYES: No eye pain, visual disturbances, or discharge  ENT:  No difficulty hearing, tinnitus, vertigo; No sinus or throat pain  NECK: No pain or stiffness  RESPIRATORY: No cough, wheezing, chills or hemoptysis; No Shortness of Breath  CARDIOVASCULAR: No chest pain, palpitations, passing out, dizziness, or leg swelling  GASTROINTESTINAL: No abdominal or epigastric pain. No nausea, vomiting, or hematemesis; No diarrhea or constipation. No melena or hematochezia.  GENITOURINARY: No dysuria, frequency, hematuria, or incontinence  NEUROLOGICAL: No headaches, memory loss, loss of strength, numbness, or tremors  SKIN: No itching, burning, rashes, or lesions   LYMPH Nodes: No enlarged glands  ENDOCRINE: No heat or cold intolerance; No hair loss  MUSCULOSKELETAL: No joint pain or swelling; No muscle, back, or extremity pain  PSYCHIATRIC: No depression, anxiety, mood swings, or difficulty sleeping  HEME/LYMPH: No easy bruising, or bleeding gums  ALLERGY AND IMMUNOLOGIC: No hives or eczema	        Vital Signs Last 24 Hrs  T(C): 36.3 (14 Nov 2022 04:25), Max: 37 (13 Nov 2022 14:00)  T(F): 97.4 (14 Nov 2022 04:25), Max: 98.6 (13 Nov 2022 14:00)  HR: 80 (14 Nov 2022 04:25) (68 - 83)  BP: 130/74 (14 Nov 2022 04:25) (119/65 - 158/84)  BP(mean): --  RR: 18 (14 Nov 2022 04:25) (18 - 20)  SpO2: 95% (14 Nov 2022 04:25) (95% - 97%)    Parameters below as of 14 Nov 2022 04:25  Patient On (Oxygen Delivery Method): room air          Constitutional:    NC/AT:    HEENT:    Neck:  No JVD, bruits or thyromegaly    Respiratory:  Clear without rales or rhonchi    Cardiovascular:  RR without murmur, rub or gallop.    Gastrointestinal: Soft without hepatosplenomegaly.    Extremities: without cyanosis, clubbing or edema.    Neurological:  Oriented   x      . No gross sensory or motor defects.        LABS:                        10.6   6.18  )-----------( 178      ( 14 Nov 2022 06:25 )             32.5     11-14    136  |  104  |  20<H>  ----------------------------<  250<H>  4.2   |  23  |  0.76    Ca    9.0      14 Nov 2022 06:25  Phos  2.1     11-14  Mg     2.2     11-14              CAPILLARY BLOOD GLUCOSE      POCT Blood Glucose.: 235 mg/dL (14 Nov 2022 07:46)  POCT Blood Glucose.: 274 mg/dL (13 Nov 2022 21:52)  POCT Blood Glucose.: 255 mg/dL (13 Nov 2022 16:59)  POCT Blood Glucose.: 263 mg/dL (13 Nov 2022 11:40)      RADIOLOGY & ADDITIONAL STUDIES:

## 2022-11-14 NOTE — PROGRESS NOTE ADULT - PROBLEM SELECTOR PLAN 3
- Pt tested positive for influenza A in ED 11/11  - Continue tamiflu 75 mg bid for 5 days total (day 3)  - prn acetaminophen for fever/headache  - droplet isolation - Pt tested positive for influenza A in ED 11/11  - Continue tamiflu 75 mg bid for 5 days total (day 3)  - prn acetaminophen for fever/headache  - droplet isolation  - Wheezing improving, not requiring Supplemental O2

## 2022-11-14 NOTE — PROGRESS NOTE ADULT - SUBJECTIVE AND OBJECTIVE BOX
Patient is a 87y old  Female who presents with a chief complaint of Syncope; new onset Afib (14 Nov 2022 08:38)    Patient is awake, alert, comfortable out of bed to chair, not in acute distress on RA. Patient is currently on stress test.     INTERVAL HPI/OVERNIGHT EVENTS:      VITAL SIGNS:  T(F): 97.4 (11-14-22 @ 04:25)  HR: 80 (11-14-22 @ 04:25)  BP: 130/74 (11-14-22 @ 04:25)  RR: 18 (11-14-22 @ 04:25)  SpO2: 95% (11-14-22 @ 04:25)  Wt(kg): --  I&O's Detail          REVIEW OF SYSTEMS:    CONSTITUTIONAL:  No fevers, chills, sweats    HEENT:  Eyes:  No diplopia or blurred vision. ENT:  No earache, sore throat or runny nose.    CARDIOVASCULAR:  No pressure, squeezing, tightness, or heaviness about the chest; no palpitations.    RESPIRATORY:  Per HPI    GASTROINTESTINAL:  No abdominal pain, nausea, vomiting or diarrhea.    GENITOURINARY:  No dysuria, frequency or urgency.    NEUROLOGIC:  No paresthesias, fasciculations, seizures or weakness.    PSYCHIATRIC:  No disorder of thought or mood.      PHYSICAL EXAM:    Constitutional: Well developed and nourished  Eyes:Perrla  ENMT: normal  Neck:supple  Respiratory: + wheezing B/L  Cardiovascular: S1 S2 regular  Gastrointestinal: Soft, Non tender  Extremities: No edema  Vascular:normal  Neurological:Awake, alert,Ox3  Musculoskeletal:Normal      MEDICATIONS  (STANDING):  atorvastatin 40 milliGRAM(s) Oral at bedtime  enoxaparin Injectable 60 milliGRAM(s) SubCutaneous every 12 hours  insulin lispro (ADMELOG) corrective regimen sliding scale   SubCutaneous three times a day before meals  insulin lispro (ADMELOG) corrective regimen sliding scale   SubCutaneous at bedtime  methylPREDNISolone sodium succinate Injectable 40 milliGRAM(s) IV Push every 12 hours  metoprolol tartrate 12.5 milliGRAM(s) Oral every 12 hours  oseltamivir 75 milliGRAM(s) Oral two times a day  pantoprazole    Tablet 40 milliGRAM(s) Oral before breakfast  sodium chloride 0.9%. 1000 milliLiter(s) (75 mL/Hr) IV Continuous <Continuous>    MEDICATIONS  (PRN):  acetaminophen     Tablet .. 650 milliGRAM(s) Oral every 6 hours PRN Temp greater or equal to 38C (100.4F), Mild Pain (1 - 3)  albuterol    90 MICROgram(s) HFA Inhaler 2 Puff(s) Inhalation every 6 hours PRN Shortness of Breath and/or Wheezing  guaiFENesin Oral Liquid (Sugar-Free) 200 milliGRAM(s) Oral every 6 hours PRN Cough      Allergies    No Known Allergies    Intolerances        LABS:                        10.6   6.18  )-----------( 178      ( 14 Nov 2022 06:25 )             32.5     11-14    136  |  104  |  20<H>  ----------------------------<  250<H>  4.2   |  23  |  0.76    Ca    9.0      14 Nov 2022 06:25  Phos  2.1     11-14  Mg     2.2     11-14                CAPILLARY BLOOD GLUCOSE      POCT Blood Glucose.: 235 mg/dL (14 Nov 2022 07:46)  POCT Blood Glucose.: 274 mg/dL (13 Nov 2022 21:52)  POCT Blood Glucose.: 255 mg/dL (13 Nov 2022 16:59)  POCT Blood Glucose.: 263 mg/dL (13 Nov 2022 11:40)    pro-bnp 352 11-11 @ 15:50     d-dimer --  11-11 @ 15:50      RADIOLOGY & ADDITIONAL TESTS:    CXR:    Ct scan chest:    ekg;    echo:  < from: Transthoracic Echocardiogram (11.12.22 @ 06:51) >  CONCLUSIONS:  1. Normal mitral valve. Moderate mitral regurgitation.  2. Normal trileaflet aortic valve. Mild aortic  regurgitation.  3. Aortic Root: 3.2 cm.  4. Severely dilated left atrium.  LA volume index = 54  cc/m2.  5. Normal Left Ventricular Systolic Function,  (EF = 55 to  60%)  6. Unable to adequately assess diastolic function due to  technical aspects of this study.  7. Normal right atrium.  8. Normal right ventricular size and systolic function  (TAPSE  1.9cm).  9. RV systolic pressure is mildly increased at  41 mm Hg.  10. There is mild tricuspid regurgitation.  11. There is trace pulmonic regurgitation.  12. Normal pericardium with no pericardial effusion.    < end of copied text >

## 2022-11-14 NOTE — PROGRESS NOTE ADULT - SUBJECTIVE AND OBJECTIVE BOX
CHIEF COMPLAINT:Patient is a 87y old  Female who presents with a chief complaint of Syncope; new onset Afib.Pt appears comfortable.    	  REVIEW OF SYSTEMS:  CONSTITUTIONAL: No fever, weight loss, or fatigue  EYES: No eye pain, visual disturbances, or discharge  ENT:  No difficulty hearing, tinnitus, vertigo; No sinus or throat pain  NECK: No pain or stiffness  RESPIRATORY: No cough, wheezing, chills or hemoptysis; No Shortness of Breath  CARDIOVASCULAR: No chest pain, palpitations, passing out, dizziness, or leg swelling  GASTROINTESTINAL: No abdominal or epigastric pain. No nausea, vomiting, or hematemesis; No diarrhea or constipation. No melena or hematochezia.  GENITOURINARY: No dysuria, frequency, hematuria, or incontinence  NEUROLOGICAL: No headaches, memory loss, loss of strength, numbness, or tremors  SKIN: No itching, burning, rashes, or lesions   LYMPH Nodes: No enlarged glands  ENDOCRINE: No heat or cold intolerance; No hair loss  MUSCULOSKELETAL: No joint pain or swelling; No muscle, back, or extremity pain  PSYCHIATRIC: No depression, anxiety, mood swings, or difficulty sleeping  HEME/LYMPH: No easy bruising, or bleeding gums  ALLERGY AND IMMUNOLOGIC: No hives or eczema	        PHYSICAL EXAM:  T(C): 36.3 (11-14-22 @ 04:25), Max: 37 (11-13-22 @ 14:00)  HR: 80 (11-14-22 @ 04:25) (68 - 83)  BP: 130/74 (11-14-22 @ 04:25) (119/65 - 158/84)  RR: 18 (11-14-22 @ 04:25) (18 - 20)  SpO2: 95% (11-14-22 @ 04:25) (95% - 97%)  Wt(kg): --  I&O's Summary      Appearance: Normal	  HEENT:   Normal oral mucosa, PERRL, EOMI	  Lymphatic: No lymphadenopathy  Cardiovascular: Normal S1 S2, No JVD, No murmurs, No edema  Respiratory: Lungs clear to auscultation	  Psychiatry: A & O x 3, Mood & affect appropriate  Gastrointestinal:  Soft, Non-tender, + BS	  Skin: No rashes, No ecchymoses, No cyanosis	  Neurologic: Non-focal  Extremities: Normal range of motion, No clubbing, cyanosis or edema  Vascular: Peripheral pulses palpable 2+ bilaterally    MEDICATIONS  (STANDING):  atorvastatin 40 milliGRAM(s) Oral at bedtime  enoxaparin Injectable 60 milliGRAM(s) SubCutaneous every 12 hours  insulin lispro (ADMELOG) corrective regimen sliding scale   SubCutaneous three times a day before meals  insulin lispro (ADMELOG) corrective regimen sliding scale   SubCutaneous at bedtime  metoprolol tartrate 12.5 milliGRAM(s) Oral every 12 hours  oseltamivir 75 milliGRAM(s) Oral two times a day  pantoprazole    Tablet 40 milliGRAM(s) Oral before breakfast  potassium phosphate IVPB 15 milliMole(s) IV Intermittent once  predniSONE   Tablet 40 milliGRAM(s) Oral daily  sodium chloride 0.9%. 1000 milliLiter(s) (75 mL/Hr) IV Continuous <Continuous>    Tele-afib 60-80's    LABS:	 	    Troponin I, High Sensitivity Result: 7.4 ng/L (11-12 @ 01:22)  Troponin I, High Sensitivity Result: 8.3 ng/L (11-11 @ 15:50)                            10.6   6.18  )-----------( 178      ( 14 Nov 2022 06:25 )             32.5     11-14    136  |  104  |  20<H>  ----------------------------<  250<H>  4.2   |  23  |  0.76    Ca    9.0      14 Nov 2022 06:25  Phos  2.1     11-14  Mg     2.2     11-14      proBNP: Serum Pro-Brain Natriuretic Peptide: 352 pg/mL (11-11 @ 15:50)      TSH: Thyroid Stimulating Hormone, Serum: 0.24 uU/mL (11-13 @ 07:00)

## 2022-11-14 NOTE — PROGRESS NOTE ADULT - PROBLEM SELECTOR PLAN 2
- patient presents with A Fib on EKG, irregularly irregular rhythm  - rate controlled, new-onset as patient does not have known history of arrhythmia  - CHADSVASC 6  - patient takes metoprolol ER 50 mg qd per surescripts  - Unable to perform MEDREC as pharmacy is closed - [facility] [phone]  - continue with low dose lopressor twice daily for rate control, increase as tolerated/necessary  - CT head - no acute changes  - F/U TTE to rule out valvular atrial arrhythmia - patient presents with A Fib on EKG, irregularly irregular rhythm  - rate controlled, new-onset as patient does not have known history of arrhythmia  - CHADSVASC 6  - patient takes metoprolol ER 50 mg qd per surescripts  - will call pharmacy for MEDREC today, attempted overweekend however pharmacy was closed - [facility] [phone]  - continue with low dose lopressor twice daily for rate control, increase as tolerated/necessary  - Patient on xarelto at home however patient unsure of dose, will confirm with medrec  - CT head - no acute changes  - F/U Stress test this AM  - TTE - EF 55%, Severly enlarged left atrium

## 2022-11-14 NOTE — PROGRESS NOTE ADULT - SUBJECTIVE AND OBJECTIVE BOX
PGY-1 Progress Note discussed with attending      PLEASE CONTACT ON CALL TEAM:  - On Call Team (Please refer to Nelly) FROM 5:00 PM - 8:30PM  - Nightfloat Team FROM 8:30 -7:30 AM    INTERVAL HPI/OVERNIGHT EVENTS:       REVIEW OF SYSTEMS:  CONSTITUTIONAL: No fever, weight loss, or fatigue  RESPIRATORY: No cough, wheezing, chills or hemoptysis; No shortness of breath  CARDIOVASCULAR: No chest pain, palpitations, dizziness, or leg swelling  GASTROINTESTINAL: No abdominal pain. No nausea, vomiting, or hematemesis; No diarrhea or constipation. No melena or hematochezia.  GENITOURINARY: No dysuria or hematuria, urinary frequency  NEUROLOGICAL: No headaches, memory loss, loss of strength, numbness, or tremors  SKIN: No itching, burning, rashes, or lesions     MEDICATIONS  (STANDING):  atorvastatin 40 milliGRAM(s) Oral at bedtime  enoxaparin Injectable 60 milliGRAM(s) SubCutaneous every 12 hours  insulin lispro (ADMELOG) corrective regimen sliding scale   SubCutaneous three times a day before meals  insulin lispro (ADMELOG) corrective regimen sliding scale   SubCutaneous at bedtime  methylPREDNISolone sodium succinate Injectable 40 milliGRAM(s) IV Push every 12 hours  metoprolol tartrate 12.5 milliGRAM(s) Oral every 12 hours  oseltamivir 75 milliGRAM(s) Oral two times a day  pantoprazole    Tablet 40 milliGRAM(s) Oral before breakfast  sodium chloride 0.9%. 1000 milliLiter(s) (75 mL/Hr) IV Continuous <Continuous>    MEDICATIONS  (PRN):  acetaminophen     Tablet .. 650 milliGRAM(s) Oral every 6 hours PRN Temp greater or equal to 38C (100.4F), Mild Pain (1 - 3)  albuterol    90 MICROgram(s) HFA Inhaler 2 Puff(s) Inhalation every 6 hours PRN Shortness of Breath and/or Wheezing  guaiFENesin Oral Liquid (Sugar-Free) 200 milliGRAM(s) Oral every 6 hours PRN Cough      Vital Signs Last 24 Hrs  T(C): 36.3 (14 Nov 2022 04:25), Max: 37 (13 Nov 2022 14:00)  T(F): 97.4 (14 Nov 2022 04:25), Max: 98.6 (13 Nov 2022 14:00)  HR: 80 (14 Nov 2022 04:25) (68 - 83)  BP: 130/74 (14 Nov 2022 04:25) (119/65 - 158/84)  BP(mean): --  RR: 18 (14 Nov 2022 04:25) (18 - 20)  SpO2: 95% (14 Nov 2022 04:25) (95% - 97%)    Parameters below as of 14 Nov 2022 04:25  Patient On (Oxygen Delivery Method): room air        PHYSICAL EXAMINATION:  GENERAL: NAD, well built  HEAD:  Atraumatic, Normocephalic  EYES:  conjunctiva and sclera clear  NECK: Supple, No JVD, Normal thyroid  CHEST/LUNG: Clear to auscultation. Clear to percussion bilaterally; No rales, rhonchi, wheezing, or rubs  HEART: Regular rate and rhythm; No murmurs, rubs, or gallops  ABDOMEN: Soft, Nontender, Nondistended; Bowel sounds present, no pain or masses on palpation  NERVOUS SYSTEM:  Alert & Oriented X3  : voiding well  EXTREMITIES:  2+ Peripheral Pulses, No clubbing, cyanosis, or edema  SKIN: warm dry                          10.6   6.18  )-----------( 178      ( 14 Nov 2022 06:25 )             32.5     11-14    136  |  104  |  20<H>  ----------------------------<  250<H>  4.2   |  23  |  0.76    Ca    9.0      14 Nov 2022 06:25  Phos  2.1     11-14  Mg     2.2     11-14                I&O's Summary          CAPILLARY BLOOD GLUCOSE      RADIOLOGY & ADDITIONAL TESTS:                   PGY-1 Progress Note discussed with attending      PLEASE CONTACT ON CALL TEAM:  - On Call Team (Please refer to Nelly) FROM 5:00 PM - 8:30PM  - Nightfloat Team FROM 8:30 -7:30 AM    INTERVAL HPI/OVERNIGHT EVENTS: No acute events overnight.  Patient examined at bedside with granddaughter present.  Patient complains of chest wall pain that hurts when she coughs or presses on the lower left chest.  Patient denies chest pain at rest, Shortness of breath, N/V, Diarrhea constipation.      REVIEW OF SYSTEMS:  CONSTITUTIONAL: No fever, weight loss, or fatigue  RESPIRATORY: No cough, wheezing, chills or hemoptysis; No shortness of breath  CARDIOVASCULAR: Chest wall pain with cough and palpation; No chest pain, palpitations, dizziness, or leg swelling  GASTROINTESTINAL: No abdominal pain. No nausea, vomiting, or hematemesis; No diarrhea or constipation. No melena or hematochezia.  GENITOURINARY: No dysuria or hematuria, urinary frequency  NEUROLOGICAL: No headaches, memory loss, loss of strength, numbness, or tremors  SKIN: No itching, burning, rashes, or lesions     MEDICATIONS  (STANDING):  atorvastatin 40 milliGRAM(s) Oral at bedtime  enoxaparin Injectable 60 milliGRAM(s) SubCutaneous every 12 hours  insulin lispro (ADMELOG) corrective regimen sliding scale   SubCutaneous three times a day before meals  insulin lispro (ADMELOG) corrective regimen sliding scale   SubCutaneous at bedtime  methylPREDNISolone sodium succinate Injectable 40 milliGRAM(s) IV Push every 12 hours  metoprolol tartrate 12.5 milliGRAM(s) Oral every 12 hours  oseltamivir 75 milliGRAM(s) Oral two times a day  pantoprazole    Tablet 40 milliGRAM(s) Oral before breakfast  sodium chloride 0.9%. 1000 milliLiter(s) (75 mL/Hr) IV Continuous <Continuous>    MEDICATIONS  (PRN):  acetaminophen     Tablet .. 650 milliGRAM(s) Oral every 6 hours PRN Temp greater or equal to 38C (100.4F), Mild Pain (1 - 3)  albuterol    90 MICROgram(s) HFA Inhaler 2 Puff(s) Inhalation every 6 hours PRN Shortness of Breath and/or Wheezing  guaiFENesin Oral Liquid (Sugar-Free) 200 milliGRAM(s) Oral every 6 hours PRN Cough      Vital Signs Last 24 Hrs  T(C): 36.3 (14 Nov 2022 04:25), Max: 37 (13 Nov 2022 14:00)  T(F): 97.4 (14 Nov 2022 04:25), Max: 98.6 (13 Nov 2022 14:00)  HR: 80 (14 Nov 2022 04:25) (68 - 83)  BP: 130/74 (14 Nov 2022 04:25) (119/65 - 158/84)  BP(mean): --  RR: 18 (14 Nov 2022 04:25) (18 - 20)  SpO2: 95% (14 Nov 2022 04:25) (95% - 97%)    Parameters below as of 14 Nov 2022 04:25  Patient On (Oxygen Delivery Method): room air        PHYSICAL EXAMINATION:  GENERAL: NAD, lying in bed comfortable  HEAD:  Atraumatic, Normocephalic  EYES:  conjunctiva and sclera clear  NECK: Supple, No JVD, Normal thyroid  CHEST/LUNG: POSITIVE B/L expiratory wheezing throughout lung fields, improved since yesterday;  left Chest wall tenderness; Clear to percussion bilaterally; No rales, rhonchi, or rubs  HEART: Regular rate and rhythm; No murmurs, rubs, or gallops  ABDOMEN: Soft, Nontender, Nondistended; Bowel sounds present, no pain or masses on palpation  NERVOUS SYSTEM:  Alert & Oriented X3  EXTREMITIES:  2+ Peripheral Pulses, No clubbing, cyanosis, or edema  SKIN: warm dry                          10.6   6.18  )-----------( 178      ( 14 Nov 2022 06:25 )             32.5     11-14    136  |  104  |  20<H>  ----------------------------<  250<H>  4.2   |  23  |  0.76    Ca    9.0      14 Nov 2022 06:25  Phos  2.1     11-14  Mg     2.2     11-14                I&O's Summary          CAPILLARY BLOOD GLUCOSE      RADIOLOGY & ADDITIONAL TESTS:

## 2022-11-14 NOTE — PROGRESS NOTE ADULT - PROBLEM SELECTOR PLAN 8
- patient takes atorvastatin 40 mg qhs and ezetimibe 10 mg qd per surescripts  - c/w atorvastatin 40 mg qhs for now until Med[facility] is complete

## 2022-11-14 NOTE — DISCHARGE NOTE PROVIDER - NSDCCPCAREPLAN_GEN_ALL_CORE_FT
PRINCIPAL DISCHARGE DIAGNOSIS  Diagnosis: Syncope  Assessment and Plan of Treatment: You came in due to loss of consciousness. You had a CT scan of the head done which did not show any causes for your syncope. You were found to have a hearth arrythmia during your hospitalization. Please follow up with your primary care physician in one week to inform them of your recent hospitalization and further management of your medical conditions.        SECONDARY DISCHARGE DIAGNOSES  Diagnosis: Hypertension  Assessment and Plan of Treatment: You have high blood pressure. Please continue to taking your medications as prescribed. Please measure your blood pressure at least once daily. Maintain a healthy diet which includes incorporating more vegetables and decreasing the amount of salt (low sodium) and sugar in your diet such as rice, bread, and pasta low sugar, low fat, low sodium diet. Exercise frequently if possible.  Please follow up with your primary care provider within one week of your discharge.      Diagnosis: Hyponatremia  Assessment and Plan of Treatment: You were found to have low sodium levels thought to be due to dehydration from your viral infection. You were given IV fluids which has since resolved your low sodium levels.    Diagnosis: New onset atrial fibrillation  Assessment and Plan of Treatment: You were diagnosed with atrial fibrillation. This heart arrhythmia increases your risk of a stroke. You are prescribed _______. Please continue to take your medications as directed. Please follow up with PCP within 2 weeks of discharge.      Diagnosis: Hyperlipidemia  Assessment and Plan of Treatment: You have hyperlipidemia. Please continue to take your cholesterol medications. Maintain a healthy diet that consist of low sugar, low fat, low sodium. Decrease the amount of fried foods that you consume. Exercise frequently if possible. Please follow up with  your primary care provider within 1 week of your discharge.  You are recommended a DASH Diet that emphasizes mostly vegetables, fruits, and fat-free or low-fat dairy products. Please limit intake of sodium, sweets, beverages w/ high sugar/carbohydrate content.      Diagnosis: Influenza A  Assessment and Plan of Treatment: You were found to have influenza A. You were treated with Tamiflu during your hospitalization. Please follow up with your primary care physician in one week to inform them of your recent hospitalization and further management of your medical conditions.      Diagnosis: Diabetes  Assessment and Plan of Treatment: Continue with your blood sugar medication. HbA1C on admission was 7.6%. This is a reflection of your blood sugar level over the last 3 months.  Please check your blood sugar levels twice daily - Morning/Afternoon, and Lunch/Bedtime the following day. Keep a record of your blood sugar readings  You must maintain a healthy diet that consists of low sugar and low fat. Your diet must consist of mostly vegetables. Please limit your intake of high sugar and high carbohydrate foods such as pasta, rice, and bread. Exercise frequently if possible. Follow up with primary care physician within one week of your discharge to further manage your diabetes and monitor your Hemoglobin A1c levels after 3 months to evaluate your diabetes control.       PRINCIPAL DISCHARGE DIAGNOSIS  Diagnosis: Syncope  Assessment and Plan of Treatment: You came in due to loss of consciousness. You had a CT scan of the head done which did not show any causes for your syncope. You were found to have a hearth arrythmia during your hospitalization. Please follow up with your primary care physician in one week to inform them of your recent hospitalization and further management of your medical conditions.        SECONDARY DISCHARGE DIAGNOSES  Diagnosis: Diabetes  Assessment and Plan of Treatment: Continue with your blood sugar medication. HbA1C on admission was 7.6%. This is a reflection of your blood sugar level over the last 3 months.  Please check your blood sugar levels twice daily - Morning/Afternoon, and Lunch/Bedtime the following day. Keep a record of your blood sugar readings  You must maintain a healthy diet that consists of low sugar and low fat. Your diet must consist of mostly vegetables. Please limit your intake of high sugar and high carbohydrate foods such as pasta, rice, and bread. Exercise frequently if possible. Follow up with primary care physician within one week of your discharge to further manage your diabetes and monitor your Hemoglobin A1c levels after 3 months to evaluate your diabetes control.      Diagnosis: Hypertension  Assessment and Plan of Treatment: You have high blood pressure. Please continue to taking your medications as prescribed. Please measure your blood pressure at least once daily. Maintain a healthy diet which includes incorporating more vegetables and decreasing the amount of salt (low sodium) and sugar in your diet such as rice, bread, and pasta low sugar, low fat, low sodium diet. Exercise frequently if possible.  Please follow up with your primary care provider within one week of your discharge.      Diagnosis: Hyperlipidemia  Assessment and Plan of Treatment: You have hyperlipidemia. Please continue to take your cholesterol medications. Maintain a healthy diet that consist of low sugar, low fat, low sodium. Decrease the amount of fried foods that you consume. Exercise frequently if possible. Please follow up with  your primary care provider within 1 week of your discharge.  You are recommended a DASH Diet that emphasizes mostly vegetables, fruits, and fat-free or low-fat dairy products. Please limit intake of sodium, sweets, beverages w/ high sugar/carbohydrate content.      Diagnosis: Hyponatremia  Assessment and Plan of Treatment: You were found to have low sodium levels thought to be due to dehydration from your viral infection. You were given IV fluids which has since resolved your low sodium levels.    Diagnosis: Influenza A  Assessment and Plan of Treatment: You were found to have influenza A. You were treated with Tamiflu during your hospitalization. Please follow up with your primary care physician in one week to inform them of your recent hospitalization and further management of your medical conditions.      Diagnosis: New onset atrial fibrillation  Assessment and Plan of Treatment: You were diagnosed with atrial fibrillation. This heart arrhythmia increases your risk of a stroke. You are prescribed _______. Please continue to take your medications as directed. Please follow up with PCP within 2 weeks of discharge.       PRINCIPAL DISCHARGE DIAGNOSIS  Diagnosis: Syncope  Assessment and Plan of Treatment: You came in due to loss of consciousness. You had a CT scan of the head done which did not show any causes for your syncope. You were found to have a hearth arrythmia during your hospitalization. Please follow up with your primary care physician in one week to inform them of your recent hospitalization and further management of your medical conditions.        SECONDARY DISCHARGE DIAGNOSES  Diagnosis: Hypertension  Assessment and Plan of Treatment: You have high blood pressure. Please continue to taking your medications as prescribed. Please measure your blood pressure at least once daily. Maintain a healthy diet which includes incorporating more vegetables and decreasing the amount of salt (low sodium) and sugar in your diet such as rice, bread, and pasta low sugar, low fat, low sodium diet. Exercise frequently if possible.  Please follow up with your primary care provider within one week of your discharge.      Diagnosis: Hyponatremia  Assessment and Plan of Treatment: You were found to have low sodium levels thought to be due to dehydration from your viral infection. You were given IV fluids which has since resolved your low sodium levels.    Diagnosis: New onset atrial fibrillation  Assessment and Plan of Treatment: You were diagnosed with atrial fibrillation. This heart arrhythmia increases your risk of a stroke. You are prescribed eliquis to lower your risk for a stroke. As this is a blood thinner, this will increase your risk of bleeding. Please continue to take your medications as directed. Please follow up with PCP within 2 weeks of discharge.      Diagnosis: Hyperthyroidism  Assessment and Plan of Treatment: You were found to have elevated thyroid levels which is likely contributing to your new heart arrhythmia. You are presceibed TAPAZOLE (also known as METHIMAZOLE) 10mg once daily. You were seen by an endocrinologist during your hospitalization. Please follow up with an endocrinologist within 1month of your discharge for further follow up and surveillance of your hyperthyroidism.    Diagnosis: Hyperlipidemia  Assessment and Plan of Treatment: You have hyperlipidemia. Please continue to take your cholesterol medications. Maintain a healthy diet that consist of low sugar, low fat, low sodium. Decrease the amount of fried foods that you consume. Exercise frequently if possible. Please follow up with  your primary care provider within 1 week of your discharge.  You are recommended a DASH Diet that emphasizes mostly vegetables, fruits, and fat-free or low-fat dairy products. Please limit intake of sodium, sweets, beverages w/ high sugar/carbohydrate content.      Diagnosis: Influenza A  Assessment and Plan of Treatment: You were found to have influenza A. You were treated with Tamiflu during your hospitalization. Please follow up with your primary care physician in one week to inform them of your recent hospitalization and further management of your medical conditions.      Diagnosis: Diabetes  Assessment and Plan of Treatment: Continue with your blood sugar medication. HbA1C on admission was 7.6%. This is a reflection of your blood sugar level over the last 3 months.  Please check your blood sugar levels twice daily - Morning/Afternoon, and Lunch/Bedtime the following day. Keep a record of your blood sugar readings  You must maintain a healthy diet that consists of low sugar and low fat. Your diet must consist of mostly vegetables. Please limit your intake of high sugar and high carbohydrate foods such as pasta, rice, and bread. Exercise frequently if possible. Follow up with primary care physician within one week of your discharge to further manage your diabetes and monitor your Hemoglobin A1c levels after 3 months to evaluate your diabetes control.

## 2022-11-14 NOTE — PROGRESS NOTE ADULT - SUBJECTIVE AND OBJECTIVE BOX
Patient is a 87y old  Female who presents with a chief complaint of Syncope; new onset Afib (13 Nov 2022 12:34)    pt seen in tele [ x ], reg med floor [  ], bed [x  ], chair at bedside [   ], a+o x3 [ x ], lethargic [  ],    nad [ x ]        Allergies    No Known Allergies        Vitals    T(F): 97.4 (11-14-22 @ 04:25), Max: 98.6 (11-13-22 @ 14:00)  HR: 80 (11-14-22 @ 04:25) (68 - 83)  BP: 130/74 (11-14-22 @ 04:25) (119/65 - 158/84)  RR: 18 (11-14-22 @ 04:25) (18 - 20)  SpO2: 95% (11-14-22 @ 04:25) (95% - 97%)  Wt(kg): --  CAPILLARY BLOOD GLUCOSE      POCT Blood Glucose.: 274 mg/dL (13 Nov 2022 21:52)      Labs                          12.2   7.90  )-----------( 176      ( 13 Nov 2022 07:00 )             37.7       11-13    133<L>  |  101  |  17  ----------------------------<  244<H>  4.6   |  24  |  0.85    Ca    9.5      13 Nov 2022 07:00  Phos  2.9     11-13  Mg     1.9     11-13            Troponin I, High Sensitivity Result: 7.4 ng/L (11-12-22 @ 01:22)  Troponin I, High Sensitivity Result: 8.3 ng/L (11-11-22 @ 15:50)        Radiology Results      Meds    MEDICATIONS  (STANDING):  atorvastatin 40 milliGRAM(s) Oral at bedtime  enoxaparin Injectable 60 milliGRAM(s) SubCutaneous every 12 hours  insulin lispro (ADMELOG) corrective regimen sliding scale   SubCutaneous three times a day before meals  insulin lispro (ADMELOG) corrective regimen sliding scale   SubCutaneous at bedtime  methylPREDNISolone sodium succinate Injectable 40 milliGRAM(s) IV Push every 12 hours  metoprolol tartrate 12.5 milliGRAM(s) Oral every 12 hours  oseltamivir 75 milliGRAM(s) Oral two times a day  pantoprazole    Tablet 40 milliGRAM(s) Oral before breakfast  sodium chloride 0.9%. 1000 milliLiter(s) (75 mL/Hr) IV Continuous <Continuous>      MEDICATIONS  (PRN):  acetaminophen     Tablet .. 650 milliGRAM(s) Oral every 6 hours PRN Temp greater or equal to 38C (100.4F), Mild Pain (1 - 3)  albuterol    90 MICROgram(s) HFA Inhaler 2 Puff(s) Inhalation every 6 hours PRN Shortness of Breath and/or Wheezing  guaiFENesin Oral Liquid (Sugar-Free) 200 milliGRAM(s) Oral every 6 hours PRN Cough      Physical Exam    Neuro :  no focal deficits  Respiratory: B/L wheeze  CV: RRR, S1S2, no murmurs,   Abdominal: Soft, NT, ND +BS,  Extremities: No edema, + peripheral pulses      ASSESSMENT    syncope likely cardiogenic   new afib,   flu a,   hyponatremia,  hypophosphatemia  hypomagnesemia  h/o HTN,   HLD,  DM,  CAD (coronary artery disease)      PLAN     cont tele,   aspirin, statin,   ct head with No evidence of acute intracranial hemorrhage, midline shift or CT evidence of acute territorial infarct noted    trop x2 neg noted     cardio f/u `  cont lopressor,  lovenox 1mg/kg q 12  hold xarelto   f/u tsh and ft4  cont tamiflu,   pulm f/u   start solumedrol 40 mg q12  robitussin dm prn cough   albuterol prn  pft outpt  sup mg, phos and sodium as needed  renal cons   hgba1c 7.6 noted above,   lispro ss   cont current meds    Patient is a 87y old  Female who presents with a chief complaint of Syncope; new onset Afib (13 Nov 2022 12:34)    pt seen in tele [ x ], reg med floor [  ], bed [x  ], chair at bedside [   ], a+o x3 [ x ], lethargic [  ],    nad [ x ]        Allergies    No Known Allergies        Vitals    T(F): 97.4 (11-14-22 @ 04:25), Max: 98.6 (11-13-22 @ 14:00)  HR: 80 (11-14-22 @ 04:25) (68 - 83)  BP: 130/74 (11-14-22 @ 04:25) (119/65 - 158/84)  RR: 18 (11-14-22 @ 04:25) (18 - 20)  SpO2: 95% (11-14-22 @ 04:25) (95% - 97%)  Wt(kg): --  CAPILLARY BLOOD GLUCOSE      POCT Blood Glucose.: 274 mg/dL (13 Nov 2022 21:52)      Labs                          12.2   7.90  )-----------( 176      ( 13 Nov 2022 07:00 )             37.7       11-13    133<L>  |  101  |  17  ----------------------------<  244<H>  4.6   |  24  |  0.85    Ca    9.5      13 Nov 2022 07:00  Phos  2.9     11-13  Mg     1.9     11-13    Thyroid Stimulating Hormone, Serum in AM (11.13.22 @ 07:00)   Thyroid Stimulating Hormone, Serum: 0.24     Free Thyroxine, Serum in AM (11.13.22 @ 07:00)   Free Thyroxine, Serum: 2.0        Troponin I, High Sensitivity Result: 7.4 ng/L (11-12-22 @ 01:22)  Troponin I, High Sensitivity Result: 8.3 ng/L (11-11-22 @ 15:50)    < from: Transthoracic Echocardiogram (11.12.22 @ 06:51) >  CONCLUSIONS:  1. Normal mitral valve. Moderate mitral regurgitation.  2. Normal trileaflet aortic valve. Mild aortic  regurgitation.  3. Aortic Root: 3.2 cm.  4. Severely dilated left atrium.  LA volume index = 54  cc/m2.  5. Normal Left Ventricular Systolic Function,  (EF = 55 to  60%)  6. Unable to adequately assess diastolic function due to  technical aspects of this study.  7. Normal right atrium.  8. Normal right ventricular size and systolic function  (TAPSE  1.9cm).  9. RV systolic pressure is mildly increased at  41 mm Hg.  10. There is mild tricuspid regurgitation.  11. There is trace pulmonic regurgitation.  12. Normal pericardium with no pericardial effusion.    < end of copied text >      Radiology Results      Meds    MEDICATIONS  (STANDING):  atorvastatin 40 milliGRAM(s) Oral at bedtime  enoxaparin Injectable 60 milliGRAM(s) SubCutaneous every 12 hours  insulin lispro (ADMELOG) corrective regimen sliding scale   SubCutaneous three times a day before meals  insulin lispro (ADMELOG) corrective regimen sliding scale   SubCutaneous at bedtime  methylPREDNISolone sodium succinate Injectable 40 milliGRAM(s) IV Push every 12 hours  metoprolol tartrate 12.5 milliGRAM(s) Oral every 12 hours  oseltamivir 75 milliGRAM(s) Oral two times a day  pantoprazole    Tablet 40 milliGRAM(s) Oral before breakfast  sodium chloride 0.9%. 1000 milliLiter(s) (75 mL/Hr) IV Continuous <Continuous>      MEDICATIONS  (PRN):  acetaminophen     Tablet .. 650 milliGRAM(s) Oral every 6 hours PRN Temp greater or equal to 38C (100.4F), Mild Pain (1 - 3)  albuterol    90 MICROgram(s) HFA Inhaler 2 Puff(s) Inhalation every 6 hours PRN Shortness of Breath and/or Wheezing  guaiFENesin Oral Liquid (Sugar-Free) 200 milliGRAM(s) Oral every 6 hours PRN Cough      Physical Exam    Neuro :  no focal deficits  Respiratory: cta B/L    CV: RRR, S1S2, no murmurs,   Abdominal: Soft, NT, ND +BS,  Extremities: No edema, + peripheral pulses      ASSESSMENT    syncope likely cardiogenic   new afib,   flu a,   hyponatremia,  hypophosphatemia  hypomagnesemia  h/o HTN,   HLD,  DM,  CAD (coronary artery disease)      PLAN     cont tele,   aspirin, statin,   ct head with No evidence of acute intracranial hemorrhage, midline shift or CT evidence of acute territorial infarct noted    trop x2 neg noted     cardio f/u `  echo with  EF = 55 to 60%. RV systolic pressure is mildly increased noted above  f/u stress test  cont lopressor,  lovenox 1mg/kg q 12  hold xarelto   cont tamiflu until 11/16/22,   pulm f/u   change solumedrol to prednisone 40 daily and taper by 10 mg daily  robitussin dm prn cough   albuterol prn  pft outpt  sup mg, phos and sodium as needed  renal cons   hgba1c 7.6 noted     lispro ss   tsh low and ft4 slightly elevated noted above  endo cons for hyperthyroidisn  cont current meds

## 2022-11-14 NOTE — CONSULT NOTE ADULT - PROBLEM SELECTOR RECOMMENDATION 3
decent control as out pt  on oral dm meds  a1c- 7.6  now hyperglycemic due to prednisone  start lantus 10 units  change to orals  dm meds upon d/c

## 2022-11-14 NOTE — DISCHARGE NOTE PROVIDER - HOSPITAL COURSE
87 year old F with PMH of HTN, HLD, DM, recent URI presents after two syncopal episodes admitted for syncopal workup in the setting of A flutter and influenza A+. Pt treated with his home dose norvasc, toprol, losartan and hztz. Pt with diabetes on glipizide, and farxiga at home. Pt on sliding scale during hospitalization.    Syncope  Patient presented with 2 episodes of LOC without head trauma, more likely to be syncope in the setting of new arrhythmia (aflutter) as well as acute viral URI (influenza A). Serial troponin Negative x3. EKG shows Afib. TTE - EF 55%, Severly enlarged left atrium. Orthostatic vitals Positive - grater than 20 change in systolic and > 10 change in diastolic. CT head - no acute changes. Stress test shows_____.    New onset atrial fibrillation   Patient presents with A Fib on EKG, irregularly irregular rhythm. Rate controlled, new-onset as patient does not have known history of arrhythmia. CHADSVASC 6. Patient takes metoprolol ER 50 mg qd per surescripts  Pt is on xarelto at home dosed for PAD.    Influenza A   Pt tested positive for influenza A in ED 11/11 treated with tamiflu 75bid x5 days. Pt initially with wheezing, now improved.    Hyponatremia.   Patient presents with hyponatremia to 126 on admission s/p 1 L NS in ED and fluids by EMS as well. Likely due to dehydration in the setting of acute viral URI. Resolved.    Peripheral vascular disease.   Pt has history of PVD with varicose veins b/l on exam    Patient is able to ambulate and tolerate diet prior to discharge. Patient is stable for discharge per attending and is advised to follow up with PCP as outpatient. Please refer to patient's complete medical chart with documents for a full hospital course, for this is only a brief summary.   87 year old F with PMH of HTN, HLD, DM, recent URI presents after two syncopal episodes admitted for syncopal workup in the setting of A flutter and influenza A+. Pt treated with his home dose norvasc, toprol, losartan and hztz. Pt with diabetes on glipizide, and farxiga at home. Pt on sliding scale during hospitalization.    Syncope  Patient presented with 2 episodes of LOC without head trauma, more likely to be syncope in the setting of new arrhythmia (aflutter) as well as acute viral URI (influenza A). Serial troponin Negative x3. EKG shows Afib. TTE - EF 55%, Severly enlarged left atrium. Orthostatic vitals Positive - grater than 20 change in systolic and > 10 change in diastolic. CT head - no acute changes. Stress test shows no segmental wall motion abnormalities.    New onset atrial fibrillation   Patient presents with A Fib on EKG, irregularly irregular rhythm. Rate controlled, new-onset as patient does not have known history of arrhythmia. CHADSVASC 6. Patient takes metoprolol ER 50 mg qd per surescripts  Pt is on xarelto at home dosed for PAD.    Influenza A   Pt tested positive for influenza A in ED 11/11 treated with tamiflu 75bid x5 days. Pt initially with wheezing, now improved.    Hyponatremia.   Patient presents with hyponatremia to 126 on admission s/p 1 L NS in ED and fluids by EMS as well. Likely due to dehydration in the setting of acute viral URI. Resolved.    Peripheral vascular disease.   Pt has history of PVD with varicose veins b/l on exam    Patient is able to ambulate and tolerate diet prior to discharge. Patient is stable for discharge per attending and is advised to follow up with PCP as outpatient. Please refer to patient's complete medical chart with documents for a full hospital course, for this is only a brief summary.   87 year old F with PMH of HTN, HLD, DM, recent URI presents after two syncopal episodes admitted for syncopal workup in the setting of A flutter and influenza A+. Pt treated with his home dose norvasc, toprol, losartan and hztz. Pt with diabetes on glipizide, and farxiga at home. Pt on sliding scale during hospitalization.    Syncope  Patient presented with 2 episodes of LOC without head trauma, more likely to be syncope in the setting of new arrhythmia (aflutter) as well as acute viral URI (influenza A). Serial troponin Negative x3. EKG shows Afib. TTE - EF 55%, Severly enlarged left atrium. Orthostatic vitals Positive - grater than 20 change in systolic and > 10 change in diastolic. CT head - no acute changes. Stress test shows no segmental wall motion abnormalities.    New onset atrial fibrillation   Patient presents with A Fib on EKG, irregularly irregular rhythm. Rate controlled, new-onset as patient does not have known history of arrhythmia. CHADSVASC 6. Patient takes metoprolol ER 50 mg qd per surescripts  Pt is on xarelto at home dosed for PAD.    Hyperthyroidism  Pt found to have low TSH and high free T4, started on tapazole as inpatient. Endocrine consulted. Pt to be discharged on tapazole.    Influenza A   Pt tested positive for influenza A in ED 11/11 treated with tamiflu 75bid x5 days. Pt initially with wheezing, now improved.    Hyponatremia.   Patient presents with hyponatremia to 126 on admission s/p 1 L NS in ED and fluids by EMS as well. Likely due to dehydration in the setting of acute viral URI. Resolved.    Peripheral vascular disease.   Pt has history of PVD with varicose veins b/l on exam    Patient is able to ambulate and tolerate diet prior to discharge. Patient is stable for discharge per attending and is advised to follow up with PCP as outpatient. Please refer to patient's complete medical chart with documents for a full hospital course, for this is only a brief summary.

## 2022-11-14 NOTE — DISCHARGE NOTE PROVIDER - NSDCMRMEDTOKEN_GEN_ALL_CORE_FT
acetaminophen 325 mg oral tablet: 2 tab(s) orally every 8 hours  as needed   amLODIPine 10 mg oral tablet: 1 tab(s) orally once a day  aspirin 81 mg oral delayed release tablet: 1 tab(s) orally once a day  atorvastatin 40 mg oral tablet: 1 tab(s) orally once a day  Azithromycin 5 Day Dose Pack 250 mg oral tablet: 1 tab(s) orally once a day   esomeprazole 40 mg oral delayed release capsule: 1 cap(s) orally once a day  ezetimibe 10 mg oral tablet: 1 tab(s) orally once a day  ferrous sulfate extended release: 325 milligram(s) orally  glipiZIDE 5 mg oral tablet: 1 tab(s) orally once a day  lidocaine 5% topical film: Apply topically to affected area once a day   metoprolol succinate 50 mg oral capsule, extended release: 1 cap(s) orally once a day  out patient physical therapy :   traMADol 50 mg oral tablet: 0.5 tab(s) orally every 8 hours, As needed, Severe Pain (7 - 10) MDD:75mg  Xarelto 2.5 mg oral tablet: 1 tab(s) orally 2 times a day   acetaminophen 325 mg oral tablet: 2 tab(s) orally every 8 hours  as needed   Advair Diskus 250 mcg-50 mcg inhalation powder: 1 puff(s) inhaled 2 times a day  amLODIPine 10 mg oral tablet: 1 tab(s) orally once a day  aspirin 81 mg oral delayed release tablet: 1 tab(s) orally once a day  atorvastatin 40 mg oral tablet: 1 tab(s) orally once a day  Azithromycin 5 Day Dose Pack 250 mg oral tablet: 1 tab(s) orally once a day   esomeprazole 40 mg oral delayed release capsule: 1 cap(s) orally once a day  ezetimibe 10 mg oral tablet: 1 tab(s) orally once a day  ferrous sulfate extended release: 325 milligram(s) orally  glipiZIDE 5 mg oral tablet: 1 tab(s) orally 2 times a day  hydroCHLOROthiazide 25 mg oral tablet: 1 tab(s) orally once a day  lidocaine 5% topical film: Apply topically to affected area once a day   loratadine 10 mg oral tablet: 1 tab(s) orally once a day  losartan 25 mg oral tablet: 1 tab(s) orally once a day  meloxicam 15 mg oral tablet: 1 tab(s) orally once a day  metFORMIN 1000 mg oral tablet: 1 tab(s) orally 2 times a day  metoprolol succinate 50 mg oral capsule, extended release: 1 cap(s) orally once a day  out patient physical therapy :   traMADol 50 mg oral tablet: 0.5 tab(s) orally every 8 hours, As needed, Severe Pain (7 - 10) MDD:75mg  Xarelto 2.5 mg oral tablet: 1 tab(s) orally 2 times a day   acetaminophen 325 mg oral tablet: 2 tab(s) orally every 8 hours  as needed   Advair Diskus 250 mcg-50 mcg inhalation powder: 1 puff(s) inhaled 2 times a day  amLODIPine 10 mg oral tablet: 1 tab(s) orally once a day  aspirin 81 mg oral delayed release tablet: 1 tab(s) orally once a day  atorvastatin 40 mg oral tablet: 1 tab(s) orally once a day  Eliquis 2.5 mg oral tablet: 1 tab(s) orally 2 times a day   esomeprazole 40 mg oral delayed release capsule: 1 cap(s) orally once a day  ezetimibe 10 mg oral tablet: 1 tab(s) orally once a day  ferrous sulfate extended release: 325 milligram(s) orally  glipiZIDE 5 mg oral tablet: 1 tab(s) orally 2 times a day  hydroCHLOROthiazide 25 mg oral tablet: 1 tab(s) orally once a day  lidocaine 5% topical film: Apply topically to affected area once a day   loratadine 10 mg oral tablet: 1 tab(s) orally once a day  losartan 25 mg oral tablet: 1 tab(s) orally once a day  meloxicam 15 mg oral tablet: 1 tab(s) orally once a day  metFORMIN 1000 mg oral tablet: 1 tab(s) orally 2 times a day  methIMAzole 10 mg oral tablet: 1 tab(s) orally once a day  metoprolol succinate 50 mg oral capsule, extended release: 1 cap(s) orally once a day  predniSONE 10 mg oral tablet: 1 tab(s) orally once a day  traMADol 50 mg oral tablet: 0.5 tab(s) orally every 8 hours, As needed, Severe Pain (7 - 10) MDD:75mg

## 2022-11-14 NOTE — PROGRESS NOTE ADULT - PROBLEM SELECTOR PLAN 7
- patient takes glipizide 5 mg ER qd, farxiga 10 mg qd per surescripts  - insulin sliding scale acqhs  - monitor glucose

## 2022-11-14 NOTE — PROGRESS NOTE ADULT - PROBLEM SELECTOR PLAN 4
- patient presents with hyponatremia to 126 on admission s/p 1 L NS in ED and fluids by EMS as well  - likely due to dehydration in the setting of acute viral URI  - Increase rate of IV NS to 75cc/hr  - monitor Na  - consider further workup of hyponatremia if no improvement with IVF - patient presents with hyponatremia to 126 on admission s/p 1 L NS in ED and fluids by EMS as well  - likely due to dehydration in the setting of acute viral URI  - Increase rate of IV NS to 75cc/hr  - monitor Na  - consider further workup of hyponatremia if no improvement with IVF  - Resolved

## 2022-11-14 NOTE — PROGRESS NOTE ADULT - PROBLEM SELECTOR PLAN 1
- patient presents with 2 episodes of LOC without head trauma, more likely to be syncope in the setting of new arrhythmia (aflutter) as well as acute viral URI (influenza A)  - telemetry monitoring  - serial troponin Negative x3  - EKG - A. Fib  - F/U TTE  - Cardiology consult, Dr. Rivera  - Orthostatic vitals Positive - grater than 20 change in systolic and > 10 change in diastolic  - CT head - no acute changes - patient presents with 2 episodes of LOC without head trauma, more likely to be syncope in the setting of new arrhythmia (aflutter) as well as acute viral URI (influenza A)  - telemetry monitoring  - serial troponin Negative x3  - EKG - A. Fib  - TTE - EF 55%, Severly enlarged left atrium  - Orthostatic vitals Positive - grater than 20 change in systolic and > 10 change in diastolic  - CT head - no acute changes  - F/U Stress test this AM  - Cardiology consult, Dr. Rivera

## 2022-11-14 NOTE — CONSULT NOTE ADULT - PROBLEM SELECTOR RECOMMENDATION 4
? due to hyperthyroidism- start tapazole 10mg after repeat labs  tx per cardio
analgesics prn  gentle hydration  Tamiflu 75 mgs po BID for 5 days

## 2022-11-14 NOTE — DISCHARGE NOTE PROVIDER - CARE PROVIDER_API CALL
Tushar Recinos  INTERNAL MEDICINE  [address] [place] [address]  Phone: [phone]  Fax: [phone]  Follow Up Time: 2 weeks   Tushar Recinos  INTERNAL MEDICINE  [address] [place] [address]  Phone: [phone]  Fax: [phone]  Follow Up Time: 2 weeks    Mel Herrera)  EndocrinologyMetabDiabetes  [address] [place] [address]  Phone: [phone]  Fax: [phone]  Follow Up Time: 1 month

## 2022-11-14 NOTE — CONSULT NOTE ADULT - PROBLEM SELECTOR RECOMMENDATION 2
control VR  Tele monitoring  R/o ACS protocol  Cardio eval  ASA, BB, Statin
w/u per cardio   stress test today

## 2022-11-15 LAB
ANION GAP SERPL CALC-SCNC: 9 MMOL/L — SIGNIFICANT CHANGE UP (ref 5–17)
BUN SERPL-MCNC: 18 MG/DL — SIGNIFICANT CHANGE UP (ref 7–18)
CALCIUM SERPL-MCNC: 8.9 MG/DL — SIGNIFICANT CHANGE UP (ref 8.4–10.5)
CHLORIDE SERPL-SCNC: 106 MMOL/L — SIGNIFICANT CHANGE UP (ref 96–108)
CO2 SERPL-SCNC: 23 MMOL/L — SIGNIFICANT CHANGE UP (ref 22–31)
CREAT SERPL-MCNC: 0.77 MG/DL — SIGNIFICANT CHANGE UP (ref 0.5–1.3)
EGFR: 75 ML/MIN/1.73M2 — SIGNIFICANT CHANGE UP
GLUCOSE BLDC GLUCOMTR-MCNC: 205 MG/DL — HIGH (ref 70–99)
GLUCOSE BLDC GLUCOMTR-MCNC: 253 MG/DL — HIGH (ref 70–99)
GLUCOSE BLDC GLUCOMTR-MCNC: 271 MG/DL — HIGH (ref 70–99)
GLUCOSE BLDC GLUCOMTR-MCNC: 294 MG/DL — HIGH (ref 70–99)
GLUCOSE SERPL-MCNC: 216 MG/DL — HIGH (ref 70–99)
HCT VFR BLD CALC: 33.1 % — LOW (ref 34.5–45)
HGB BLD-MCNC: 10.6 G/DL — LOW (ref 11.5–15.5)
MAGNESIUM SERPL-MCNC: 2.2 MG/DL — SIGNIFICANT CHANGE UP (ref 1.6–2.6)
MCHC RBC-ENTMCNC: 26.5 PG — LOW (ref 27–34)
MCHC RBC-ENTMCNC: 32 GM/DL — SIGNIFICANT CHANGE UP (ref 32–36)
MCV RBC AUTO: 82.8 FL — SIGNIFICANT CHANGE UP (ref 80–100)
NRBC # BLD: 0 /100 WBCS — SIGNIFICANT CHANGE UP (ref 0–0)
PHOSPHATE SERPL-MCNC: 1.4 MG/DL — LOW (ref 2.5–4.5)
PLATELET # BLD AUTO: 192 K/UL — SIGNIFICANT CHANGE UP (ref 150–400)
POTASSIUM SERPL-MCNC: 3.8 MMOL/L — SIGNIFICANT CHANGE UP (ref 3.5–5.3)
POTASSIUM SERPL-SCNC: 3.8 MMOL/L — SIGNIFICANT CHANGE UP (ref 3.5–5.3)
RBC # BLD: 4 M/UL — SIGNIFICANT CHANGE UP (ref 3.8–5.2)
RBC # FLD: 14.7 % — HIGH (ref 10.3–14.5)
SODIUM SERPL-SCNC: 138 MMOL/L — SIGNIFICANT CHANGE UP (ref 135–145)
TSH SERPL-MCNC: 0.14 UU/ML — LOW (ref 0.34–4.82)
WBC # BLD: 8.05 K/UL — SIGNIFICANT CHANGE UP (ref 3.8–10.5)
WBC # FLD AUTO: 8.05 K/UL — SIGNIFICANT CHANGE UP (ref 3.8–10.5)

## 2022-11-15 RX ORDER — APIXABAN 2.5 MG/1
1 TABLET, FILM COATED ORAL
Qty: 60 | Refills: 0
Start: 2022-11-15 | End: 2022-12-14

## 2022-11-15 RX ORDER — SODIUM CHLORIDE 0.65 %
1 AEROSOL, SPRAY (ML) NASAL
Refills: 0 | Status: DISCONTINUED | OUTPATIENT
Start: 2022-11-15 | End: 2022-11-17

## 2022-11-15 RX ORDER — POTASSIUM PHOSPHATE, MONOBASIC POTASSIUM PHOSPHATE, DIBASIC 236; 224 MG/ML; MG/ML
30 INJECTION, SOLUTION INTRAVENOUS ONCE
Refills: 0 | Status: COMPLETED | OUTPATIENT
Start: 2022-11-15 | End: 2022-11-15

## 2022-11-15 RX ADMIN — Medication 40 MILLIGRAM(S): at 05:39

## 2022-11-15 RX ADMIN — Medication 650 MILLIGRAM(S): at 10:10

## 2022-11-15 RX ADMIN — ENOXAPARIN SODIUM 60 MILLIGRAM(S): 100 INJECTION SUBCUTANEOUS at 17:14

## 2022-11-15 RX ADMIN — Medication 3: at 17:09

## 2022-11-15 RX ADMIN — SODIUM CHLORIDE 75 MILLILITER(S): 9 INJECTION INTRAMUSCULAR; INTRAVENOUS; SUBCUTANEOUS at 05:39

## 2022-11-15 RX ADMIN — SODIUM CHLORIDE 75 MILLILITER(S): 9 INJECTION INTRAMUSCULAR; INTRAVENOUS; SUBCUTANEOUS at 21:00

## 2022-11-15 RX ADMIN — ALBUTEROL 2 PUFF(S): 90 AEROSOL, METERED ORAL at 18:50

## 2022-11-15 RX ADMIN — Medication 650 MILLIGRAM(S): at 12:56

## 2022-11-15 RX ADMIN — Medication 1: at 22:40

## 2022-11-15 RX ADMIN — Medication 2: at 08:00

## 2022-11-15 RX ADMIN — Medication 3: at 11:56

## 2022-11-15 RX ADMIN — ATORVASTATIN CALCIUM 40 MILLIGRAM(S): 80 TABLET, FILM COATED ORAL at 21:01

## 2022-11-15 RX ADMIN — Medication 650 MILLIGRAM(S): at 22:12

## 2022-11-15 RX ADMIN — PANTOPRAZOLE SODIUM 40 MILLIGRAM(S): 20 TABLET, DELAYED RELEASE ORAL at 06:10

## 2022-11-15 RX ADMIN — Medication 100 MILLIGRAM(S): at 18:51

## 2022-11-15 RX ADMIN — Medication 12.5 MILLIGRAM(S): at 17:14

## 2022-11-15 RX ADMIN — Medication 75 MILLIGRAM(S): at 05:39

## 2022-11-15 RX ADMIN — POTASSIUM PHOSPHATE, MONOBASIC POTASSIUM PHOSPHATE, DIBASIC 83.33 MILLIMOLE(S): 236; 224 INJECTION, SOLUTION INTRAVENOUS at 10:11

## 2022-11-15 RX ADMIN — Medication 100 MILLIGRAM(S): at 21:00

## 2022-11-15 RX ADMIN — Medication 12.5 MILLIGRAM(S): at 05:39

## 2022-11-15 RX ADMIN — Medication 650 MILLIGRAM(S): at 21:00

## 2022-11-15 RX ADMIN — ENOXAPARIN SODIUM 60 MILLIGRAM(S): 100 INJECTION SUBCUTANEOUS at 05:38

## 2022-11-15 RX ADMIN — Medication 1 SPRAY(S): at 17:15

## 2022-11-15 RX ADMIN — Medication 75 MILLIGRAM(S): at 17:14

## 2022-11-15 RX ADMIN — ALBUTEROL 2 PUFF(S): 90 AEROSOL, METERED ORAL at 12:57

## 2022-11-15 NOTE — PROGRESS NOTE ADULT - SUBJECTIVE AND OBJECTIVE BOX
Patient is a 87y old  Female who presents with a chief complaint of Syncope; new onset Afib (14 Nov 2022 10:43)    pt seen in tele [ x ], reg med floor [  ], bed [x  ], chair at bedside [   ], a+o x3 [ x ], lethargic [  ],    nad [ x ]        Allergies    No Known Allergies        Vitals    T(F): 98.5 (11-15-22 @ 05:01), Max: 98.7 (11-14-22 @ 21:08)  HR: 89 (11-15-22 @ 05:01) (81 - 89)  BP: 168/74 (11-15-22 @ 05:01) (145/75 - 169/88)  RR: 16 (11-15-22 @ 05:01) (16 - 18)  SpO2: 97% (11-15-22 @ 05:01) (96% - 97%)  Wt(kg): --  CAPILLARY BLOOD GLUCOSE      POCT Blood Glucose.: 271 mg/dL (14 Nov 2022 22:15)      Labs                          10.6   6.18  )-----------( 178      ( 14 Nov 2022 06:25 )             32.5       11-14    136  |  104  |  20<H>  ----------------------------<  250<H>  4.2   |  23  |  0.76    Ca    9.0      14 Nov 2022 06:25  Phos  2.1     11-14  Mg     2.2     11-14                  Radiology Results      Meds    MEDICATIONS  (STANDING):  atorvastatin 40 milliGRAM(s) Oral at bedtime  enoxaparin Injectable 60 milliGRAM(s) SubCutaneous every 12 hours  insulin lispro (ADMELOG) corrective regimen sliding scale   SubCutaneous three times a day before meals  insulin lispro (ADMELOG) corrective regimen sliding scale   SubCutaneous at bedtime  metoprolol tartrate 12.5 milliGRAM(s) Oral every 12 hours  oseltamivir 75 milliGRAM(s) Oral two times a day  pantoprazole    Tablet 40 milliGRAM(s) Oral before breakfast  predniSONE   Tablet 40 milliGRAM(s) Oral daily  sodium chloride 0.9%. 1000 milliLiter(s) (75 mL/Hr) IV Continuous <Continuous>      MEDICATIONS  (PRN):  acetaminophen     Tablet .. 650 milliGRAM(s) Oral every 6 hours PRN Temp greater or equal to 38C (100.4F), Mild Pain (1 - 3)  albuterol    90 MICROgram(s) HFA Inhaler 2 Puff(s) Inhalation every 6 hours PRN Shortness of Breath and/or Wheezing  guaiFENesin Oral Liquid (Sugar-Free) 200 milliGRAM(s) Oral every 6 hours PRN Cough      Physical Exam    Neuro :  no focal deficits  Respiratory: cta B/L    CV: RRR, S1S2, no murmurs,   Abdominal: Soft, NT, ND +BS,  Extremities: No edema, + peripheral pulses      ASSESSMENT    syncope likely cardiogenic   new afib,   flu a,   hyponatremia,  hypophosphatemia  hypomagnesemia  h/o HTN,   HLD,  DM,  CAD (coronary artery disease)      PLAN     cont tele,   aspirin, statin,   ct head with No evidence of acute intracranial hemorrhage, midline shift or CT evidence of acute territorial infarct noted    trop x2 neg noted     cardio f/u `  echo with  EF = 55 to 60%. RV systolic pressure is mildly increased noted above  f/u stress test  cont lopressor,  lovenox 1mg/kg q 12  hold xarelto   cont tamiflu until 11/16/22,   pulm f/u   change solumedrol to prednisone 40 daily and taper by 10 mg daily  robitussin dm prn cough   albuterol prn  pft outpt  sup mg, phos and sodium as needed  renal cons   hgba1c 7.6 noted     lispro ss   tsh low and ft4 slightly elevated noted above  endo cons for hyperthyroidisn  cont current meds        Patient is a 87y old  Female who presents with a chief complaint of Syncope; new onset Afib (14 Nov 2022 10:43)    pt seen in tele [ x ], reg med floor [  ], bed [x  ], chair at bedside [   ], a+o x3 [ x ], lethargic [  ],    nad [ x ]        Allergies    No Known Allergies        Vitals    T(F): 98.5 (11-15-22 @ 05:01), Max: 98.7 (11-14-22 @ 21:08)  HR: 89 (11-15-22 @ 05:01) (81 - 89)  BP: 168/74 (11-15-22 @ 05:01) (145/75 - 169/88)  RR: 16 (11-15-22 @ 05:01) (16 - 18)  SpO2: 97% (11-15-22 @ 05:01) (96% - 97%)  Wt(kg): --  CAPILLARY BLOOD GLUCOSE      POCT Blood Glucose.: 271 mg/dL (14 Nov 2022 22:15)      Labs                          10.6   6.18  )-----------( 178      ( 14 Nov 2022 06:25 )             32.5       11-14    136  |  104  |  20<H>  ----------------------------<  250<H>  4.2   |  23  |  0.76    Ca    9.0      14 Nov 2022 06:25  Phos  2.1     11-14  Mg     2.2     11-14            < from: Nuclear Stress Test-Pharmacologic (11.14.22 @ 05:30) >  IMPRESSIONS:Normal Study  * Negative ECG evidence of ischemia after IV of Lexiscan.  * Review of raw data shows: The study is of good technical  quality.  * The left ventricle was normal in size. Normal myocardial  perfusion scan, with no evidence of infarction or  inducible ischemia.  * Post-stress resting myocardial perfusion gated SPECT  imaging was performed (LVEF > 70%)    < end of copied text >        Radiology Results      Meds    MEDICATIONS  (STANDING):  atorvastatin 40 milliGRAM(s) Oral at bedtime  enoxaparin Injectable 60 milliGRAM(s) SubCutaneous every 12 hours  insulin lispro (ADMELOG) corrective regimen sliding scale   SubCutaneous three times a day before meals  insulin lispro (ADMELOG) corrective regimen sliding scale   SubCutaneous at bedtime  metoprolol tartrate 12.5 milliGRAM(s) Oral every 12 hours  oseltamivir 75 milliGRAM(s) Oral two times a day  pantoprazole    Tablet 40 milliGRAM(s) Oral before breakfast  predniSONE   Tablet 40 milliGRAM(s) Oral daily  sodium chloride 0.9%. 1000 milliLiter(s) (75 mL/Hr) IV Continuous <Continuous>      MEDICATIONS  (PRN):  acetaminophen     Tablet .. 650 milliGRAM(s) Oral every 6 hours PRN Temp greater or equal to 38C (100.4F), Mild Pain (1 - 3)  albuterol    90 MICROgram(s) HFA Inhaler 2 Puff(s) Inhalation every 6 hours PRN Shortness of Breath and/or Wheezing  guaiFENesin Oral Liquid (Sugar-Free) 200 milliGRAM(s) Oral every 6 hours PRN Cough      Physical Exam    Neuro :  no focal deficits  Respiratory: cta B/L    CV: RRR, S1S2, no murmurs,   Abdominal: Soft, NT, ND +BS,  Extremities: No edema, + peripheral pulses      ASSESSMENT    syncope likely cardiogenic   new afib,   flu a,   hyponatremia,  hypophosphatemia  hypomagnesemia   hyperthyroidism  h/o HTN,   HLD,  DM,  CAD (coronary artery disease)      PLAN     cont tele,   aspirin, statin,   ct head with No evidence of acute intracranial hemorrhage, midline shift or CT evidence of acute territorial infarct noted    trop x2 neg noted     cardio f/u `  echo with  EF = 55 to 60%. RV systolic pressure is mildly increased noted    stress test neg noted above  cont lopressor,  change lovenox to eliquis 2.5mg bid  cont tamiflu until 11/16/22,   pulm f/u   change solumedrol to prednisone 40 daily and taper by 10 mg daily  robitussin dm prn cough   albuterol prn  pft outpt  sup phos   hgba1c 7.6 noted     now hyperglycemic due to prednisone  start lantus 10 units  change to orals  dm meds upon d/c  tsh low and ft4 slightly elevated noted above  endo f/u  consider tapazole 10mg qd  rept tsh  cont current meds   d/c plan for am if stable       Patient is a 87y old  Female who presents with a chief complaint of Syncope; new onset Afib (14 Nov 2022 10:43)    pt seen in tele [ x ], reg med floor [  ], bed [x  ], chair at bedside [   ], a+o x3 [ x ], lethargic [  ],    nad [ x ]        Allergies    No Known Allergies        Vitals    T(F): 98.5 (11-15-22 @ 05:01), Max: 98.7 (11-14-22 @ 21:08)  HR: 89 (11-15-22 @ 05:01) (81 - 89)  BP: 168/74 (11-15-22 @ 05:01) (145/75 - 169/88)  RR: 16 (11-15-22 @ 05:01) (16 - 18)  SpO2: 97% (11-15-22 @ 05:01) (96% - 97%)  Wt(kg): --  CAPILLARY BLOOD GLUCOSE      POCT Blood Glucose.: 271 mg/dL (14 Nov 2022 22:15)      Labs                          10.6   6.18  )-----------( 178      ( 14 Nov 2022 06:25 )             32.5       11-14    136  |  104  |  20<H>  ----------------------------<  250<H>  4.2   |  23  |  0.76    Ca    9.0      14 Nov 2022 06:25  Phos  2.1     11-14  Mg     2.2     11-14            < from: Nuclear Stress Test-Pharmacologic (11.14.22 @ 05:30) >  IMPRESSIONS:Normal Study  * Negative ECG evidence of ischemia after IV of Lexiscan.  * Review of raw data shows: The study is of good technical  quality.  * The left ventricle was normal in size. Normal myocardial  perfusion scan, with no evidence of infarction or  inducible ischemia.  * Post-stress resting myocardial perfusion gated SPECT  imaging was performed (LVEF > 70%)    < end of copied text >        Radiology Results      Meds    MEDICATIONS  (STANDING):  atorvastatin 40 milliGRAM(s) Oral at bedtime  enoxaparin Injectable 60 milliGRAM(s) SubCutaneous every 12 hours  insulin lispro (ADMELOG) corrective regimen sliding scale   SubCutaneous three times a day before meals  insulin lispro (ADMELOG) corrective regimen sliding scale   SubCutaneous at bedtime  metoprolol tartrate 12.5 milliGRAM(s) Oral every 12 hours  oseltamivir 75 milliGRAM(s) Oral two times a day  pantoprazole    Tablet 40 milliGRAM(s) Oral before breakfast  predniSONE   Tablet 40 milliGRAM(s) Oral daily  sodium chloride 0.9%. 1000 milliLiter(s) (75 mL/Hr) IV Continuous <Continuous>      MEDICATIONS  (PRN):  acetaminophen     Tablet .. 650 milliGRAM(s) Oral every 6 hours PRN Temp greater or equal to 38C (100.4F), Mild Pain (1 - 3)  albuterol    90 MICROgram(s) HFA Inhaler 2 Puff(s) Inhalation every 6 hours PRN Shortness of Breath and/or Wheezing  guaiFENesin Oral Liquid (Sugar-Free) 200 milliGRAM(s) Oral every 6 hours PRN Cough      Physical Exam    Neuro :  no focal deficits  Respiratory: cta B/L    CV: RRR, S1S2, no murmurs,   Abdominal: Soft, NT, ND +BS,  Extremities: No edema, + peripheral pulses      ASSESSMENT    syncope likely cardiogenic   new afib,   flu a,   hyponatremia,  hypophosphatemia  hypomagnesemia   hyperthyroidism  h/o HTN,   HLD,  DM,  CAD (coronary artery disease)      PLAN     cont tele,   aspirin, statin,   ct head with No evidence of acute intracranial hemorrhage, midline shift or CT evidence of acute territorial infarct noted    trop x2 neg noted     cardio f/u `  echo with  EF = 55 to 60%. RV systolic pressure is mildly increased noted    stress test neg noted above  cont lopressor,  change lovenox to eliquis 2.5mg bid  cont tamiflu until 11/16/22,   pulm f/u   cont prednisone 40 daily and taper by 10 mg daily  robitussin dm prn cough   albuterol prn  pft outpt  sup phos   hgba1c 7.6 noted     now hyperglycemic due to prednisone  start lantus 10 units  change to orals  dm meds upon d/c  tsh low and ft4 slightly elevated noted above  endo f/u  consider tapazole 10mg qd  rept tsh  cont current meds   d/c planing

## 2022-11-15 NOTE — PROGRESS NOTE ADULT - SUBJECTIVE AND OBJECTIVE BOX
CHIEF COMPLAINT:Patient is a 87y old  Female who presents with a chief complaint of Syncope; new onset Afib.Pt appears comfortable.    	  REVIEW OF SYSTEMS:  CONSTITUTIONAL: No fever, weight loss, or fatigue  EYES: No eye pain, visual disturbances, or discharge  ENT:  No difficulty hearing, tinnitus, vertigo; No sinus or throat pain  NECK: No pain or stiffness  RESPIRATORY: No cough, wheezing, chills or hemoptysis; No Shortness of Breath  CARDIOVASCULAR: No chest pain, palpitations, passing out, dizziness, or leg swelling  GASTROINTESTINAL: No abdominal or epigastric pain. No nausea, vomiting, or hematemesis; No diarrhea or constipation. No melena or hematochezia.  GENITOURINARY: No dysuria, frequency, hematuria, or incontinence  NEUROLOGICAL: No headaches, memory loss, loss of strength, numbness, or tremors  SKIN: No itching, burning, rashes, or lesions   LYMPH Nodes: No enlarged glands  ENDOCRINE: No heat or cold intolerance; No hair loss  MUSCULOSKELETAL: No joint pain or swelling; No muscle, back, or extremity pain  PSYCHIATRIC: No depression, anxiety, mood swings, or difficulty sleeping  HEME/LYMPH: No easy bruising, or bleeding gums  ALLERGY AND IMMUNOLOGIC: No hives or eczema	    PHYSICAL EXAM:  T(C): 36.9 (11-15-22 @ 09:46), Max: 37.1 (11-14-22 @ 21:08)  HR: 77 (11-15-22 @ 09:46) (77 - 89)  BP: 156/88 (11-15-22 @ 09:46) (145/75 - 169/88)  RR: 18 (11-15-22 @ 09:46) (16 - 18)  SpO2: 97% (11-15-22 @ 09:46) (96% - 97%)  Wt(kg): --  I&O's Summary      Appearance: Normal	  HEENT:   Normal oral mucosa, PERRL, EOMI	  Lymphatic: No lymphadenopathy  Cardiovascular: Normal S1 S2, No JVD, No murmurs, No edema  Respiratory: Lungs clear to auscultation	  Psychiatry: A & O x 3, Mood & affect appropriate  Gastrointestinal:  Soft, Non-tender, + BS	  Skin: No rashes, No ecchymoses, No cyanosis	  Neurologic: Non-focal  Extremities: Normal range of motion, No clubbing, cyanosis or edema  Vascular: Peripheral pulses palpable 2+ bilaterally    MEDICATIONS  (STANDING):  atorvastatin 40 milliGRAM(s) Oral at bedtime  enoxaparin Injectable 60 milliGRAM(s) SubCutaneous every 12 hours  insulin lispro (ADMELOG) corrective regimen sliding scale   SubCutaneous three times a day before meals  insulin lispro (ADMELOG) corrective regimen sliding scale   SubCutaneous at bedtime  metoprolol tartrate 12.5 milliGRAM(s) Oral every 12 hours  oseltamivir 75 milliGRAM(s) Oral two times a day  pantoprazole    Tablet 40 milliGRAM(s) Oral before breakfast  predniSONE   Tablet 40 milliGRAM(s) Oral daily  sodium chloride 0.9%. 1000 milliLiter(s) (75 mL/Hr) IV Continuous <Continuous>      TELEMETRY:afib 60-80's 	    	  	  LABS:	 	                       10.6   8.05  )-----------( 192      ( 15 Nov 2022 06:30 )             33.1     11-15    138  |  106  |  18  ----------------------------<  216<H>  3.8   |  23  |  0.77    Ca    8.9      15 Nov 2022 06:30  Phos  1.4     11-15  Mg     2.2     11-15      proBNP: Serum Pro-Brain Natriuretic Peptide: 352 pg/mL (11-11 @ 15:50)    Lipid Profile:   HgA1c:   TSH: Thyroid Stimulating Hormone, Serum: 0.14 uU/mL (11-15 @ 06:30)  Thyroid Stimulating Hormone, Serum: 0.24 uU/mL (11-13 @ 07:00)      	    < from: Nuclear Stress Test-Pharmacologic (11.14.22 @ 05:30) >  IMPRESSIONS:Normal Study  * Negative ECG evidence of ischemia after IV of Lexiscan.  * Review of raw data shows: The study is of good technical  quality.  * The left ventricle was normal in size. Normal myocardial  perfusion scan, with no evidence of infarction or  inducible ischemia.  * Post-stress resting myocardial perfusion gated SPECT  imaging was performed (LVEF > 70%)      < end of copied text >

## 2022-11-15 NOTE — PROGRESS NOTE ADULT - PROBLEM SELECTOR PLAN 6
- patient takes metoprolol ER 50 mg qd, HCTZ 25 mg qd, losartan 25 mg qd, amlodipine 10 mg  - continue with low dose lopressor twice daily with holding parameters  - monitor BP and resume meds as above as needed

## 2022-11-15 NOTE — PROGRESS NOTE ADULT - PROBLEM SELECTOR PLAN 3
- Pt tested positive for influenza A in ED 11/11  - Continue tamiflu 75 mg bid for 5 days total (day 3)  - prn acetaminophen for fever/headache  - droplet isolation  - Wheezing improving, not requiring Supplemental O2 - patient presents with 2 episodes of LOC without head trauma, more likely to be syncope in the setting of new arrhythmia (aflutter) as well as acute viral URI (influenza A)  - telemetry monitoring  - serial troponin Negative x3  - EKG - A. Fib  - TTE - EF 55%, Severly enlarged left atrium  - CT head - no acute changes  -  Stress test - Negative for reversible ischemia  - Cardiology consult, Dr. Rivera

## 2022-11-15 NOTE — PROGRESS NOTE ADULT - PROBLEM SELECTOR PLAN 5
- patient has history of PVD with varicose veins b/l on exam  - takes ezetimibe 10 mg qd and xarelto 2.5   - Lovenox while in hospital  - D/C Home on xarelto 20mg - patient has history of PVD with varicose veins b/l on exam  - takes ezetimibe 10 mg qd and xarelto 2.5   - Lovenox while in hospital  - D/C Home on xarelto 15mg - patient has history of PVD with varicose veins b/l on exam  - takes ezetimibe 10 mg qd and xarelto 2.5   - Lovenox while in hospital  - D/C Home on Eliquis 2.5mg bid

## 2022-11-15 NOTE — PROGRESS NOTE ADULT - PROBLEM SELECTOR PLAN 2
- patient presents with A Fib on EKG, irregularly irregular rhythm  - rate controlled, new-onset as patient does not have known history of arrhythmia  - CHADSVASC 6  - continue with low dose lopressor twice daily for rate control, increase as tolerated/necessary  - Patient on xarelto 2.5 at home, will D/C home on 20mg given new A. Fib  - CT head - no acute changes  - F/U Stress test this AM  - TTE - EF 55%, Severly enlarged left atrium - patient presents with A Fib on EKG, irregularly irregular rhythm  - rate controlled, new-onset as patient does not have known history of arrhythmia  - CHADSVASC 6  - continue with low dose lopressor twice daily for rate control, increase as tolerated/necessary  - Patient on xarelto 2.5 at home, will D/C home on 15mg given new A. Fib  - CT head - no acute changes  - F/U Stress test this AM  - TTE - EF 55%, Severly enlarged left atrium - patient presents with A Fib on EKG, irregularly irregular rhythm  - rate controlled, new-onset as patient does not have known history of arrhythmia  - CHADSVASC 6  - continue with low dose lopressor twice daily for rate control, increase as tolerated/necessary  - Patient on xarelto 2.5 at home  - CT head - no acute changes  - Stress test - Negative for reversible signs of ischemia   - TTE - EF 55%, Severly enlarged left atrium  - D/C Home on Eliquis 2.5mg bid

## 2022-11-15 NOTE — PROGRESS NOTE ADULT - SUBJECTIVE AND OBJECTIVE BOX
Patient is a 87y old  Female who presents with a chief complaint of Syncope; new onset Afib (15 Nov 2022 09:48)  Awake, alert, comfortable in bed in NAD    INTERVAL HPI/OVERNIGHT EVENTS:      VITAL SIGNS:  T(F): 98.5 (11-15-22 @ 09:46)  HR: 77 (11-15-22 @ 09:46)  BP: 156/88 (11-15-22 @ 09:46)  RR: 18 (11-15-22 @ 09:46)  SpO2: 97% (11-15-22 @ 09:46)  Wt(kg): --  I&O's Detail          REVIEW OF SYSTEMS:    CONSTITUTIONAL:  No fevers, chills, sweats    HEENT:  Eyes:  No diplopia or blurred vision. ENT:  No earache, sore throat or runny nose.    CARDIOVASCULAR:  No pressure, squeezing, tightness, or heaviness about the chest; no palpitations.    RESPIRATORY:  Per HPI    GASTROINTESTINAL:  No abdominal pain, nausea, vomiting or diarrhea.    GENITOURINARY:  No dysuria, frequency or urgency.    NEUROLOGIC:  No paresthesias, fasciculations, seizures or weakness.    PSYCHIATRIC:  No disorder of thought or mood.      PHYSICAL EXAM:    Constitutional: Well developed and nourished  Eyes:Perrla  ENMT: normal  Neck:supple  Respiratory: Fine wheezes B/L  Cardiovascular: S1 S2 regular  Gastrointestinal: Soft, Non tender  Extremities: No edema  Vascular:normal  Neurological:Awake, alert,Ox3  Musculoskeletal:Normal      MEDICATIONS  (STANDING):  atorvastatin 40 milliGRAM(s) Oral at bedtime  enoxaparin Injectable 60 milliGRAM(s) SubCutaneous every 12 hours  insulin lispro (ADMELOG) corrective regimen sliding scale   SubCutaneous three times a day before meals  insulin lispro (ADMELOG) corrective regimen sliding scale   SubCutaneous at bedtime  metoprolol tartrate 12.5 milliGRAM(s) Oral every 12 hours  oseltamivir 75 milliGRAM(s) Oral two times a day  pantoprazole    Tablet 40 milliGRAM(s) Oral before breakfast  predniSONE   Tablet 40 milliGRAM(s) Oral daily  sodium chloride 0.9%. 1000 milliLiter(s) (75 mL/Hr) IV Continuous <Continuous>    MEDICATIONS  (PRN):  acetaminophen     Tablet .. 650 milliGRAM(s) Oral every 6 hours PRN Temp greater or equal to 38C (100.4F), Mild Pain (1 - 3)  albuterol    90 MICROgram(s) HFA Inhaler 2 Puff(s) Inhalation every 6 hours PRN Shortness of Breath and/or Wheezing  guaiFENesin Oral Liquid (Sugar-Free) 200 milliGRAM(s) Oral every 6 hours PRN Cough      Allergies    No Known Allergies    Intolerances        LABS:                        10.6   8.05  )-----------( 192      ( 15 Nov 2022 06:30 )             33.1     11-15    138  |  106  |  18  ----------------------------<  216<H>  3.8   |  23  |  0.77    Ca    8.9      15 Nov 2022 06:30  Phos  1.4     11-15  Mg     2.2     11-15                CAPILLARY BLOOD GLUCOSE      POCT Blood Glucose.: 205 mg/dL (15 Nov 2022 07:40)  POCT Blood Glucose.: 271 mg/dL (14 Nov 2022 22:15)  POCT Blood Glucose.: 301 mg/dL (14 Nov 2022 17:19)  POCT Blood Glucose.: 274 mg/dL (14 Nov 2022 11:08)    pro-bnp 352 11-11 @ 15:50     d-dimer --  11-11 @ 15:50      RADIOLOGY & ADDITIONAL TESTS:    CXR:    Ct scan chest:    ekg;  < from: Nuclear Stress Test-Pharmacologic (11.14.22 @ 05:30) >  IMPRESSIONS:Normal Study  * Negative ECG evidence of ischemia after IV of Lexiscan.  * Review of raw data shows: The study is of good technical  quality.  * The left ventricle was normal in size. Normal myocardial  perfusion scan, with no evidence of infarction or  inducible ischemia.  * Post-stress resting myocardial perfusion gated SPECT  imaging was performed (LVEF > 70%)      < end of copied text >    echo:

## 2022-11-15 NOTE — PROGRESS NOTE ADULT - SUBJECTIVE AND OBJECTIVE BOX
Interval Events:  pt in nad    Allergies    No Known Allergies    Intolerances      Endocrine/Metabolic Medications:  atorvastatin 40 milliGRAM(s) Oral at bedtime  insulin lispro (ADMELOG) corrective regimen sliding scale   SubCutaneous three times a day before meals  insulin lispro (ADMELOG) corrective regimen sliding scale   SubCutaneous at bedtime  predniSONE   Tablet 40 milliGRAM(s) Oral daily      Vital Signs Last 24 Hrs  T(C): 36.9 (15 Nov 2022 09:46), Max: 37.1 (14 Nov 2022 21:08)  T(F): 98.5 (15 Nov 2022 09:46), Max: 98.7 (14 Nov 2022 21:08)  HR: 77 (15 Nov 2022 09:46) (77 - 89)  BP: 156/88 (15 Nov 2022 09:46) (145/75 - 169/88)  BP(mean): --  RR: 18 (15 Nov 2022 09:46) (16 - 18)  SpO2: 97% (15 Nov 2022 09:46) (96% - 97%)    Parameters below as of 15 Nov 2022 09:46  Patient On (Oxygen Delivery Method): room air          PHYSICAL EXAM  All physical exam findings normal, except those marked:  General:	Alert, active, cooperative, NAD, well hydrated  .		[] Abnormal:  Neck		Normal: supple, no cervical adenopathy, no palpable thyroid  .		[] Abnormal:  Cardiovascular	Normal: regular rate, normal S1, S2, no murmurs  .		[] Abnormal:  Respiratory	Normal: no chest wall deformity, normal respiratory pattern, CTA B/L  .		[] Abnormal:  Abdominal	Normal: soft, ND, NT, bowel sounds present, no masses, no organomegaly  .		[] Abnormal:  		Normal normal genitalia, testes descended, circumcised/uncircumcised  .		Chelsea stage:			Breast chelsea:  .		Menstrual history:  .		[] Abnormal:  Extremities	Normal: FROM x4  .		[] Abnormal:  Skin		Normal: intact and not indurated, no rash, no acanthosis nigricans  .		[] Abnormal:  Neurologic	Normal: grossly intact  .		[] Abnormal:    LABS                        10.6   8.05  )-----------( 192      ( 15 Nov 2022 06:30 )             33.1                               138    |  106    |  18                  Calcium: 8.9   / iCa: x      (11-15 @ 06:30)    ----------------------------<  216       Magnesium: 2.2                              3.8     |  23     |  0.77             Phosphorous: 1.4        CAPILLARY BLOOD GLUCOSE      POCT Blood Glucose.: 205 mg/dL (15 Nov 2022 07:40)  POCT Blood Glucose.: 271 mg/dL (14 Nov 2022 22:15)  POCT Blood Glucose.: 301 mg/dL (14 Nov 2022 17:19)  POCT Blood Glucose.: 274 mg/dL (14 Nov 2022 11:08)        Assesment/plan    87 year old F with PMH of HTN, HLD, DM, recent URI presents after two episodes of loss of consciousness at home.   Found to have abn tfts. Pt and family denies prev hx of thyroid dz. Denies palp/wt loss. Has hx of dm on glipizide and farxiga as out pt.       Problem/Recommendation - 1:  ·  Problem: Abnormal thyroid blood test.   ·  Recommendation: r/o hyperthyroidism- no sx but with new onset A-fib  repeat tsh- 0.14 await free t4  start tapazole 10mg qd  d/w family and prim team.     Problem/Recommendation - 2:  ·  Problem: Syncope.   ·  Recommendation: w/u per cardio   stress test today.     Problem/Recommendation - 3:  ·  Problem: Diabetes.   ·  Recommendation: decent control as out pt  on oral dm meds  a1c- 7.6  now hyperglycemic due to prednisone  start lantus 10 units  change to orals  dm meds upon d/c.     Problem/Recommendation - 4:  ·  Problem: New onset atrial fibrillation.   ·  Recommendation: ? due to hyperthyroidism- start tapazole 10mg qd  tx per cardio.

## 2022-11-15 NOTE — PROGRESS NOTE ADULT - SUBJECTIVE AND OBJECTIVE BOX
PGY-1 Progress Note discussed with attending      PLEASE CONTACT ON CALL TEAM:  - On Call Team (Please refer to Nelly) FROM 5:00 PM - 8:30PM  - Nightfloat Team FROM 8:30 -7:30 AM    INTERVAL HPI/OVERNIGHT EVENTS: No acute events overnight.  Patient examined at bedside.  Patients states her shortness of breath has improved.  Patient denies other complaints.      REVIEW OF SYSTEMS:  CONSTITUTIONAL: No fever, weight loss, or fatigue  RESPIRATORY: MILD SHORTNESS OF BREATH; No cough, wheezing, chills or hemoptysis;  CARDIOVASCULAR: No chest pain, palpitations, dizziness, or leg swelling  GASTROINTESTINAL: No abdominal pain. No nausea, vomiting, or hematemesis; No diarrhea or constipation. No melena or hematochezia.  GENITOURINARY: No dysuria or hematuria, urinary frequency  NEUROLOGICAL: No headaches, memory loss, loss of strength, numbness, or tremors  SKIN: No itching, burning, rashes, or lesions     MEDICATIONS  (STANDING):  atorvastatin 40 milliGRAM(s) Oral at bedtime  enoxaparin Injectable 60 milliGRAM(s) SubCutaneous every 12 hours  insulin lispro (ADMELOG) corrective regimen sliding scale   SubCutaneous three times a day before meals  insulin lispro (ADMELOG) corrective regimen sliding scale   SubCutaneous at bedtime  metoprolol tartrate 12.5 milliGRAM(s) Oral every 12 hours  oseltamivir 75 milliGRAM(s) Oral two times a day  pantoprazole    Tablet 40 milliGRAM(s) Oral before breakfast  potassium phosphate IVPB 30 milliMole(s) IV Intermittent once  predniSONE   Tablet 40 milliGRAM(s) Oral daily  sodium chloride 0.9%. 1000 milliLiter(s) (75 mL/Hr) IV Continuous <Continuous>    MEDICATIONS  (PRN):  acetaminophen     Tablet .. 650 milliGRAM(s) Oral every 6 hours PRN Temp greater or equal to 38C (100.4F), Mild Pain (1 - 3)  albuterol    90 MICROgram(s) HFA Inhaler 2 Puff(s) Inhalation every 6 hours PRN Shortness of Breath and/or Wheezing  guaiFENesin Oral Liquid (Sugar-Free) 200 milliGRAM(s) Oral every 6 hours PRN Cough      Vital Signs Last 24 Hrs  T(C): 36.9 (15 Nov 2022 09:46), Max: 37.1 (14 Nov 2022 21:08)  T(F): 98.5 (15 Nov 2022 09:46), Max: 98.7 (14 Nov 2022 21:08)  HR: 77 (15 Nov 2022 09:46) (77 - 89)  BP: 156/88 (15 Nov 2022 09:46) (145/75 - 169/88)  BP(mean): --  RR: 18 (15 Nov 2022 09:46) (16 - 18)  SpO2: 97% (15 Nov 2022 09:46) (96% - 97%)    Parameters below as of 15 Nov 2022 09:46  Patient On (Oxygen Delivery Method): room air        PHYSICAL EXAMINATION:  GENERAL: NAD, well built  HEAD:  Atraumatic, Normocephalic  EYES:  conjunctiva and sclera clear  NECK: Supple, No JVD, Normal thyroid  CHEST/LUNG: Improved b/l wheezing;Clear to auscultation. Clear to percussion bilaterally; No rales, rhonchi, or rubs  HEART: Regular rate and rhythm; No murmurs, rubs, or gallops  ABDOMEN: Soft, Nontender, Nondistended; Bowel sounds present, no pain or masses on palpation  NERVOUS SYSTEM:  Alert & Oriented X3  : voiding well  EXTREMITIES:  2+ Peripheral Pulses, No clubbing, cyanosis, or edema  SKIN: warm dry                          10.6   8.05  )-----------( 192      ( 15 Nov 2022 06:30 )             33.1     11-15    138  |  106  |  18  ----------------------------<  216<H>  3.8   |  23  |  0.77    Ca    8.9      15 Nov 2022 06:30  Phos  1.4     11-15  Mg     2.2     11-15                I&O's Summary          CAPILLARY BLOOD GLUCOSE      RADIOLOGY & ADDITIONAL TESTS:

## 2022-11-15 NOTE — PROGRESS NOTE ADULT - PROBLEM SELECTOR PLAN 4
- patient presents with hyponatremia to 126 on admission s/p 1 L NS in ED and fluids by EMS as well  - likely due to dehydration in the setting of acute viral URI  - Increase rate of IV NS to 75cc/hr  - monitor Na  - consider further workup of hyponatremia if no improvement with IVF  - Resolved

## 2022-11-15 NOTE — PROGRESS NOTE ADULT - PROBLEM SELECTOR PLAN 7
- patient takes glipizide 5 mg ER qd, farxiga 10 mg qd  - insulin sliding scale acqhs  - monitor glucose

## 2022-11-15 NOTE — PROGRESS NOTE ADULT - PROBLEM SELECTOR PLAN 8
likely underlying hyperthyroidism  Endo follow up  Tapazole 10 mgs po daily in view of new onset A. Fib

## 2022-11-15 NOTE — PROGRESS NOTE ADULT - PROBLEM SELECTOR PLAN 1
- patient presents with 2 episodes of LOC without head trauma, more likely to be syncope in the setting of new arrhythmia (aflutter) as well as acute viral URI (influenza A)  - telemetry monitoring  - serial troponin Negative x3  - EKG - A. Fib  - TTE - EF 55%, Severly enlarged left atrium  - CT head - no acute changes  -  Stress test - Negative for reversible ischemia  - Cardiology consult, Dr. Rivera - Pt tested positive for influenza A in ED 11/11  - Continue tamiflu 75 mg bid for 5 days total (day 3)  - prn acetaminophen for fever/headache  - droplet isolation  - Wheezing, not requiring Supplemental O2

## 2022-11-16 LAB
ANION GAP SERPL CALC-SCNC: 8 MMOL/L — SIGNIFICANT CHANGE UP (ref 5–17)
BUN SERPL-MCNC: 13 MG/DL — SIGNIFICANT CHANGE UP (ref 7–18)
CALCIUM SERPL-MCNC: 9.4 MG/DL — SIGNIFICANT CHANGE UP (ref 8.4–10.5)
CHLORIDE SERPL-SCNC: 102 MMOL/L — SIGNIFICANT CHANGE UP (ref 96–108)
CO2 SERPL-SCNC: 26 MMOL/L — SIGNIFICANT CHANGE UP (ref 22–31)
CREAT SERPL-MCNC: 0.71 MG/DL — SIGNIFICANT CHANGE UP (ref 0.5–1.3)
EGFR: 82 ML/MIN/1.73M2 — SIGNIFICANT CHANGE UP
GLUCOSE BLDC GLUCOMTR-MCNC: 164 MG/DL — HIGH (ref 70–99)
GLUCOSE BLDC GLUCOMTR-MCNC: 233 MG/DL — HIGH (ref 70–99)
GLUCOSE BLDC GLUCOMTR-MCNC: 318 MG/DL — HIGH (ref 70–99)
GLUCOSE BLDC GLUCOMTR-MCNC: 329 MG/DL — HIGH (ref 70–99)
GLUCOSE BLDC GLUCOMTR-MCNC: 407 MG/DL — HIGH (ref 70–99)
GLUCOSE SERPL-MCNC: 221 MG/DL — HIGH (ref 70–99)
HCT VFR BLD CALC: 34.5 % — SIGNIFICANT CHANGE UP (ref 34.5–45)
HGB BLD-MCNC: 11.3 G/DL — LOW (ref 11.5–15.5)
MAGNESIUM SERPL-MCNC: 1.8 MG/DL — SIGNIFICANT CHANGE UP (ref 1.6–2.6)
MCHC RBC-ENTMCNC: 26.7 PG — LOW (ref 27–34)
MCHC RBC-ENTMCNC: 32.8 GM/DL — SIGNIFICANT CHANGE UP (ref 32–36)
MCV RBC AUTO: 81.6 FL — SIGNIFICANT CHANGE UP (ref 80–100)
NRBC # BLD: 0 /100 WBCS — SIGNIFICANT CHANGE UP (ref 0–0)
PHOSPHATE SERPL-MCNC: 1.5 MG/DL — LOW (ref 2.5–4.5)
PLATELET # BLD AUTO: 193 K/UL — SIGNIFICANT CHANGE UP (ref 150–400)
POTASSIUM SERPL-MCNC: 3.8 MMOL/L — SIGNIFICANT CHANGE UP (ref 3.5–5.3)
POTASSIUM SERPL-SCNC: 3.8 MMOL/L — SIGNIFICANT CHANGE UP (ref 3.5–5.3)
RBC # BLD: 4.23 M/UL — SIGNIFICANT CHANGE UP (ref 3.8–5.2)
RBC # FLD: 14.8 % — HIGH (ref 10.3–14.5)
SODIUM SERPL-SCNC: 136 MMOL/L — SIGNIFICANT CHANGE UP (ref 135–145)
T4 FREE SERPL-MCNC: 1.9 NG/DL — HIGH (ref 0.9–1.8)
TROPONIN I, HIGH SENSITIVITY RESULT: 17.8 NG/L — SIGNIFICANT CHANGE UP
WBC # BLD: 12.81 K/UL — HIGH (ref 3.8–10.5)
WBC # FLD AUTO: 12.81 K/UL — HIGH (ref 3.8–10.5)

## 2022-11-16 RX ORDER — INSULIN LISPRO 100/ML
8 VIAL (ML) SUBCUTANEOUS ONCE
Refills: 0 | Status: COMPLETED | OUTPATIENT
Start: 2022-11-16 | End: 2022-11-16

## 2022-11-16 RX ORDER — METFORMIN HYDROCHLORIDE 850 MG/1
1000 TABLET ORAL
Refills: 0 | Status: DISCONTINUED | OUTPATIENT
Start: 2022-11-16 | End: 2022-11-17

## 2022-11-16 RX ORDER — APIXABAN 2.5 MG/1
2.5 TABLET, FILM COATED ORAL EVERY 12 HOURS
Refills: 0 | Status: DISCONTINUED | OUTPATIENT
Start: 2022-11-16 | End: 2022-11-17

## 2022-11-16 RX ORDER — METFORMIN HYDROCHLORIDE 850 MG/1
850 TABLET ORAL
Refills: 0 | Status: DISCONTINUED | OUTPATIENT
Start: 2022-11-16 | End: 2022-11-16

## 2022-11-16 RX ORDER — POTASSIUM PHOSPHATE, MONOBASIC POTASSIUM PHOSPHATE, DIBASIC 236; 224 MG/ML; MG/ML
30 INJECTION, SOLUTION INTRAVENOUS ONCE
Refills: 0 | Status: COMPLETED | OUTPATIENT
Start: 2022-11-16 | End: 2022-11-16

## 2022-11-16 RX ADMIN — Medication 100 MILLIGRAM(S): at 14:03

## 2022-11-16 RX ADMIN — Medication 40 MILLIGRAM(S): at 06:01

## 2022-11-16 RX ADMIN — Medication 2: at 08:02

## 2022-11-16 RX ADMIN — PANTOPRAZOLE SODIUM 40 MILLIGRAM(S): 20 TABLET, DELAYED RELEASE ORAL at 06:01

## 2022-11-16 RX ADMIN — Medication 75 MILLIGRAM(S): at 06:01

## 2022-11-16 RX ADMIN — ENOXAPARIN SODIUM 60 MILLIGRAM(S): 100 INJECTION SUBCUTANEOUS at 06:01

## 2022-11-16 RX ADMIN — Medication 100 MILLIGRAM(S): at 06:02

## 2022-11-16 RX ADMIN — Medication 8 UNIT(S): at 12:15

## 2022-11-16 RX ADMIN — Medication 75 MILLIGRAM(S): at 18:14

## 2022-11-16 RX ADMIN — Medication 4: at 17:36

## 2022-11-16 RX ADMIN — Medication 12.5 MILLIGRAM(S): at 06:02

## 2022-11-16 RX ADMIN — APIXABAN 2.5 MILLIGRAM(S): 2.5 TABLET, FILM COATED ORAL at 18:14

## 2022-11-16 RX ADMIN — Medication 100 MILLIGRAM(S): at 21:41

## 2022-11-16 RX ADMIN — POTASSIUM PHOSPHATE, MONOBASIC POTASSIUM PHOSPHATE, DIBASIC 83.33 MILLIMOLE(S): 236; 224 INJECTION, SOLUTION INTRAVENOUS at 10:56

## 2022-11-16 RX ADMIN — METFORMIN HYDROCHLORIDE 1000 MILLIGRAM(S): 850 TABLET ORAL at 18:14

## 2022-11-16 RX ADMIN — ATORVASTATIN CALCIUM 40 MILLIGRAM(S): 80 TABLET, FILM COATED ORAL at 21:41

## 2022-11-16 RX ADMIN — Medication 12.5 MILLIGRAM(S): at 18:14

## 2022-11-16 RX ADMIN — SODIUM CHLORIDE 75 MILLILITER(S): 9 INJECTION INTRAMUSCULAR; INTRAVENOUS; SUBCUTANEOUS at 21:42

## 2022-11-16 RX ADMIN — ALBUTEROL 2 PUFF(S): 90 AEROSOL, METERED ORAL at 01:45

## 2022-11-16 NOTE — PROGRESS NOTE ADULT - SUBJECTIVE AND OBJECTIVE BOX
CHIEF COMPLAINT:Patient is a 87y old  Female who presents with a chief complaint of Syncope; new onset Afib .Pt appears comfortable.    	  REVIEW OF SYSTEMS:  CONSTITUTIONAL: No fever, weight loss, or fatigue  EYES: No eye pain, visual disturbances, or discharge  ENT:  No difficulty hearing, tinnitus, vertigo; No sinus or throat pain  NECK: No pain or stiffness  RESPIRATORY: No cough, wheezing, chills or hemoptysis; No Shortness of Breath  CARDIOVASCULAR: No chest pain, palpitations, passing out, dizziness, or leg swelling  GASTROINTESTINAL: No abdominal or epigastric pain. No nausea, vomiting, or hematemesis; No diarrhea or constipation. No melena or hematochezia.  GENITOURINARY: No dysuria, frequency, hematuria, or incontinence  NEUROLOGICAL: No headaches, memory loss, loss of strength, numbness, or tremors  SKIN: No itching, burning, rashes, or lesions   LYMPH Nodes: No enlarged glands  ENDOCRINE: No heat or cold intolerance; No hair loss  MUSCULOSKELETAL: No joint pain or swelling; No muscle, back, or extremity pain  PSYCHIATRIC: No depression, anxiety, mood swings, or difficulty sleeping  HEME/LYMPH: No easy bruising, or bleeding gums  ALLERGY AND IMMUNOLOGIC: No hives or eczema	      PHYSICAL EXAM:  T(C): 36.7 (11-16-22 @ 10:17), Max: 37.8 (11-15-22 @ 13:58)  HR: 81 (11-16-22 @ 10:17) (78 - 86)  BP: 154/72 (11-16-22 @ 10:17) (149/88 - 180/98)  RR: 18 (11-16-22 @ 10:17) (16 - 18)  SpO2: 95% (11-16-22 @ 10:17) (95% - 96%)  Wt(kg): --  I&O's Summary      Appearance: Normal	  HEENT:   Normal oral mucosa, PERRL, EOMI	  Lymphatic: No lymphadenopathy  Cardiovascular: Normal S1 S2, No JVD, No murmurs, No edema  Respiratory: Lungs clear to auscultation	  Psychiatry: A & O x 3, Mood & affect appropriate  Gastrointestinal:  Soft, Non-tender, + BS	  Skin: No rashes, No ecchymoses, No cyanosis	  Neurologic: Non-focal  Extremities: Normal range of motion, No clubbing, cyanosis or edema  Vascular: Peripheral pulses palpable 2+ bilaterally    MEDICATIONS  (STANDING):  atorvastatin 40 milliGRAM(s) Oral at bedtime  benzonatate 100 milliGRAM(s) Oral three times a day  enoxaparin Injectable 60 milliGRAM(s) SubCutaneous every 12 hours  insulin lispro (ADMELOG) corrective regimen sliding scale   SubCutaneous three times a day before meals  insulin lispro (ADMELOG) corrective regimen sliding scale   SubCutaneous at bedtime  methimazole 10 milliGRAM(s) Oral daily  metoprolol tartrate 12.5 milliGRAM(s) Oral every 12 hours  oseltamivir 75 milliGRAM(s) Oral two times a day  pantoprazole    Tablet 40 milliGRAM(s) Oral before breakfast  predniSONE   Tablet 30 milliGRAM(s) Oral daily  sodium chloride 0.9%. 1000 milliLiter(s) (75 mL/Hr) IV Continuous <Continuous>     	  	  LABS:	 	    Troponin I, High Sensitivity Result: 17.8 ng/L (11-16 @ 06:46)                            11.3   12.81 )-----------( 193      ( 16 Nov 2022 06:46 )             34.5     11-16    136  |  102  |  13  ----------------------------<  221<H>  3.8   |  26  |  0.71    Ca    9.4      16 Nov 2022 06:46  Phos  1.5     11-16  Mg     1.8     11-16      proBNP: Serum Pro-Brain Natriuretic Peptide: 352 pg/mL (11-11 @ 15:50)      TSH: Thyroid Stimulating Hormone, Serum: 0.14 uU/mL (11-15 @ 06:30)  Thyroid Stimulating Hormone, Serum: 0.24 uU/mL (11-13 @ 07:00)

## 2022-11-16 NOTE — PROGRESS NOTE ADULT - PROBLEM SELECTOR PLAN 3
- patient presents with 2 episodes of LOC without head trauma, more likely to be syncope in the setting of new arrhythmia (aflutter) as well as acute viral URI (influenza A)  - telemetry monitoring  - serial troponin Negative x3  - EKG - A. Fib  - TTE - EF 55%, Severly enlarged left atrium  - CT head - no acute changes  -  Stress test - Negative for reversible ischemia  - Cardiology consult, Dr. Rivera

## 2022-11-16 NOTE — PROGRESS NOTE ADULT - SUBJECTIVE AND OBJECTIVE BOX
Interval Events:      Allergies    No Known Allergies    Intolerances      Endocrine/Metabolic Medications:  atorvastatin 40 milliGRAM(s) Oral at bedtime  insulin lispro (ADMELOG) corrective regimen sliding scale   SubCutaneous three times a day before meals  insulin lispro (ADMELOG) corrective regimen sliding scale   SubCutaneous at bedtime  predniSONE   Tablet 30 milliGRAM(s) Oral daily      Vital Signs Last 24 Hrs  T(C): 36.7 (16 Nov 2022 10:17), Max: 37.8 (15 Nov 2022 13:58)  T(F): 98 (16 Nov 2022 10:17), Max: 100 (15 Nov 2022 13:58)  HR: 81 (16 Nov 2022 10:17) (78 - 86)  BP: 154/72 (16 Nov 2022 10:17) (149/88 - 180/98)  BP(mean): --  RR: 18 (16 Nov 2022 10:17) (16 - 18)  SpO2: 95% (16 Nov 2022 10:17) (95% - 96%)    Parameters below as of 16 Nov 2022 10:17  Patient On (Oxygen Delivery Method): room air          PHYSICAL EXAM  All physical exam findings normal, except those marked:  General:	Alert, active, cooperative, NAD, well hydrated  .		[] Abnormal:  Neck		Normal: supple, no cervical adenopathy, no palpable thyroid  .		[] Abnormal:  Cardiovascular	Normal: regular rate, normal S1, S2, no murmurs  .		[] Abnormal:  Respiratory	Normal: no chest wall deformity, normal respiratory pattern, CTA B/L  .		[] Abnormal:  Abdominal	Normal: soft, ND, NT, bowel sounds present, no masses, no organomegaly  .		[] Abnormal:  		Normal normal genitalia, testes descended, circumcised/uncircumcised  .		Chelsea stage:			Breast chelsea:  .		Menstrual history:  .		[] Abnormal:  Extremities	Normal: FROM x4  .		[] Abnormal:  Skin		Normal: intact and not indurated, no rash, no acanthosis nigricans  .		[] Abnormal:  Neurologic	Normal: grossly intact  .		[] Abnormal:    LABS                        11.3   12.81 )-----------( 193      ( 16 Nov 2022 06:46 )             34.5                               136    |  102    |  13                  Calcium: 9.4   / iCa: x      (11-16 @ 06:46)    ----------------------------<  221       Magnesium: 1.8                              3.8     |  26     |  0.71             Phosphorous: 1.5        CAPILLARY BLOOD GLUCOSE      POCT Blood Glucose.: 233 mg/dL (16 Nov 2022 07:39)  POCT Blood Glucose.: 294 mg/dL (15 Nov 2022 22:24)  POCT Blood Glucose.: 271 mg/dL (15 Nov 2022 16:57)  POCT Blood Glucose.: 253 mg/dL (15 Nov 2022 11:20)        Assesment/plan       Interval Events:  pt in nad    Allergies    No Known Allergies    Intolerances      Endocrine/Metabolic Medications:  atorvastatin 40 milliGRAM(s) Oral at bedtime  insulin lispro (ADMELOG) corrective regimen sliding scale   SubCutaneous three times a day before meals  insulin lispro (ADMELOG) corrective regimen sliding scale   SubCutaneous at bedtime  predniSONE   Tablet 30 milliGRAM(s) Oral daily      Vital Signs Last 24 Hrs  T(C): 36.7 (16 Nov 2022 10:17), Max: 37.8 (15 Nov 2022 13:58)  T(F): 98 (16 Nov 2022 10:17), Max: 100 (15 Nov 2022 13:58)  HR: 81 (16 Nov 2022 10:17) (78 - 86)  BP: 154/72 (16 Nov 2022 10:17) (149/88 - 180/98)  BP(mean): --  RR: 18 (16 Nov 2022 10:17) (16 - 18)  SpO2: 95% (16 Nov 2022 10:17) (95% - 96%)    Parameters below as of 16 Nov 2022 10:17  Patient On (Oxygen Delivery Method): room air          PHYSICAL EXAM  All physical exam findings normal, except those marked:  General:	Alert, active, cooperative, NAD, well hydrated  .		[] Abnormal:  Neck		Normal: supple, no cervical adenopathy, no palpable thyroid  .		[] Abnormal:  Cardiovascular	Normal: regular rate, normal S1, S2, no murmurs  .		[] Abnormal:  Respiratory	Normal: no chest wall deformity, normal respiratory pattern, CTA B/L  .		[] Abnormal:  Abdominal	Normal: soft, ND, NT, bowel sounds present, no masses, no organomegaly  .		[] Abnormal:  		Normal normal genitalia, testes descended, circumcised/uncircumcised  .		Chelsea stage:			Breast chelsea:  .		Menstrual history:  .		[] Abnormal:  Extremities	Normal: FROM x4  .		[] Abnormal:  Skin		Normal: intact and not indurated, no rash, no acanthosis nigricans  .		[] Abnormal:  Neurologic	Normal: grossly intact  .		[] Abnormal:    LABS                        11.3   12.81 )-----------( 193      ( 16 Nov 2022 06:46 )             34.5                               136    |  102    |  13                  Calcium: 9.4   / iCa: x      (11-16 @ 06:46)    ----------------------------<  221       Magnesium: 1.8                              3.8     |  26     |  0.71             Phosphorous: 1.5        CAPILLARY BLOOD GLUCOSE      POCT Blood Glucose.: 233 mg/dL (16 Nov 2022 07:39)  POCT Blood Glucose.: 294 mg/dL (15 Nov 2022 22:24)  POCT Blood Glucose.: 271 mg/dL (15 Nov 2022 16:57)  POCT Blood Glucose.: 253 mg/dL (15 Nov 2022 11:20)        Assesment/plan    87 year old F with PMH of HTN, HLD, DM, recent URI presents after two episodes of loss of consciousness at home.   Found to have abn tfts. Pt and family denies prev hx of thyroid dz. Denies palp/wt loss. Has hx of dm on glipizide and farxiga as out pt.       Problem/Recommendation - 1:  ·  Problem: Abnormal thyroid blood test.   ·  Recommendation: r/o hyperthyroidism- no sx but with new onset A-fib  repeat tsh- 0.14 await free t4  start tapazole 10mg qd  d/w family and prim team.     Problem/Recommendation - 2:  ·  Problem: Syncope.   ·  Recommendation: w/u per cardio   stress test today.     Problem/Recommendation - 3:  ·  Problem: Diabetes.   ·  Recommendation: decent control as out pt  on oral dm meds  a1c- 7.6  now hyperglycemic due to prednisone  start lantus 10 units  change to orals  dm meds upon d/c.     Problem/Recommendation - 4:  ·  Problem: New onset atrial fibrillation.   ·  Recommendation: ? due to hyperthyroidism- start tapazole 10mg qd  tx per cardio.

## 2022-11-16 NOTE — PROGRESS NOTE ADULT - SUBJECTIVE AND OBJECTIVE BOX
Patient is a 87y old  Female who presents with a chief complaint of Syncope; new onset Afib (15 Nov 2022 11:01)    pt seen in tele [ x ], reg med floor [  ], bed [x  ], chair at bedside [   ], a+o x3 [ x ], lethargic [  ],    nad [ x ]      Allergies    No Known Allergies        Vitals    T(F): 99.3 (11-16-22 @ 05:45), Max: 100 (11-15-22 @ 13:58)  HR: 86 (11-16-22 @ 05:45) (77 - 86)  BP: 149/88 (11-16-22 @ 05:45) (149/88 - 180/98)  RR: 18 (11-16-22 @ 05:45) (16 - 18)  SpO2: 96% (11-16-22 @ 05:45) (95% - 97%)  Wt(kg): --  CAPILLARY BLOOD GLUCOSE      POCT Blood Glucose.: 294 mg/dL (15 Nov 2022 22:24)      Labs                          10.6   8.05  )-----------( 192      ( 15 Nov 2022 06:30 )             33.1       11-15    138  |  106  |  18  ----------------------------<  216<H>  3.8   |  23  |  0.77    Ca    8.9      15 Nov 2022 06:30  Phos  1.4     11-15  Mg     2.2     11-15                  Radiology Results      Meds    MEDICATIONS  (STANDING):  atorvastatin 40 milliGRAM(s) Oral at bedtime  benzonatate 100 milliGRAM(s) Oral three times a day  enoxaparin Injectable 60 milliGRAM(s) SubCutaneous every 12 hours  insulin lispro (ADMELOG) corrective regimen sliding scale   SubCutaneous three times a day before meals  insulin lispro (ADMELOG) corrective regimen sliding scale   SubCutaneous at bedtime  metoprolol tartrate 12.5 milliGRAM(s) Oral every 12 hours  oseltamivir 75 milliGRAM(s) Oral two times a day  pantoprazole    Tablet 40 milliGRAM(s) Oral before breakfast  predniSONE   Tablet 40 milliGRAM(s) Oral daily  sodium chloride 0.9%. 1000 milliLiter(s) (75 mL/Hr) IV Continuous <Continuous>      MEDICATIONS  (PRN):  acetaminophen     Tablet .. 650 milliGRAM(s) Oral every 6 hours PRN Temp greater or equal to 38C (100.4F), Mild Pain (1 - 3)  albuterol    90 MICROgram(s) HFA Inhaler 2 Puff(s) Inhalation every 6 hours PRN Shortness of Breath and/or Wheezing  guaiFENesin Oral Liquid (Sugar-Free) 200 milliGRAM(s) Oral every 6 hours PRN Cough  sodium chloride 0.65% Nasal 1 Spray(s) Both Nostrils two times a day PRN Nasal Congestion      Physical Exam    Neuro :  no focal deficits  Respiratory: cta B/L    CV: RRR, S1S2, no murmurs,   Abdominal: Soft, NT, ND +BS,  Extremities: No edema, + peripheral pulses      ASSESSMENT    syncope likely cardiogenic   new afib,   flu a,   hyponatremia,  hypophosphatemia  hypomagnesemia   hyperthyroidism  h/o HTN,   HLD,  DM,  CAD (coronary artery disease)      PLAN     cont tele,   aspirin, statin,   ct head with No evidence of acute intracranial hemorrhage, midline shift or CT evidence of acute territorial infarct noted    trop x2 neg noted     cardio f/u `  echo with  EF = 55 to 60%. RV systolic pressure is mildly increased noted    stress test neg noted above  cont lopressor,  change lovenox to eliquis 2.5mg bid  cont tamiflu until 11/16/22,   pulm f/u   cont prednisone 40 daily and taper by 10 mg daily  robitussin dm prn cough   albuterol prn  pft outpt  sup phos   hgba1c 7.6 noted     now hyperglycemic due to prednisone  start lantus 10 units  change to orals  dm meds upon d/c  tsh low and ft4 slightly elevated noted above  endo f/u  consider tapazole 10mg qd  rept tsh  cont current meds   d/c planing        Patient is a 87y old  Female who presents with a chief complaint of Syncope; new onset Afib (15 Nov 2022 11:01)    pt seen in tele [ x ], reg med floor [  ], bed [x  ], chair at bedside [   ], a+o x3 [ x ], lethargic [  ],    nad [ x ]      Allergies    No Known Allergies        Vitals    T(F): 99.3 (11-16-22 @ 05:45), Max: 100 (11-15-22 @ 13:58)  HR: 86 (11-16-22 @ 05:45) (77 - 86)  BP: 149/88 (11-16-22 @ 05:45) (149/88 - 180/98)  RR: 18 (11-16-22 @ 05:45) (16 - 18)  SpO2: 96% (11-16-22 @ 05:45) (95% - 97%)  Wt(kg): --  CAPILLARY BLOOD GLUCOSE      POCT Blood Glucose.: 294 mg/dL (15 Nov 2022 22:24)      Labs                          10.6   8.05  )-----------( 192      ( 15 Nov 2022 06:30 )             33.1       11-15    138  |  106  |  18  ----------------------------<  216<H>  3.8   |  23  |  0.77    Ca    8.9      15 Nov 2022 06:30  Phos  1.4     11-15  Mg     2.2     11-15                  Radiology Results      Meds    MEDICATIONS  (STANDING):  atorvastatin 40 milliGRAM(s) Oral at bedtime  benzonatate 100 milliGRAM(s) Oral three times a day  enoxaparin Injectable 60 milliGRAM(s) SubCutaneous every 12 hours  insulin lispro (ADMELOG) corrective regimen sliding scale   SubCutaneous three times a day before meals  insulin lispro (ADMELOG) corrective regimen sliding scale   SubCutaneous at bedtime  metoprolol tartrate 12.5 milliGRAM(s) Oral every 12 hours  oseltamivir 75 milliGRAM(s) Oral two times a day  pantoprazole    Tablet 40 milliGRAM(s) Oral before breakfast  predniSONE   Tablet 40 milliGRAM(s) Oral daily  sodium chloride 0.9%. 1000 milliLiter(s) (75 mL/Hr) IV Continuous <Continuous>      MEDICATIONS  (PRN):  acetaminophen     Tablet .. 650 milliGRAM(s) Oral every 6 hours PRN Temp greater or equal to 38C (100.4F), Mild Pain (1 - 3)  albuterol    90 MICROgram(s) HFA Inhaler 2 Puff(s) Inhalation every 6 hours PRN Shortness of Breath and/or Wheezing  guaiFENesin Oral Liquid (Sugar-Free) 200 milliGRAM(s) Oral every 6 hours PRN Cough  sodium chloride 0.65% Nasal 1 Spray(s) Both Nostrils two times a day PRN Nasal Congestion      Physical Exam    Neuro :  no focal deficits  Respiratory: cta B/L    CV: RRR, S1S2, no murmurs,   Abdominal: Soft, NT, ND +BS,  Extremities: No edema, + peripheral pulses      ASSESSMENT    syncope likely cardiogenic   new afib,   flu a,   hyponatremia,  hypophosphatemia  hypomagnesemia   hyperthyroidism  h/o HTN,   HLD,  DM,  CAD (coronary artery disease)      PLAN     cont tele,   aspirin, statin,   ct head with No evidence of acute intracranial hemorrhage, midline shift or CT evidence of acute territorial infarct noted    trop x2 neg noted     cardio f/u `  echo with  EF = 55 to 60%. RV systolic pressure is mildly increased noted    stress test neg noted    cont lopressor,  change lovenox to eliquis 2.5mg bid  pt to complete tamiflu today11/16/22,   pulm f/u   cont prednisone 40 daily and taper by 10 mg daily  robitussin dm prn cough   albuterol prn  pft outpt  sup phos   hgba1c 7.6 noted     now hyperglycemic due to prednisone  cont lantus 10 units  change to orals  dm meds upon d/c   resume metformin 100mg bid  tsh low and ft4 slightly elevated noted above  endo f/u  cont tapazole 10mg qd  rept tsh  cont current meds   d/c planing for am if stable

## 2022-11-16 NOTE — PROGRESS NOTE ADULT - PROBLEM SELECTOR PLAN 1
- Pt tested positive for influenza A in ED 11/11  - Continue tamiflu 75 mg bid for 5 days total (day 3)  - prn acetaminophen for fever/headache  - droplet isolation  - Wheezing, not requiring Supplemental O2 - Pt tested positive for influenza A in ED 11/11  - Continue tamiflu 75 mg bid for 5 days total (day 3)  - prn acetaminophen for fever/headache  - Started Tessalon Perle  - droplet isolation  - Wheezing, not requiring Supplemental O2

## 2022-11-16 NOTE — PROGRESS NOTE ADULT - PROBLEM SELECTOR PLAN 2
- patient presents with A Fib on EKG, irregularly irregular rhythm  - rate controlled, new-onset as patient does not have known history of arrhythmia  - CHADSVASC 6  - continue with low dose lopressor twice daily for rate control, increase as tolerated/necessary  - Patient on xarelto 2.5 at home  - CT head - no acute changes  - Stress test - Negative for reversible signs of ischemia   - TTE - EF 55%, Severly enlarged left atrium  - D/C Home on Eliquis 2.5mg bid

## 2022-11-16 NOTE — PROGRESS NOTE ADULT - PROBLEM SELECTOR PLAN 5
- patient has history of PVD with varicose veins b/l on exam  - takes ezetimibe 10 mg qd and xarelto 2.5   - Lovenox while in hospital  - D/C Home on Eliquis 2.5mg bid

## 2022-11-16 NOTE — PROGRESS NOTE ADULT - SUBJECTIVE AND OBJECTIVE BOX
Patient is a 87y old  Female who presents with a chief complaint of Syncope; new onset Afib (16 Nov 2022 07:38)  Asleep, laying in bed in NAD. Doing well on RA    INTERVAL HPI/OVERNIGHT EVENTS:      VITAL SIGNS:  T(F): 98 (11-16-22 @ 10:17)  HR: 81 (11-16-22 @ 10:17)  BP: 154/72 (11-16-22 @ 10:17)  RR: 18 (11-16-22 @ 10:17)  SpO2: 95% (11-16-22 @ 10:17)  Wt(kg): --  I&O's Detail          REVIEW OF SYSTEMS:    CONSTITUTIONAL:  No fevers, chills, sweats    HEENT:  Eyes:  No diplopia or blurred vision. ENT:  No earache, sore throat or runny nose.    CARDIOVASCULAR:  No pressure, squeezing, tightness, or heaviness about the chest; no palpitations.    RESPIRATORY:  Per HPI    GASTROINTESTINAL:  No abdominal pain, nausea, vomiting or diarrhea.    GENITOURINARY:  No dysuria, frequency or urgency.    NEUROLOGIC:  No paresthesias, fasciculations, seizures or weakness.    PSYCHIATRIC:  No disorder of thought or mood.      PHYSICAL EXAM:    Constitutional: Well developed and nourished  Eyes:Perrla  ENMT: normal  Neck:supple  Respiratory: good air entry  Cardiovascular: S1 S2 regular  Gastrointestinal: Soft, Non tender  Extremities: No edema  Vascular:normal  Neurological:Awake, alert,Ox3  Musculoskeletal:Normal      MEDICATIONS  (STANDING):  atorvastatin 40 milliGRAM(s) Oral at bedtime  benzonatate 100 milliGRAM(s) Oral three times a day  enoxaparin Injectable 60 milliGRAM(s) SubCutaneous every 12 hours  insulin lispro (ADMELOG) corrective regimen sliding scale   SubCutaneous three times a day before meals  insulin lispro (ADMELOG) corrective regimen sliding scale   SubCutaneous at bedtime  metoprolol tartrate 12.5 milliGRAM(s) Oral every 12 hours  oseltamivir 75 milliGRAM(s) Oral two times a day  pantoprazole    Tablet 40 milliGRAM(s) Oral before breakfast  potassium phosphate IVPB 30 milliMole(s) IV Intermittent once  predniSONE   Tablet 30 milliGRAM(s) Oral daily  sodium chloride 0.9%. 1000 milliLiter(s) (75 mL/Hr) IV Continuous <Continuous>    MEDICATIONS  (PRN):  acetaminophen     Tablet .. 650 milliGRAM(s) Oral every 6 hours PRN Temp greater or equal to 38C (100.4F), Mild Pain (1 - 3)  albuterol    90 MICROgram(s) HFA Inhaler 2 Puff(s) Inhalation every 6 hours PRN Shortness of Breath and/or Wheezing  guaiFENesin Oral Liquid (Sugar-Free) 200 milliGRAM(s) Oral every 6 hours PRN Cough  sodium chloride 0.65% Nasal 1 Spray(s) Both Nostrils two times a day PRN Nasal Congestion      Allergies    No Known Allergies    Intolerances        LABS:                        11.3   12.81 )-----------( 193      ( 16 Nov 2022 06:46 )             34.5     11-16    136  |  102  |  13  ----------------------------<  221<H>  3.8   |  26  |  0.71    Ca    9.4      16 Nov 2022 06:46  Phos  1.5     11-16  Mg     1.8     11-16                CAPILLARY BLOOD GLUCOSE      POCT Blood Glucose.: 233 mg/dL (16 Nov 2022 07:39)  POCT Blood Glucose.: 294 mg/dL (15 Nov 2022 22:24)  POCT Blood Glucose.: 271 mg/dL (15 Nov 2022 16:57)  POCT Blood Glucose.: 253 mg/dL (15 Nov 2022 11:20)    pro-bnp 352 11-11 @ 15:50     d-dimer --  11-11 @ 15:50      RADIOLOGY & ADDITIONAL TESTS:    CXR:  < from: Xray Chest 1 View- PORTABLE-Urgent (Xray Chest 1 View- PORTABLE-Urgent .) (11.11.22 @ 17:10) >  IMPRESSION:   No radiographic evidence of active chest disease.    < end of copied text >    Ct scan chest:    ekg;    echo:

## 2022-11-16 NOTE — PROGRESS NOTE ADULT - SUBJECTIVE AND OBJECTIVE BOX
PGY-1 Progress Note discussed with attending      PLEASE CONTACT ON CALL TEAM:  - On Call Team (Please refer to Nelly) FROM 5:00 PM - 8:30PM  - Nightfloat Team FROM 8:30 -7:30 AM    INTERVAL HPI/OVERNIGHT EVENTS:       REVIEW OF SYSTEMS:  CONSTITUTIONAL: No fever, weight loss, or fatigue  RESPIRATORY: No cough, wheezing, chills or hemoptysis; No shortness of breath  CARDIOVASCULAR: No chest pain, palpitations, dizziness, or leg swelling  GASTROINTESTINAL: No abdominal pain. No nausea, vomiting, or hematemesis; No diarrhea or constipation. No melena or hematochezia.  GENITOURINARY: No dysuria or hematuria, urinary frequency  NEUROLOGICAL: No headaches, memory loss, loss of strength, numbness, or tremors  SKIN: No itching, burning, rashes, or lesions     MEDICATIONS  (STANDING):  atorvastatin 40 milliGRAM(s) Oral at bedtime  benzonatate 100 milliGRAM(s) Oral three times a day  enoxaparin Injectable 60 milliGRAM(s) SubCutaneous every 12 hours  insulin lispro (ADMELOG) corrective regimen sliding scale   SubCutaneous three times a day before meals  insulin lispro (ADMELOG) corrective regimen sliding scale   SubCutaneous at bedtime  metoprolol tartrate 12.5 milliGRAM(s) Oral every 12 hours  oseltamivir 75 milliGRAM(s) Oral two times a day  pantoprazole    Tablet 40 milliGRAM(s) Oral before breakfast  predniSONE   Tablet 40 milliGRAM(s) Oral daily  sodium chloride 0.9%. 1000 milliLiter(s) (75 mL/Hr) IV Continuous <Continuous>    MEDICATIONS  (PRN):  acetaminophen     Tablet .. 650 milliGRAM(s) Oral every 6 hours PRN Temp greater or equal to 38C (100.4F), Mild Pain (1 - 3)  albuterol    90 MICROgram(s) HFA Inhaler 2 Puff(s) Inhalation every 6 hours PRN Shortness of Breath and/or Wheezing  guaiFENesin Oral Liquid (Sugar-Free) 200 milliGRAM(s) Oral every 6 hours PRN Cough  sodium chloride 0.65% Nasal 1 Spray(s) Both Nostrils two times a day PRN Nasal Congestion      Vital Signs Last 24 Hrs  T(C): 37.4 (16 Nov 2022 05:45), Max: 37.8 (15 Nov 2022 13:58)  T(F): 99.3 (16 Nov 2022 05:45), Max: 100 (15 Nov 2022 13:58)  HR: 86 (16 Nov 2022 05:45) (77 - 86)  BP: 149/88 (16 Nov 2022 05:45) (149/88 - 180/98)  BP(mean): --  RR: 18 (16 Nov 2022 05:45) (16 - 18)  SpO2: 96% (16 Nov 2022 05:45) (95% - 97%)    Parameters below as of 16 Nov 2022 05:45  Patient On (Oxygen Delivery Method): room air        PHYSICAL EXAMINATION:  GENERAL: NAD, well built  HEAD:  Atraumatic, Normocephalic  EYES:  conjunctiva and sclera clear  NECK: Supple, No JVD, Normal thyroid  CHEST/LUNG: Clear to auscultation. Clear to percussion bilaterally; No rales, rhonchi, wheezing, or rubs  HEART: Regular rate and rhythm; No murmurs, rubs, or gallops  ABDOMEN: Soft, Nontender, Nondistended; Bowel sounds present, no pain or masses on palpation  NERVOUS SYSTEM:  Alert & Oriented X3  : voiding well  EXTREMITIES:  2+ Peripheral Pulses, No clubbing, cyanosis, or edema  SKIN: warm dry                          11.3   12.81 )-----------( 193      ( 16 Nov 2022 06:46 )             34.5     11-15    138  |  106  |  18  ----------------------------<  216<H>  3.8   |  23  |  0.77    Ca    8.9      15 Nov 2022 06:30  Phos  1.4     11-15  Mg     2.2     11-15                I&O's Summary          CAPILLARY BLOOD GLUCOSE      RADIOLOGY & ADDITIONAL TESTS:                   PGY-1 Progress Note discussed with attending      PLEASE CONTACT ON CALL TEAM:  - On Call Team (Please refer to Nelly) FROM 5:00 PM - 8:30PM  - Nightfloat Team FROM 8:30 -7:30 AM    INTERVAL HPI/OVERNIGHT EVENTS: Overnight patient was complaing of chest congestion/tightness, an EKG was preforemed and did not show ST/ T wave changes.  Trops were drawn and were negative.  Patient examined at bedside with her daughter present.  Patient states her chest tightness/congesstion has resolved however she now has mild abdominal pain      REVIEW OF SYSTEMS:  CONSTITUTIONAL: No fever, weight loss, or fatigue  RESPIRATORY: No cough, wheezing, chills or hemoptysis; No shortness of breath  CARDIOVASCULAR: No chest pain, palpitations, dizziness, or leg swelling  GASTROINTESTINAL: No abdominal pain. No nausea, vomiting, or hematemesis; No diarrhea or constipation. No melena or hematochezia.  GENITOURINARY: No dysuria or hematuria, urinary frequency  NEUROLOGICAL: No headaches, memory loss, loss of strength, numbness, or tremors  SKIN: No itching, burning, rashes, or lesions     MEDICATIONS  (STANDING):  atorvastatin 40 milliGRAM(s) Oral at bedtime  benzonatate 100 milliGRAM(s) Oral three times a day  enoxaparin Injectable 60 milliGRAM(s) SubCutaneous every 12 hours  insulin lispro (ADMELOG) corrective regimen sliding scale   SubCutaneous three times a day before meals  insulin lispro (ADMELOG) corrective regimen sliding scale   SubCutaneous at bedtime  metoprolol tartrate 12.5 milliGRAM(s) Oral every 12 hours  oseltamivir 75 milliGRAM(s) Oral two times a day  pantoprazole    Tablet 40 milliGRAM(s) Oral before breakfast  predniSONE   Tablet 40 milliGRAM(s) Oral daily  sodium chloride 0.9%. 1000 milliLiter(s) (75 mL/Hr) IV Continuous <Continuous>    MEDICATIONS  (PRN):  acetaminophen     Tablet .. 650 milliGRAM(s) Oral every 6 hours PRN Temp greater or equal to 38C (100.4F), Mild Pain (1 - 3)  albuterol    90 MICROgram(s) HFA Inhaler 2 Puff(s) Inhalation every 6 hours PRN Shortness of Breath and/or Wheezing  guaiFENesin Oral Liquid (Sugar-Free) 200 milliGRAM(s) Oral every 6 hours PRN Cough  sodium chloride 0.65% Nasal 1 Spray(s) Both Nostrils two times a day PRN Nasal Congestion      Vital Signs Last 24 Hrs  T(C): 37.4 (16 Nov 2022 05:45), Max: 37.8 (15 Nov 2022 13:58)  T(F): 99.3 (16 Nov 2022 05:45), Max: 100 (15 Nov 2022 13:58)  HR: 86 (16 Nov 2022 05:45) (77 - 86)  BP: 149/88 (16 Nov 2022 05:45) (149/88 - 180/98)  BP(mean): --  RR: 18 (16 Nov 2022 05:45) (16 - 18)  SpO2: 96% (16 Nov 2022 05:45) (95% - 97%)    Parameters below as of 16 Nov 2022 05:45  Patient On (Oxygen Delivery Method): room air        PHYSICAL EXAMINATION:  GENERAL: NAD, well built  HEAD:  Atraumatic, Normocephalic  EYES:  conjunctiva and sclera clear  NECK: Supple, No JVD, Normal thyroid  CHEST/LUNG: Clear to auscultation. Clear to percussion bilaterally; No rales, rhonchi, wheezing, or rubs  HEART: Regular rate and rhythm; No murmurs, rubs, or gallops  ABDOMEN: Soft, Nontender, Nondistended; Bowel sounds present, no pain or masses on palpation  NERVOUS SYSTEM:  Alert & Oriented X3  : voiding well  EXTREMITIES:  2+ Peripheral Pulses, No clubbing, cyanosis, or edema  SKIN: warm dry                          11.3   12.81 )-----------( 193      ( 16 Nov 2022 06:46 )             34.5     11-15    138  |  106  |  18  ----------------------------<  216<H>  3.8   |  23  |  0.77    Ca    8.9      15 Nov 2022 06:30  Phos  1.4     11-15  Mg     2.2     11-15                I&O's Summary          CAPILLARY BLOOD GLUCOSE      RADIOLOGY & ADDITIONAL TESTS:                   PGY-1 Progress Note discussed with attending      PLEASE CONTACT ON CALL TEAM:  - On Call Team (Please refer to Nelly) FROM 5:00 PM - 8:30PM  - Nightfloat Team FROM 8:30 -7:30 AM    INTERVAL HPI/OVERNIGHT EVENTS: Overnight patient was complaing of chest congestion/tightness, an EKG was preforemed and did not show ST/ T wave changes.  Trops were drawn and were negative.  Patient examined at bedside with her daughter present.  Patient states her chest tightness/congesstion has resolved however she now has mild abdominal pain.  Patient states she is not short of breath.       REVIEW OF SYSTEMS:  CONSTITUTIONAL: No fever, weight loss, or fatigue  RESPIRATORY: No cough, wheezing, chills or hemoptysis; No shortness of breath  CARDIOVASCULAR: No chest pain, palpitations, dizziness, or leg swelling  GASTROINTESTINAL: POSITIVE abdominal pain. No nausea, vomiting, or hematemesis; No diarrhea or constipation. No melena or hematochezia.  GENITOURINARY: No dysuria or hematuria, urinary frequency  NEUROLOGICAL: No headaches, memory loss, loss of strength, numbness, or tremors  SKIN: No itching, burning, rashes, or lesions     MEDICATIONS  (STANDING):  atorvastatin 40 milliGRAM(s) Oral at bedtime  benzonatate 100 milliGRAM(s) Oral three times a day  enoxaparin Injectable 60 milliGRAM(s) SubCutaneous every 12 hours  insulin lispro (ADMELOG) corrective regimen sliding scale   SubCutaneous three times a day before meals  insulin lispro (ADMELOG) corrective regimen sliding scale   SubCutaneous at bedtime  metoprolol tartrate 12.5 milliGRAM(s) Oral every 12 hours  oseltamivir 75 milliGRAM(s) Oral two times a day  pantoprazole    Tablet 40 milliGRAM(s) Oral before breakfast  predniSONE   Tablet 40 milliGRAM(s) Oral daily  sodium chloride 0.9%. 1000 milliLiter(s) (75 mL/Hr) IV Continuous <Continuous>    MEDICATIONS  (PRN):  acetaminophen     Tablet .. 650 milliGRAM(s) Oral every 6 hours PRN Temp greater or equal to 38C (100.4F), Mild Pain (1 - 3)  albuterol    90 MICROgram(s) HFA Inhaler 2 Puff(s) Inhalation every 6 hours PRN Shortness of Breath and/or Wheezing  guaiFENesin Oral Liquid (Sugar-Free) 200 milliGRAM(s) Oral every 6 hours PRN Cough  sodium chloride 0.65% Nasal 1 Spray(s) Both Nostrils two times a day PRN Nasal Congestion      Vital Signs Last 24 Hrs  T(C): 37.4 (16 Nov 2022 05:45), Max: 37.8 (15 Nov 2022 13:58)  T(F): 99.3 (16 Nov 2022 05:45), Max: 100 (15 Nov 2022 13:58)  HR: 86 (16 Nov 2022 05:45) (77 - 86)  BP: 149/88 (16 Nov 2022 05:45) (149/88 - 180/98)  BP(mean): --  RR: 18 (16 Nov 2022 05:45) (16 - 18)  SpO2: 96% (16 Nov 2022 05:45) (95% - 97%)    Parameters below as of 16 Nov 2022 05:45  Patient On (Oxygen Delivery Method): room air        PHYSICAL EXAMINATION:  GENERAL: NAD, Lying in bed  HEAD:  Atraumatic, Normocephalic  EYES:  conjunctiva and sclera clear  NECK: Supple, No JVD, Normal thyroid  CHEST/LUNG: Improved b/l wheezing; Clear to auscultation. Clear to percussion bilaterally; No rales, rhonchi, or rubs  HEART: Regular rate and rhythm; No murmurs, rubs, or gallops  ABDOMEN: Soft, Nontender, Nondistended; Bowel sounds present, no pain or masses on palpation  NERVOUS SYSTEM:  Alert & Oriented X3  : voiding well  EXTREMITIES:  2+ Peripheral Pulses, No clubbing, cyanosis, or edema  SKIN: warm dry                          11.3   12.81 )-----------( 193      ( 16 Nov 2022 06:46 )             34.5     11-15    138  |  106  |  18  ----------------------------<  216<H>  3.8   |  23  |  0.77    Ca    8.9      15 Nov 2022 06:30  Phos  1.4     11-15  Mg     2.2     11-15                I&O's Summary          CAPILLARY BLOOD GLUCOSE      RADIOLOGY & ADDITIONAL TESTS:

## 2022-11-17 ENCOUNTER — TRANSCRIPTION ENCOUNTER (OUTPATIENT)
Age: 87
End: 2022-11-17

## 2022-11-17 VITALS
HEART RATE: 90 BPM | TEMPERATURE: 98 F | SYSTOLIC BLOOD PRESSURE: 135 MMHG | RESPIRATION RATE: 18 BRPM | DIASTOLIC BLOOD PRESSURE: 82 MMHG | OXYGEN SATURATION: 97 %

## 2022-11-17 LAB
ANION GAP SERPL CALC-SCNC: 7 MMOL/L — SIGNIFICANT CHANGE UP (ref 5–17)
BUN SERPL-MCNC: 16 MG/DL — SIGNIFICANT CHANGE UP (ref 7–18)
CALCIUM SERPL-MCNC: 9.6 MG/DL — SIGNIFICANT CHANGE UP (ref 8.4–10.5)
CHLORIDE SERPL-SCNC: 105 MMOL/L — SIGNIFICANT CHANGE UP (ref 96–108)
CO2 SERPL-SCNC: 26 MMOL/L — SIGNIFICANT CHANGE UP (ref 22–31)
CREAT SERPL-MCNC: 0.72 MG/DL — SIGNIFICANT CHANGE UP (ref 0.5–1.3)
EGFR: 81 ML/MIN/1.73M2 — SIGNIFICANT CHANGE UP
GLUCOSE BLDC GLUCOMTR-MCNC: 202 MG/DL — HIGH (ref 70–99)
GLUCOSE BLDC GLUCOMTR-MCNC: 203 MG/DL — HIGH (ref 70–99)
GLUCOSE BLDC GLUCOMTR-MCNC: 286 MG/DL — HIGH (ref 70–99)
GLUCOSE BLDC GLUCOMTR-MCNC: 292 MG/DL — HIGH (ref 70–99)
GLUCOSE SERPL-MCNC: 187 MG/DL — HIGH (ref 70–99)
HCT VFR BLD CALC: 33.3 % — LOW (ref 34.5–45)
HGB BLD-MCNC: 10.9 G/DL — LOW (ref 11.5–15.5)
MAGNESIUM SERPL-MCNC: 1.9 MG/DL — SIGNIFICANT CHANGE UP (ref 1.6–2.6)
MCHC RBC-ENTMCNC: 26.6 PG — LOW (ref 27–34)
MCHC RBC-ENTMCNC: 32.7 GM/DL — SIGNIFICANT CHANGE UP (ref 32–36)
MCV RBC AUTO: 81.2 FL — SIGNIFICANT CHANGE UP (ref 80–100)
NRBC # BLD: 0 /100 WBCS — SIGNIFICANT CHANGE UP (ref 0–0)
PHOSPHATE SERPL-MCNC: 2.1 MG/DL — LOW (ref 2.5–4.5)
PLATELET # BLD AUTO: 182 K/UL — SIGNIFICANT CHANGE UP (ref 150–400)
POTASSIUM SERPL-MCNC: 4.2 MMOL/L — SIGNIFICANT CHANGE UP (ref 3.5–5.3)
POTASSIUM SERPL-SCNC: 4.2 MMOL/L — SIGNIFICANT CHANGE UP (ref 3.5–5.3)
RBC # BLD: 4.1 M/UL — SIGNIFICANT CHANGE UP (ref 3.8–5.2)
RBC # FLD: 14.6 % — HIGH (ref 10.3–14.5)
SODIUM SERPL-SCNC: 138 MMOL/L — SIGNIFICANT CHANGE UP (ref 135–145)
WBC # BLD: 7.55 K/UL — SIGNIFICANT CHANGE UP (ref 3.8–10.5)
WBC # FLD AUTO: 7.55 K/UL — SIGNIFICANT CHANGE UP (ref 3.8–10.5)

## 2022-11-17 PROCEDURE — 85610 PROTHROMBIN TIME: CPT

## 2022-11-17 PROCEDURE — 83735 ASSAY OF MAGNESIUM: CPT

## 2022-11-17 PROCEDURE — 84439 ASSAY OF FREE THYROXINE: CPT

## 2022-11-17 PROCEDURE — 87637 SARSCOV2&INF A&B&RSV AMP PRB: CPT

## 2022-11-17 PROCEDURE — 84100 ASSAY OF PHOSPHORUS: CPT

## 2022-11-17 PROCEDURE — 83036 HEMOGLOBIN GLYCOSYLATED A1C: CPT

## 2022-11-17 PROCEDURE — 99285 EMERGENCY DEPT VISIT HI MDM: CPT | Mod: 25

## 2022-11-17 PROCEDURE — 84484 ASSAY OF TROPONIN QUANT: CPT

## 2022-11-17 PROCEDURE — 84443 ASSAY THYROID STIM HORMONE: CPT

## 2022-11-17 PROCEDURE — 83880 ASSAY OF NATRIURETIC PEPTIDE: CPT

## 2022-11-17 PROCEDURE — 78452 HT MUSCLE IMAGE SPECT MULT: CPT

## 2022-11-17 PROCEDURE — A9502: CPT

## 2022-11-17 PROCEDURE — 71045 X-RAY EXAM CHEST 1 VIEW: CPT

## 2022-11-17 PROCEDURE — 83605 ASSAY OF LACTIC ACID: CPT

## 2022-11-17 PROCEDURE — 36000 PLACE NEEDLE IN VEIN: CPT

## 2022-11-17 PROCEDURE — 84480 ASSAY TRIIODOTHYRONINE (T3): CPT

## 2022-11-17 PROCEDURE — 93005 ELECTROCARDIOGRAM TRACING: CPT

## 2022-11-17 PROCEDURE — 80048 BASIC METABOLIC PNL TOTAL CA: CPT

## 2022-11-17 PROCEDURE — 93017 CV STRESS TEST TRACING ONLY: CPT

## 2022-11-17 PROCEDURE — 85027 COMPLETE CBC AUTOMATED: CPT

## 2022-11-17 PROCEDURE — 36415 COLL VENOUS BLD VENIPUNCTURE: CPT

## 2022-11-17 PROCEDURE — 82962 GLUCOSE BLOOD TEST: CPT

## 2022-11-17 PROCEDURE — 70450 CT HEAD/BRAIN W/O DYE: CPT

## 2022-11-17 PROCEDURE — 94640 AIRWAY INHALATION TREATMENT: CPT

## 2022-11-17 PROCEDURE — 85025 COMPLETE CBC W/AUTO DIFF WBC: CPT

## 2022-11-17 PROCEDURE — 80053 COMPREHEN METABOLIC PANEL: CPT

## 2022-11-17 PROCEDURE — 93306 TTE W/DOPPLER COMPLETE: CPT

## 2022-11-17 PROCEDURE — 85730 THROMBOPLASTIN TIME PARTIAL: CPT

## 2022-11-17 RX ORDER — RIVAROXABAN 15 MG-20MG
1 KIT ORAL
Qty: 0 | Refills: 0 | DISCHARGE

## 2022-11-17 RX ORDER — POTASSIUM PHOSPHATE, MONOBASIC POTASSIUM PHOSPHATE, DIBASIC 236; 224 MG/ML; MG/ML
30 INJECTION, SOLUTION INTRAVENOUS ONCE
Refills: 0 | Status: COMPLETED | OUTPATIENT
Start: 2022-11-17 | End: 2022-11-17

## 2022-11-17 RX ORDER — ONDANSETRON 8 MG/1
4 TABLET, FILM COATED ORAL ONCE
Refills: 0 | Status: DISCONTINUED | OUTPATIENT
Start: 2022-11-17 | End: 2022-11-17

## 2022-11-17 RX ORDER — METHIMAZOLE 10 MG/1
1 TABLET ORAL
Qty: 30 | Refills: 0
Start: 2022-11-17 | End: 2022-12-16

## 2022-11-17 RX ADMIN — PANTOPRAZOLE SODIUM 40 MILLIGRAM(S): 20 TABLET, DELAYED RELEASE ORAL at 06:00

## 2022-11-17 RX ADMIN — Medication 2: at 08:34

## 2022-11-17 RX ADMIN — METFORMIN HYDROCHLORIDE 1000 MILLIGRAM(S): 850 TABLET ORAL at 06:01

## 2022-11-17 RX ADMIN — Medication 3: at 12:20

## 2022-11-17 RX ADMIN — Medication 2: at 17:02

## 2022-11-17 RX ADMIN — APIXABAN 2.5 MILLIGRAM(S): 2.5 TABLET, FILM COATED ORAL at 06:01

## 2022-11-17 RX ADMIN — Medication 30 MILLIGRAM(S): at 06:01

## 2022-11-17 RX ADMIN — Medication 100 MILLIGRAM(S): at 15:07

## 2022-11-17 RX ADMIN — Medication 100 MILLIGRAM(S): at 06:00

## 2022-11-17 RX ADMIN — POTASSIUM PHOSPHATE, MONOBASIC POTASSIUM PHOSPHATE, DIBASIC 83.33 MILLIMOLE(S): 236; 224 INJECTION, SOLUTION INTRAVENOUS at 10:51

## 2022-11-17 RX ADMIN — Medication 12.5 MILLIGRAM(S): at 06:01

## 2022-11-17 NOTE — PROGRESS NOTE ADULT - PROBLEM SELECTOR PLAN 1
CT head noted  Carotid doppler  Neuro eval  cardio follow-up  stress test noted  Tele monitoring. You can access the FollowMyHealth Patient Portal offered by [facility] by registering at the following website: [email or web address]. By joining [employer]’s FollowMyHealth portal, you will also be able to view your health information using other applications (apps) compatible with our system.

## 2022-11-17 NOTE — PROGRESS NOTE ADULT - PROBLEM SELECTOR PROBLEM 7
Diabetes
Hypertension
Diabetes
Hypertension
Diabetes
Diabetes
Hypertension

## 2022-11-17 NOTE — PROGRESS NOTE ADULT - PROVIDER SPECIALTY LIST ADULT
Cardiology
Cardiology
Endocrinology
Internal Medicine
Internal Medicine
Endocrinology
Internal Medicine
Internal Medicine
Cardiology
Endocrinology
Internal Medicine
Internal Medicine
Cardiology
Cardiology
Pulmonology
Internal Medicine
Pulmonology
Pulmonology
Internal Medicine
Pulmonology
Pulmonology
Internal Medicine
Internal Medicine

## 2022-11-17 NOTE — PROGRESS NOTE ADULT - SUBJECTIVE AND OBJECTIVE BOX
Patient is a 87y old  Female who presents with a chief complaint of Syncope; new onset Afib (17 Nov 2022 10:34)    Patient is awake, alert, comfortable, laying in bed, not in acute distress, doing well on RA.     INTERVAL HPI/OVERNIGHT EVENTS:      VITAL SIGNS:  T(F): 98 (11-17-22 @ 10:00)  HR: 85 (11-17-22 @ 10:00)  BP: 144/74 (11-17-22 @ 10:00)  RR: 18 (11-17-22 @ 10:00)  SpO2: 97% (11-17-22 @ 10:00)  Wt(kg): --  I&O's Detail          REVIEW OF SYSTEMS:    CONSTITUTIONAL:  No fevers, chills, sweats    HEENT:  Eyes:  No diplopia or blurred vision. ENT:  No earache, sore throat or runny nose.    CARDIOVASCULAR:  No pressure, squeezing, tightness, or heaviness about the chest; no palpitations.    RESPIRATORY:  Per HPI    GASTROINTESTINAL:  No abdominal pain, nausea, vomiting or diarrhea.    GENITOURINARY:  No dysuria, frequency or urgency.    NEUROLOGIC:  No paresthesias, fasciculations, seizures or weakness.    PSYCHIATRIC:  No disorder of thought or mood.      PHYSICAL EXAM:    Constitutional: Well developed and nourished  Eyes:Perrla  ENMT: normal  Neck:supple  Respiratory: good air entry  Cardiovascular: S1 S2 regular  Gastrointestinal: Soft, Non tender  Extremities: No edema  Vascular:normal  Neurological:Awake, alert,Ox3  Musculoskeletal:Normal      MEDICATIONS  (STANDING):  apixaban 2.5 milliGRAM(s) Oral every 12 hours  atorvastatin 40 milliGRAM(s) Oral at bedtime  benzonatate 100 milliGRAM(s) Oral three times a day  insulin lispro (ADMELOG) corrective regimen sliding scale   SubCutaneous three times a day before meals  metFORMIN 1000 milliGRAM(s) Oral two times a day  methimazole 10 milliGRAM(s) Oral daily  metoprolol tartrate 12.5 milliGRAM(s) Oral every 12 hours  ondansetron Injectable 4 milliGRAM(s) IV Push once  pantoprazole    Tablet 40 milliGRAM(s) Oral before breakfast  predniSONE   Tablet 20 milliGRAM(s) Oral daily  sodium chloride 0.9%. 1000 milliLiter(s) (75 mL/Hr) IV Continuous <Continuous>    MEDICATIONS  (PRN):  acetaminophen     Tablet .. 650 milliGRAM(s) Oral every 6 hours PRN Temp greater or equal to 38C (100.4F), Mild Pain (1 - 3)  albuterol    90 MICROgram(s) HFA Inhaler 2 Puff(s) Inhalation every 6 hours PRN Shortness of Breath and/or Wheezing  guaiFENesin Oral Liquid (Sugar-Free) 200 milliGRAM(s) Oral every 6 hours PRN Cough  sodium chloride 0.65% Nasal 1 Spray(s) Both Nostrils two times a day PRN Nasal Congestion      Allergies    No Known Allergies    Intolerances        LABS:                        10.9   7.55  )-----------( 182      ( 17 Nov 2022 05:20 )             33.3     11-17    138  |  105  |  16  ----------------------------<  187<H>  4.2   |  26  |  0.72    Ca    9.6      17 Nov 2022 05:20  Phos  2.1     11-17  Mg     1.9     11-17                CAPILLARY BLOOD GLUCOSE      POCT Blood Glucose.: 292 mg/dL (17 Nov 2022 11:09)  POCT Blood Glucose.: 203 mg/dL (17 Nov 2022 07:56)  POCT Blood Glucose.: 164 mg/dL (16 Nov 2022 22:22)  POCT Blood Glucose.: 329 mg/dL (16 Nov 2022 17:01)  POCT Blood Glucose.: 318 mg/dL (16 Nov 2022 14:11)    pro-bnp 352 11-11 @ 15:50     d-dimer --  11-11 @ 15:50      RADIOLOGY & ADDITIONAL TESTS:    CXR:    Ct scan chest:    ekg;    echo:

## 2022-11-17 NOTE — PROGRESS NOTE ADULT - PROBLEM SELECTOR PROBLEM 8
Hyperlipidemia
Abnormal thyroid blood test
Hyperlipidemia

## 2022-11-17 NOTE — PROGRESS NOTE ADULT - SUBJECTIVE AND OBJECTIVE BOX
Patient is a 87y old  Female who presents with a chief complaint of Syncope; new onset Afib (16 Nov 2022 11:17)    pt seen in tele [ x ], reg med floor [  ], bed [x  ], chair at bedside [   ], a+o x3 [ x ], lethargic [  ],    nad [ x ]        Allergies    No Known Allergies        Vitals    T(F): 98.1 (11-17-22 @ 05:14), Max: 98.7 (11-16-22 @ 13:46)  HR: 96 (11-17-22 @ 05:14) (62 - 96)  BP: 149/90 (11-17-22 @ 05:14) (147/74 - 154/72)  RR: 18 (11-17-22 @ 05:14) (16 - 18)  SpO2: 97% (11-17-22 @ 05:14) (95% - 97%)  Wt(kg): --  CAPILLARY BLOOD GLUCOSE      POCT Blood Glucose.: 164 mg/dL (16 Nov 2022 22:22)      Labs                          10.9   7.55  )-----------( 182      ( 17 Nov 2022 05:20 )             33.3       11-17    138  |  105  |  16  ----------------------------<  187<H>  4.2   |  26  |  0.72    Ca    9.6      17 Nov 2022 05:20  Phos  2.1     11-17  Mg     1.9     11-17            Troponin I, High Sensitivity Result: 17.8 ng/L (11-16-22 @ 06:46)        Radiology Results      Meds    MEDICATIONS  (STANDING):  apixaban 2.5 milliGRAM(s) Oral every 12 hours  atorvastatin 40 milliGRAM(s) Oral at bedtime  benzonatate 100 milliGRAM(s) Oral three times a day  insulin lispro (ADMELOG) corrective regimen sliding scale   SubCutaneous three times a day before meals  metFORMIN 1000 milliGRAM(s) Oral two times a day  methimazole 10 milliGRAM(s) Oral daily  metoprolol tartrate 12.5 milliGRAM(s) Oral every 12 hours  ondansetron Injectable 4 milliGRAM(s) IV Push once  pantoprazole    Tablet 40 milliGRAM(s) Oral before breakfast  predniSONE   Tablet 30 milliGRAM(s) Oral daily  sodium chloride 0.9%. 1000 milliLiter(s) (75 mL/Hr) IV Continuous <Continuous>      MEDICATIONS  (PRN):  acetaminophen     Tablet .. 650 milliGRAM(s) Oral every 6 hours PRN Temp greater or equal to 38C (100.4F), Mild Pain (1 - 3)  albuterol    90 MICROgram(s) HFA Inhaler 2 Puff(s) Inhalation every 6 hours PRN Shortness of Breath and/or Wheezing  guaiFENesin Oral Liquid (Sugar-Free) 200 milliGRAM(s) Oral every 6 hours PRN Cough  sodium chloride 0.65% Nasal 1 Spray(s) Both Nostrils two times a day PRN Nasal Congestion      Physical Exam    Neuro :  no focal deficits  Respiratory: cta B/L    CV: RRR, S1S2, no murmurs,   Abdominal: Soft, NT, ND +BS,  Extremities: No edema, + peripheral pulses      ASSESSMENT    syncope likely cardiogenic   new afib,   flu a,   hyponatremia,  hypophosphatemia  hypomagnesemia   hyperthyroidism  h/o HTN,   HLD,  DM,  CAD (coronary artery disease)      PLAN     cont tele,   aspirin, statin,   ct head with No evidence of acute intracranial hemorrhage, midline shift or CT evidence of acute territorial infarct noted    trop x2 neg noted     cardio f/u `  echo with  EF = 55 to 60%. RV systolic pressure is mildly increased noted    stress test neg noted    cont lopressor,  change lovenox to eliquis 2.5mg bid  pt to complete tamiflu today11/16/22,   pulm f/u   cont prednisone 40 daily and taper by 10 mg daily  robitussin dm prn cough   albuterol prn  pft outpt  sup phos   hgba1c 7.6 noted     now hyperglycemic due to prednisone  cont lantus 10 units  change to orals  dm meds upon d/c   resume metformin 100mg bid  tsh low and ft4 slightly elevated noted above  endo f/u  cont tapazole 10mg qd  rept tsh  cont current meds   d/c planing for am if stable              Patient is a 87y old  Female who presents with a chief complaint of Syncope; new onset Afib (16 Nov 2022 11:17)    pt seen in tele [ x ], reg med floor [  ], bed [x  ], chair at bedside [   ], a+o x3 [ x ], lethargic [  ],    nad [ x ]        Allergies    No Known Allergies        Vitals    T(F): 98.1 (11-17-22 @ 05:14), Max: 98.7 (11-16-22 @ 13:46)  HR: 96 (11-17-22 @ 05:14) (62 - 96)  BP: 149/90 (11-17-22 @ 05:14) (147/74 - 154/72)  RR: 18 (11-17-22 @ 05:14) (16 - 18)  SpO2: 97% (11-17-22 @ 05:14) (95% - 97%)  Wt(kg): --  CAPILLARY BLOOD GLUCOSE      POCT Blood Glucose.: 164 mg/dL (16 Nov 2022 22:22)      Labs                          10.9   7.55  )-----------( 182      ( 17 Nov 2022 05:20 )             33.3       11-17    138  |  105  |  16  ----------------------------<  187<H>  4.2   |  26  |  0.72    Ca    9.6      17 Nov 2022 05:20  Phos  2.1     11-17  Mg     1.9     11-17            Troponin I, High Sensitivity Result: 17.8 ng/L (11-16-22 @ 06:46)        Radiology Results      Meds    MEDICATIONS  (STANDING):  apixaban 2.5 milliGRAM(s) Oral every 12 hours  atorvastatin 40 milliGRAM(s) Oral at bedtime  benzonatate 100 milliGRAM(s) Oral three times a day  insulin lispro (ADMELOG) corrective regimen sliding scale   SubCutaneous three times a day before meals  metFORMIN 1000 milliGRAM(s) Oral two times a day  methimazole 10 milliGRAM(s) Oral daily  metoprolol tartrate 12.5 milliGRAM(s) Oral every 12 hours  ondansetron Injectable 4 milliGRAM(s) IV Push once  pantoprazole    Tablet 40 milliGRAM(s) Oral before breakfast  predniSONE   Tablet 30 milliGRAM(s) Oral daily  sodium chloride 0.9%. 1000 milliLiter(s) (75 mL/Hr) IV Continuous <Continuous>      MEDICATIONS  (PRN):  acetaminophen     Tablet .. 650 milliGRAM(s) Oral every 6 hours PRN Temp greater or equal to 38C (100.4F), Mild Pain (1 - 3)  albuterol    90 MICROgram(s) HFA Inhaler 2 Puff(s) Inhalation every 6 hours PRN Shortness of Breath and/or Wheezing  guaiFENesin Oral Liquid (Sugar-Free) 200 milliGRAM(s) Oral every 6 hours PRN Cough  sodium chloride 0.65% Nasal 1 Spray(s) Both Nostrils two times a day PRN Nasal Congestion      Physical Exam    Neuro :  no focal deficits  Respiratory: cta B/L    CV: RRR, S1S2, no murmurs,   Abdominal: Soft, NT, ND +BS,  Extremities: No edema, + peripheral pulses      ASSESSMENT    syncope likely cardiogenic   new afib,   flu a,   hyponatremia,  hypophosphatemia  hypomagnesemia   hyperthyroidism  h/o HTN,   HLD,  DM,  CAD (coronary artery disease)      PLAN     cont tele,   aspirin, statin,   ct head with No evidence of acute intracranial hemorrhage, midline shift or CT evidence of acute territorial infarct noted    trop x2 neg noted     cardio f/u `  echo with  EF = 55 to 60%. RV systolic pressure is mildly increased noted    stress test neg noted    cont lopressor,  cont eliquis 2.5mg bid  pt completed tamiflu 11/16/22,   pulm f/u   cont prednisone taper by 10 mg daily  robitussin dm prn cough   albuterol prn  pft outpt  sup phos   hgba1c 7.6 noted     now hyperglycemic due to prednisone  cont lantus 10 units  change to orals  dm meds upon d/c   resume metformin 100mg bid  tsh low and ft4 slightly elevated noted above  endo f/u  cont tapazole 10mg qd  rept tsh  cont current meds   pt for d/c after phos supplement

## 2022-11-17 NOTE — DISCHARGE NOTE NURSING/CASE MANAGEMENT/SOCIAL WORK - PATIENT PORTAL LINK FT
You can access the FollowMyHealth Patient Portal offered by [facility] by registering at the following website: [email or web address]. By joining [employer]’s FollowMyHealth portal, you will also be able to view your health information using other applications (apps) compatible with our system.

## 2022-11-17 NOTE — DISCHARGE NOTE NURSING/CASE MANAGEMENT/SOCIAL WORK - NSDCPEFALRISK_GEN_ALL_CORE
For information on Fall & Injury Prevention, visit: [email or web address] OR  [email or web address] OR  https://www.cdc.gov/steadi/patient.html

## 2022-11-17 NOTE — PROGRESS NOTE ADULT - SUBJECTIVE AND OBJECTIVE BOX
CHIEF COMPLAINT:Patient is a 87y old  Female who presents with a chief complaint of Syncope; new onset Afib.Pt appears comfortable.    	  REVIEW OF SYSTEMS:  CONSTITUTIONAL: No fever, weight loss, or fatigue  EYES: No eye pain, visual disturbances, or discharge  ENT:  No difficulty hearing, tinnitus, vertigo; No sinus or throat pain  NECK: No pain or stiffness  RESPIRATORY: No cough, wheezing, chills or hemoptysis; No Shortness of Breath  CARDIOVASCULAR: No chest pain, palpitations, passing out, dizziness, or leg swelling  GASTROINTESTINAL: No abdominal or epigastric pain. No nausea, vomiting, or hematemesis; No diarrhea or constipation. No melena or hematochezia.  GENITOURINARY: No dysuria, frequency, hematuria, or incontinence  NEUROLOGICAL: No headaches, memory loss, loss of strength, numbness, or tremors  SKIN: No itching, burning, rashes, or lesions   LYMPH Nodes: No enlarged glands  ENDOCRINE: No heat or cold intolerance; No hair loss  MUSCULOSKELETAL: No joint pain or swelling; No muscle, back, or extremity pain  PSYCHIATRIC: No depression, anxiety, mood swings, or difficulty sleeping  HEME/LYMPH: No easy bruising, or bleeding gums  ALLERGY AND IMMUNOLOGIC: No hives or eczema	        PHYSICAL EXAM:  T(C): 36.7 (11-17-22 @ 10:00), Max: 37.1 (11-16-22 @ 13:46)  HR: 85 (11-17-22 @ 10:00) (62 - 96)  BP: 144/74 (11-17-22 @ 10:00) (144/74 - 151/87)  RR: 18 (11-17-22 @ 10:00) (16 - 18)  SpO2: 97% (11-17-22 @ 10:00) (95% - 97%)      Appearance: Normal	  HEENT:   Normal oral mucosa, PERRL, EOMI	  Lymphatic: No lymphadenopathy  Cardiovascular: Normal S1 S2, No JVD, No murmurs, No edema  Respiratory: Lungs clear to auscultation	  Psychiatry: A & O x 3, Mood & affect appropriate  Gastrointestinal:  Soft, Non-tender, + BS	  Skin: No rashes, No ecchymoses, No cyanosis	  Neurologic: Non-focal  Extremities: Normal range of motion, No clubbing, cyanosis or edema  Vascular: Peripheral pulses palpable 2+ bilaterally    MEDICATIONS  (STANDING):  apixaban 2.5 milliGRAM(s) Oral every 12 hours  atorvastatin 40 milliGRAM(s) Oral at bedtime  benzonatate 100 milliGRAM(s) Oral three times a day  insulin lispro (ADMELOG) corrective regimen sliding scale   SubCutaneous three times a day before meals  metFORMIN 1000 milliGRAM(s) Oral two times a day  methimazole 10 milliGRAM(s) Oral daily  metoprolol tartrate 12.5 milliGRAM(s) Oral every 12 hours  ondansetron Injectable 4 milliGRAM(s) IV Push once  pantoprazole    Tablet 40 milliGRAM(s) Oral before breakfast  predniSONE   Tablet 20 milliGRAM(s) Oral daily  sodium chloride 0.9%. 1000 milliLiter(s) (75 mL/Hr) IV Continuous <Continuous>    	  	  LABS:	 	      Troponin I, High Sensitivity Result: 17.8 ng/L (11-16 @ 06:46)                            10.9   7.55  )-----------( 182      ( 17 Nov 2022 05:20 )             33.3     11-17    138  |  105  |  16  ----------------------------<  187<H>  4.2   |  26  |  0.72    Ca    9.6      17 Nov 2022 05:20  Phos  2.1     11-17  Mg     1.9     11-17      proBNP: Serum Pro-Brain Natriuretic Peptide: 352 pg/mL (11-11 @ 15:50)    Lipid Profile:   HgA1c:   TSH: Thyroid Stimulating Hormone, Serum: 0.14 uU/mL (11-15 @ 06:30)  Thyroid Stimulating Hormone, Serum: 0.24 uU/mL (11-13 @ 07:00)

## 2022-11-17 NOTE — PROGRESS NOTE ADULT - REASON FOR ADMISSION
Syncope; new onset Afib

## 2022-11-17 NOTE — PROGRESS NOTE ADULT - ASSESSMENT
87 year old F with PMH of HTN, HLD, DM, recent URI presents after two episodes of loss of consciousness at home,Influenza,hyponatremia and new onset afib.  1.F/U as outpatient 2 weeks.  2.Stress test no ischemia.  3.Afib-lovenox can be changed to NOAC,b blocker.  4.Infkuenza-tamiflu.  5.DM-Insulin.  6.Lipid d/o-statin.  7.Hyponatremia-resolved.  8.PPI.    
87 year old F with PMH of HTN, HLD, DM, recent URI presents after two episodes of loss of consciousness at home,Influenza,hyponatremia and new onset afib.  1.Tele monitoring.  2.Stress test in am.  3.Afib-lovenox,b blocker.  4.Infkuenza-tamiflu.  5.DM-Insulin.  6.Lipid d/o-statin.  7.Hyponatremia-resolving.  8.PPI.    
87 year old F with PMH of HTN, HLD, DM, recent URI presents after two episodes of loss of consciousness at home,Influenza,hyponatremia and new onset afib.  1.Tele monitoring.  2.Stress test no ischemia.  3.Afib-lovenox can be changed to NOAC,b blocker.  4.Infkuenza-tamiflu.  5.DM-Insulin.  6.Lipid d/o-statin.  7.Hyponatremia-resolved.  8.PPI.    
87 year old F with PMH of HTN, HLD, DM, recent URI presents after two episodes of loss of consciousness at home,Influenza,hyponatremia and new onset afib.  1.Tele monitoring.  2.Stress test today.  3.Afib-lovenox,b blocker.  4.Infkuenza-tamiflu.  5.DM-Insulin.  6.Lipid d/o-statin.  7.Hyponatremia-resolving.  8.PPI.    
87 year old F with PMH of HTN, HLD, DM, recent URI presents after two episodes of loss of consciousness at home,Influenza,hyponatremia and new onset afib.  1.Tele monitoring.  2.Stress test no ischemia.  3.Afib-lovenox can be changed to NOAC,b blocker.  4.Infkuenza-tamiflu.  5.DM-Insulin.  6.Lipid d/o-statin.  7.Hyponatremia-resolved.  8.PPI.    
87 year old F with PMH of HTN, HLD, DM, recent URI presents after two syncopal episodes admitted for syncopal workup in the setting of A flutter and influenza A+.  
87 year old F with PMH of HTN, HLD, DM, recent URI presents after two syncopal episodes admitted for syncopal workup in the setting of A flutter and influenza A+.    Unable to perform MEDREC as pharmacy is closed - [facility] [phone]
87 year old F with PMH of HTN, HLD, DM, recent URI presents after two syncopal episodes admitted for syncopal workup in the setting of A flutter and influenza A+.    Unable to perform MEDREC as pharmacy is closed - [facility] [phone]
87 year old F with PMH of HTN, HLD, DM, recent URI presents after two syncopal episodes admitted for syncopal workup in the setting of A flutter and influenza A+.

## 2022-11-17 NOTE — PROGRESS NOTE ADULT - SUBJECTIVE AND OBJECTIVE BOX
Interval Events:      Allergies    No Known Allergies    Intolerances      Endocrine/Metabolic Medications:  atorvastatin 40 milliGRAM(s) Oral at bedtime  insulin lispro (ADMELOG) corrective regimen sliding scale   SubCutaneous three times a day before meals  metFORMIN 1000 milliGRAM(s) Oral two times a day  methimazole 10 milliGRAM(s) Oral daily  predniSONE   Tablet 20 milliGRAM(s) Oral daily      Vital Signs Last 24 Hrs  T(C): 36.7 (17 Nov 2022 10:00), Max: 37.1 (16 Nov 2022 13:46)  T(F): 98 (17 Nov 2022 10:00), Max: 98.7 (16 Nov 2022 13:46)  HR: 85 (17 Nov 2022 10:00) (62 - 96)  BP: 144/74 (17 Nov 2022 10:00) (144/74 - 151/87)  BP(mean): --  RR: 18 (17 Nov 2022 10:00) (16 - 18)  SpO2: 97% (17 Nov 2022 10:00) (95% - 97%)    Parameters below as of 17 Nov 2022 10:00  Patient On (Oxygen Delivery Method): room air          PHYSICAL EXAM  All physical exam findings normal, except those marked:  General:	Alert, active, cooperative, NAD, well hydrated  .		[] Abnormal:  Neck		Normal: supple, no cervical adenopathy, no palpable thyroid  .		[] Abnormal:  Cardiovascular	Normal: regular rate, normal S1, S2, no murmurs  .		[] Abnormal:  Respiratory	Normal: no chest wall deformity, normal respiratory pattern, CTA B/L  .		[] Abnormal:  Abdominal	Normal: soft, ND, NT, bowel sounds present, no masses, no organomegaly  .		[] Abnormal:  		Normal normal genitalia, testes descended, circumcised/uncircumcised  .		Chelsea stage:			Breast chelsea:  .		Menstrual history:  .		[] Abnormal:  Extremities	Normal: FROM x4  .		[] Abnormal:  Skin		Normal: intact and not indurated, no rash, no acanthosis nigricans  .		[] Abnormal:  Neurologic	Normal: grossly intact  .		[] Abnormal:    LABS                        10.9   7.55  )-----------( 182      ( 17 Nov 2022 05:20 )             33.3                               138    |  105    |  16                  Calcium: 9.6   / iCa: x      (11-17 @ 05:20)    ----------------------------<  187       Magnesium: 1.9                              4.2     |  26     |  0.72             Phosphorous: 2.1        CAPILLARY BLOOD GLUCOSE      POCT Blood Glucose.: 203 mg/dL (17 Nov 2022 07:56)  POCT Blood Glucose.: 164 mg/dL (16 Nov 2022 22:22)  POCT Blood Glucose.: 329 mg/dL (16 Nov 2022 17:01)  POCT Blood Glucose.: 318 mg/dL (16 Nov 2022 14:11)  POCT Blood Glucose.: 407 mg/dL (16 Nov 2022 11:30)        Assesment/plan       Interval Events:  pt in nad    Allergies    No Known Allergies    Intolerances      Endocrine/Metabolic Medications:  atorvastatin 40 milliGRAM(s) Oral at bedtime  insulin lispro (ADMELOG) corrective regimen sliding scale   SubCutaneous three times a day before meals  metFORMIN 1000 milliGRAM(s) Oral two times a day  methimazole 10 milliGRAM(s) Oral daily  predniSONE   Tablet 20 milliGRAM(s) Oral daily      Vital Signs Last 24 Hrs  T(C): 36.7 (17 Nov 2022 10:00), Max: 37.1 (16 Nov 2022 13:46)  T(F): 98 (17 Nov 2022 10:00), Max: 98.7 (16 Nov 2022 13:46)  HR: 85 (17 Nov 2022 10:00) (62 - 96)  BP: 144/74 (17 Nov 2022 10:00) (144/74 - 151/87)  BP(mean): --  RR: 18 (17 Nov 2022 10:00) (16 - 18)  SpO2: 97% (17 Nov 2022 10:00) (95% - 97%)    Parameters below as of 17 Nov 2022 10:00  Patient On (Oxygen Delivery Method): room air          PHYSICAL EXAM  All physical exam findings normal, except those marked:  General:	Alert, active, cooperative, NAD, well hydrated  .		[] Abnormal:  Neck		Normal: supple, no cervical adenopathy, no palpable thyroid  .		[] Abnormal:  Cardiovascular	Normal: regular rate, normal S1, S2, no murmurs  .		[] Abnormal:  Respiratory	Normal: no chest wall deformity, normal respiratory pattern, CTA B/L  .		[] Abnormal:  Abdominal	Normal: soft, ND, NT, bowel sounds present, no masses, no organomegaly  .		[] Abnormal:  		Normal normal genitalia, testes descended, circumcised/uncircumcised  .		Chelsea stage:			Breast chelsea:  .		Menstrual history:  .		[] Abnormal:  Extremities	Normal: FROM x4  .		[] Abnormal:  Skin		Normal: intact and not indurated, no rash, no acanthosis nigricans  .		[] Abnormal:  Neurologic	Normal: grossly intact  .		[] Abnormal:    LABS                        10.9   7.55  )-----------( 182      ( 17 Nov 2022 05:20 )             33.3                               138    |  105    |  16                  Calcium: 9.6   / iCa: x      (11-17 @ 05:20)    ----------------------------<  187       Magnesium: 1.9                              4.2     |  26     |  0.72             Phosphorous: 2.1        CAPILLARY BLOOD GLUCOSE      POCT Blood Glucose.: 203 mg/dL (17 Nov 2022 07:56)  POCT Blood Glucose.: 164 mg/dL (16 Nov 2022 22:22)  POCT Blood Glucose.: 329 mg/dL (16 Nov 2022 17:01)  POCT Blood Glucose.: 318 mg/dL (16 Nov 2022 14:11)  POCT Blood Glucose.: 407 mg/dL (16 Nov 2022 11:30)        Assesment/plan    87 year old F with PMH of HTN, HLD, DM, recent URI presents after two episodes of loss of consciousness at home.   Found to have abn tfts. Pt and family denies prev hx of thyroid dz. Denies palp/wt loss. Has hx of dm on glipizide and farxiga as out pt.       Problem/Recommendation - 1:  ·  Problem: Abnormal thyroid blood test.   ·  Recommendation: r/o hyperthyroidism- no sx but with new onset A-fib  repeat tsh- 0.14 await free t4  start tapazole 10mg qd  d/w family and prim team.  d/w grand son at the bedside     Problem/Recommendation - 2:  ·  Problem: Syncope.   ·  Recommendation: w/u per cardio   stress test today.     Problem/Recommendation - 3:  ·  Problem: Diabetes.   ·  Recommendation: decent control as out pt  on oral dm meds  a1c- 7.6  now hyperglycemic due to prednisone-tapering 20mg  fsg ac and hs  change to orals  dm meds upon d/c.     Problem/Recommendation - 4:  ·  Problem: New onset atrial fibrillation.   ·  Recommendation: ? due to hyperthyroidism- start tapazole 10mg qd  tx per cardio.

## 2022-11-20 LAB — T3 SERPL-MCNC: 85 NG/DL — SIGNIFICANT CHANGE UP

## 2023-03-30 NOTE — PROGRESS NOTE ADULT - PROVIDER SPECIALTY LIST ADULT
Per JAI Erickson, Ms. Collins has been called with recent Screening Mammogram results & recommendations.  Continue current medications and follow-up as planned or sooner if any problems.       ----- Message from JAI Mccurdy sent at 3/30/2023 12:41 PM CDT -----  Repeat mammogram yearly.  Please call or send letter.      
Internal Medicine

## 2023-10-02 NOTE — ED ADULT TRIAGE NOTE - SPO2 (%)
98 no blurred vision/no change in level of consciousness/no confusion/no dizziness/no loss of consciousness/no nausea/no numbness/no vomiting/no weakness

## 2023-11-10 ENCOUNTER — APPOINTMENT (OUTPATIENT)
Dept: UROLOGY | Facility: CLINIC | Age: 88
End: 2023-11-10

## 2023-11-24 PROBLEM — Z00.00 ENCOUNTER FOR PREVENTIVE HEALTH EXAMINATION: Status: ACTIVE | Noted: 2023-11-24

## 2023-11-27 ENCOUNTER — APPOINTMENT (OUTPATIENT)
Dept: UROLOGY | Facility: CLINIC | Age: 88
End: 2023-11-27
Payer: MEDICARE

## 2023-11-27 VITALS
DIASTOLIC BLOOD PRESSURE: 74 MMHG | HEART RATE: 66 BPM | BODY MASS INDEX: 26.1 KG/M2 | WEIGHT: 121 LBS | RESPIRATION RATE: 17 BRPM | HEIGHT: 57 IN | SYSTOLIC BLOOD PRESSURE: 152 MMHG

## 2023-11-27 DIAGNOSIS — Z86.79 PERSONAL HISTORY OF OTHER DISEASES OF THE CIRCULATORY SYSTEM: ICD-10-CM

## 2023-11-27 DIAGNOSIS — N13.30 UNSPECIFIED HYDRONEPHROSIS: ICD-10-CM

## 2023-11-27 DIAGNOSIS — I48.91 UNSPECIFIED ATRIAL FIBRILLATION: ICD-10-CM

## 2023-11-27 DIAGNOSIS — R63.4 ABNORMAL WEIGHT LOSS: ICD-10-CM

## 2023-11-27 DIAGNOSIS — Z86.39 PERSONAL HISTORY OF OTHER ENDOCRINE, NUTRITIONAL AND METABOLIC DISEASE: ICD-10-CM

## 2023-11-27 DIAGNOSIS — N13.5 CROSSING VESSEL AND STRICTURE OF URETER W/OUT HYDRONEPHROSIS: ICD-10-CM

## 2023-11-27 PROCEDURE — 99204 OFFICE O/P NEW MOD 45 MIN: CPT

## 2023-11-27 RX ORDER — FERROUS SULFATE TAB 325 MG (65 MG ELEMENTAL FE) 325 (65 FE) MG
325 (65 FE) TAB ORAL
Refills: 0 | Status: ACTIVE | COMMUNITY

## 2023-11-27 RX ORDER — MAGNESIUM OXIDE/MAG AA CHELATE 300 MG
CAPSULE ORAL
Refills: 0 | Status: ACTIVE | COMMUNITY

## 2023-11-27 RX ORDER — METFORMIN HYDROCHLORIDE 1000 MG/1
1000 TABLET, COATED ORAL
Refills: 0 | Status: ACTIVE | COMMUNITY

## 2023-11-27 RX ORDER — ESOMEPRAZOLE MAGNESIUM 40 MG/1
40 CAPSULE, DELAYED RELEASE ORAL
Refills: 0 | Status: ACTIVE | COMMUNITY

## 2023-11-27 RX ORDER — AMLODIPINE BESYLATE 10 MG/1
10 TABLET ORAL
Refills: 0 | Status: ACTIVE | COMMUNITY

## 2023-11-27 RX ORDER — METHOTREXATE 2.5 MG/1
2.5 TABLET ORAL
Refills: 0 | Status: ACTIVE | COMMUNITY

## 2023-11-27 RX ORDER — GLIPIZIDE 5 MG/1
5 TABLET ORAL
Refills: 0 | Status: ACTIVE | COMMUNITY

## 2023-11-27 RX ORDER — EZETIMIBE 10 MG/1
10 TABLET ORAL
Refills: 0 | Status: ACTIVE | COMMUNITY

## 2023-11-27 RX ORDER — DAPAGLIFLOZIN 10 MG/1
10 TABLET, FILM COATED ORAL
Refills: 0 | Status: ACTIVE | COMMUNITY

## 2023-11-27 RX ORDER — ASPIRIN 81 MG
81 TABLET, DELAYED RELEASE (ENTERIC COATED) ORAL
Refills: 0 | Status: ACTIVE | COMMUNITY

## 2023-11-27 RX ORDER — APIXABAN 2.5 MG/1
2.5 TABLET, FILM COATED ORAL
Refills: 0 | Status: ACTIVE | COMMUNITY

## 2023-11-27 RX ORDER — HYDROCHLOROTHIAZIDE 25 MG/1
25 TABLET ORAL
Refills: 0 | Status: ACTIVE | COMMUNITY

## 2023-11-27 RX ORDER — ATORVASTATIN CALCIUM 40 MG/1
40 TABLET, FILM COATED ORAL
Refills: 0 | Status: ACTIVE | COMMUNITY

## 2023-11-27 RX ORDER — FOLIC ACID 1 MG/1
1 TABLET ORAL
Refills: 0 | Status: ACTIVE | COMMUNITY

## 2023-11-29 LAB
APPEARANCE: CLEAR
BILIRUBIN URINE: NEGATIVE
BLOOD URINE: NEGATIVE
COLOR: YELLOW
GLUCOSE QUALITATIVE U: >=1000 MG/DL
KETONES URINE: NEGATIVE MG/DL
LEUKOCYTE ESTERASE URINE: NEGATIVE
NITRITE URINE: NEGATIVE
PH URINE: 6.5
PROTEIN URINE: NEGATIVE MG/DL
SPECIFIC GRAVITY URINE: 1.02
URINE CYTOLOGY: NORMAL
UROBILINOGEN URINE: 0.2 MG/DL

## 2023-12-11 ENCOUNTER — TRANSCRIPTION ENCOUNTER (OUTPATIENT)
Age: 88
End: 2023-12-11

## 2024-07-26 NOTE — PATIENT PROFILE ADULT - NSPROSPHOSPCHAPLAINYN_GEN_A_NUR
What Type Of Note Output Would You Prefer (Optional)?: Standard Output Is The Patient Presenting As Previously Scheduled?: No, they are coming in before their scheduled appointment How Severe Is Your Rash?: moderate Is This A New Presentation, Or A Follow-Up?: Rash no

## 2024-11-19 NOTE — CONSULT NOTE ADULT - ASSESSMENT
87 year old F with PMH of HTN, HLD, DM, recent URI presents after two episodes of loss of consciousness at home,Influenza,hyponatremia and new onset afib.  1.Tele monitoring.  2.Echocardiogram.  3.Afib-lovenox,b blocker.  4.Infkuenza-tamiflu.  5.DM-Insulin.  6.Lipid d/o-statin.  7.Hyponatremia-IVF.  8.PPI.  9.Check TFT's,A1C in am.  10.Replace lytes.
87 year old F with PMH of HTN, HLD, DM, recent URI presents after two episodes of loss of consciousness at home.   Found to have abn tfts. Pt and family denies prev hx of thyroid dz. Denies palp/wt loss. Has hx of dm on glipizide and farxiga as out pt.
Alert and oriented to person, place and time

## 2024-12-13 NOTE — ED ADULT NURSE NOTE - WAS YOUR LAST COVID-19 VACCINE GREATER THAN OR EQUAL TO TWO MONTHS AGO?
Pt denies SOB, chest pains, or discomfort. No signs of distress noted.
Pt is alert and responsive
Yes

## 2025-07-21 NOTE — PHYSICAL THERAPY INITIAL EVALUATION ADULT - ASSISTIVE DEVICE: SCOOT/BRIDGE, REHAB EVAL
Patient had 1 episode of diarrhea yesterday  CT abdomen/pelvis: No acute intra-abdominal abnormality.  Subtle finding of right inguinal hernia without strangulation   Diarrhea in the setting of recent URI symptoms, possibly secondary to viral illness  Influenza-COVID PCR pending, rapid antigen was negative   No additional diarrhea thus far: if persists will check stool studies  Stool studies negative.  Resolved.   bed rails